# Patient Record
Sex: MALE | Race: WHITE | NOT HISPANIC OR LATINO | Employment: OTHER | URBAN - METROPOLITAN AREA
[De-identification: names, ages, dates, MRNs, and addresses within clinical notes are randomized per-mention and may not be internally consistent; named-entity substitution may affect disease eponyms.]

---

## 2017-01-24 ENCOUNTER — TRANSCRIBE ORDERS (OUTPATIENT)
Dept: ADMINISTRATIVE | Facility: HOSPITAL | Age: 82
End: 2017-01-24

## 2017-01-24 ENCOUNTER — HOSPITAL ENCOUNTER (OUTPATIENT)
Dept: GENERAL RADIOLOGY | Facility: HOSPITAL | Age: 82
Discharge: HOME | End: 2017-01-24
Admitting: UROLOGY

## 2017-01-24 DIAGNOSIS — N31.1 REFLEX NEUROPATHIC BLADDER, NOT ELSEWHERE CLASSIFIED: Primary | ICD-10-CM

## 2017-01-24 PROCEDURE — 74000 HC ABDOMEN KUB: CPT

## 2017-04-20 ENCOUNTER — TRANSCRIBE ORDERS (OUTPATIENT)
Dept: GENERAL RADIOLOGY | Facility: HOSPITAL | Age: 82
End: 2017-04-20

## 2017-04-20 ENCOUNTER — HOSPITAL ENCOUNTER (OUTPATIENT)
Dept: GENERAL RADIOLOGY | Facility: HOSPITAL | Age: 82
Discharge: HOME | End: 2017-04-20
Admitting: UROLOGY

## 2017-04-20 DIAGNOSIS — M40.209 KYPHOSIS, UNSPECIFIED KYPHOSIS TYPE, UNSPECIFIED SPINAL REGION: Primary | ICD-10-CM

## 2017-04-20 DIAGNOSIS — M40.209 KYPHOSIS, UNSPECIFIED KYPHOSIS TYPE, UNSPECIFIED SPINAL REGION: ICD-10-CM

## 2017-04-20 PROCEDURE — 74000 HC ABDOMEN KUB: CPT

## 2017-12-11 ENCOUNTER — HOSPITAL ENCOUNTER (OUTPATIENT)
Dept: GENERAL RADIOLOGY | Facility: HOSPITAL | Age: 82
Discharge: HOME | End: 2017-12-11
Admitting: UROLOGY

## 2017-12-11 ENCOUNTER — TRANSCRIBE ORDERS (OUTPATIENT)
Dept: GENERAL RADIOLOGY | Facility: HOSPITAL | Age: 82
End: 2017-12-11

## 2017-12-11 DIAGNOSIS — N40.1 URINARY HESITANCY DUE TO BENIGN PROSTATIC HYPERPLASIA: ICD-10-CM

## 2017-12-11 DIAGNOSIS — N40.1 URINARY HESITANCY DUE TO BENIGN PROSTATIC HYPERPLASIA: Primary | ICD-10-CM

## 2017-12-11 DIAGNOSIS — R39.11 URINARY HESITANCY DUE TO BENIGN PROSTATIC HYPERPLASIA: ICD-10-CM

## 2017-12-11 DIAGNOSIS — R39.11 URINARY HESITANCY DUE TO BENIGN PROSTATIC HYPERPLASIA: Primary | ICD-10-CM

## 2017-12-11 PROCEDURE — 74000 HC ABDOMEN KUB: CPT

## 2018-06-18 ENCOUNTER — APPOINTMENT (OUTPATIENT)
Dept: GENERAL RADIOLOGY | Facility: HOSPITAL | Age: 83
End: 2018-06-18

## 2018-06-18 ENCOUNTER — HOSPITAL ENCOUNTER (EMERGENCY)
Facility: HOSPITAL | Age: 83
Discharge: HOME | End: 2018-06-18
Attending: EMERGENCY MEDICINE | Admitting: EMERGENCY MEDICINE

## 2018-06-18 VITALS
SYSTOLIC BLOOD PRESSURE: 153 MMHG | BODY MASS INDEX: 29.21 KG/M2 | DIASTOLIC BLOOD PRESSURE: 81 MMHG | OXYGEN SATURATION: 95 % | TEMPERATURE: 98.2 F | RESPIRATION RATE: 16 BRPM | WEIGHT: 197.2 LBS | HEART RATE: 90 BPM | HEIGHT: 69 IN

## 2018-06-18 DIAGNOSIS — S62.101A CLOSED FRACTURE OF RIGHT WRIST, INITIAL ENCOUNTER: Primary | ICD-10-CM

## 2018-06-18 PROCEDURE — 99283 EMERGENCY DEPT VISIT LOW MDM: CPT

## 2018-06-18 PROCEDURE — 73110 X-RAY EXAM OF WRIST: CPT

## 2018-06-18 RX ORDER — HYDROCODONE BITARTRATE AND ACETAMINOPHEN 5; 325 MG/1; MG/1
1 TABLET ORAL ONCE
Status: COMPLETED | OUTPATIENT
Start: 2018-06-18 | End: 2018-06-18

## 2018-06-18 RX ORDER — HYDROCODONE BITARTRATE AND ACETAMINOPHEN 5; 325 MG/1; MG/1
1 TABLET ORAL EVERY 6 HOURS PRN
Qty: 12 TABLET | Refills: 0 | Status: SHIPPED | OUTPATIENT
Start: 2018-06-18 | End: 2018-06-22 | Stop reason: HOSPADM

## 2018-06-18 RX ADMIN — HYDROCODONE BITARTRATE AND ACETAMINOPHEN 1 TABLET: 5; 325 TABLET ORAL at 15:52

## 2018-06-18 NOTE — ED PROVIDER NOTES
Subjective   82-year-old male presents with a wrist injury after fall.  He states he was pulling down a pulled down ladder lost his balance and fell onto the garage floor.  He landed on his outstretched right wrist.  He has pain and swelling and obvious deformity to the right wrist.  No head or neck injury no loss of consciousness        History provided by:  Patient   used: No        Review of Systems   Musculoskeletal:        Right wrist injury   All other systems reviewed and are negative.      Past Medical History:   Diagnosis Date   • Acute myocardial infarction    • BPH (benign prostatic hyperplasia)    • CAD (coronary artery disease)    • GERD without esophagitis    • [place] (hard of hearing)    • Hyperlipidemia     UNSPECIFIED   • Hypertension    • Hypothyroidism        Allergies   Allergen Reactions   • Metoprolol        Past Surgical History:   Procedure Laterality Date   • CORONARY ARTERY BYPASS GRAFT     • OTHER SURGICAL HISTORY      INGUINAL HERNIA REPAIR BILATERAL   • OTHER SURGICAL HISTORY      PREVIOUS STENT PLACEMENT       Family History   Problem Relation Age of Onset   • Coronary artery disease Mother    • Coronary artery disease Sister        Social History     Social History   • Marital status:      Social History Main Topics   • Smoking status: Former Smoker   • Alcohol use No   • Drug use: Unknown     Other Topics Concern   • Not on file           Objective   Physical Exam   Constitutional: He is oriented to person, place, and time. He appears well-developed and well-nourished.   HENT:   Head: Normocephalic and atraumatic.   Eyes: EOM are normal. Pupils are equal, round, and reactive to light.   Neck: Normal range of motion.   Cardiovascular: Normal rate and regular rhythm.    Pulmonary/Chest: Effort normal and breath sounds normal.   Abdominal: Soft. Bowel sounds are normal.   Musculoskeletal: He exhibits edema, tenderness and deformity.   Right wrist deformity, NV  intact, good pulses    Neurological: He is alert and oriented to person, place, and time.   Skin: Skin is warm and dry.   Psychiatric: He has a normal mood and affect. His behavior is normal. Judgment and thought content normal.   Nursing note and vitals reviewed.      Splint - Cast - Strapping  Date/Time: 6/18/2018 3:29 PM  Performed by: SHANIA GAINES JR  Authorized by: KWABENA AL     Consent:     Consent obtained:  Verbal    Consent given by:  Patient    Risks discussed:  Numbness and pain    Alternatives discussed:  No treatment  Pre-procedure details:     Sensation:  Normal    Skin color:  Normal  Procedure details:     Laterality:  Right    Location:  Wrist    Wrist:  R wrist    Strapping: no      Cast type:  Long arm    Splint type:  Sugar tong    Supplies:  Cotton padding and Ortho-Glass  Post-procedure details:     Pain:  Improved    Sensation:  Normal    Skin color:  Normal    Patient tolerance of procedure:  Tolerated well, no immediate complications               ED Course                  MDM      Final diagnoses:   Closed fracture of right wrist, initial encounter            Shania Gaines Jr., PA-C  06/18/18 1534

## 2018-06-19 ENCOUNTER — HOSPITAL ENCOUNTER (OUTPATIENT)
Dept: GENERAL RADIOLOGY | Facility: HOSPITAL | Age: 83
Discharge: HOME | End: 2018-06-19
Admitting: ORTHOPAEDIC SURGERY

## 2018-06-19 ENCOUNTER — OFFICE VISIT (OUTPATIENT)
Dept: ORTHOPEDIC SURGERY | Facility: CLINIC | Age: 83
End: 2018-06-19

## 2018-06-19 ENCOUNTER — APPOINTMENT (OUTPATIENT)
Dept: PREADMISSION TESTING | Facility: HOSPITAL | Age: 83
End: 2018-06-19

## 2018-06-19 ENCOUNTER — PREP FOR SURGERY (OUTPATIENT)
Dept: OTHER | Facility: HOSPITAL | Age: 83
End: 2018-06-19

## 2018-06-19 VITALS
BODY MASS INDEX: 28.14 KG/M2 | WEIGHT: 190 LBS | SYSTOLIC BLOOD PRESSURE: 156 MMHG | DIASTOLIC BLOOD PRESSURE: 81 MMHG | HEIGHT: 69 IN | HEART RATE: 75 BPM | OXYGEN SATURATION: 97 %

## 2018-06-19 VITALS — HEIGHT: 69 IN | WEIGHT: 196 LBS | BODY MASS INDEX: 29.03 KG/M2 | RESPIRATION RATE: 18 BRPM

## 2018-06-19 DIAGNOSIS — Z01.818 PREOP EXAMINATION: ICD-10-CM

## 2018-06-19 DIAGNOSIS — Z01.818 PREOP EXAMINATION: Primary | ICD-10-CM

## 2018-06-19 DIAGNOSIS — S52.531A CLOSED COLLES' FRACTURE OF RIGHT RADIUS, INITIAL ENCOUNTER: Primary | ICD-10-CM

## 2018-06-19 LAB
ALBUMIN SERPL-MCNC: 4.4 G/DL (ref 3.5–5)
ALBUMIN/GLOB SERPL: 1.2 G/DL (ref 1–2)
ALP SERPL-CCNC: 68 U/L (ref 38–126)
ALT SERPL W P-5'-P-CCNC: 28 U/L (ref 13–69)
ANION GAP SERPL CALCULATED.3IONS-SCNC: 15.1 MMOL/L (ref 10–20)
AST SERPL-CCNC: 29 U/L (ref 15–46)
BACTERIA UR QL AUTO: ABNORMAL /HPF
BASOPHILS # BLD AUTO: 0.07 10*3/MM3 (ref 0–0.2)
BASOPHILS NFR BLD AUTO: 0.6 % (ref 0–2.5)
BILIRUB SERPL-MCNC: 1 MG/DL (ref 0.2–1.3)
BILIRUB UR QL STRIP: NEGATIVE
BUN BLD-MCNC: 13 MG/DL (ref 7–20)
BUN/CREAT SERPL: 13 (ref 6.3–21.9)
CALCIUM SPEC-SCNC: 8.6 MG/DL (ref 8.4–10.2)
CHLORIDE SERPL-SCNC: 101 MMOL/L (ref 98–107)
CLARITY UR: CLEAR
CO2 SERPL-SCNC: 25 MMOL/L (ref 26–30)
COLOR UR: YELLOW
CREAT BLD-MCNC: 1 MG/DL (ref 0.6–1.3)
DEPRECATED RDW RBC AUTO: 41 FL (ref 37–54)
EOSINOPHIL # BLD AUTO: 0.21 10*3/MM3 (ref 0–0.7)
EOSINOPHIL NFR BLD AUTO: 1.9 % (ref 0–7)
ERYTHROCYTE [DISTWIDTH] IN BLOOD BY AUTOMATED COUNT: 11.8 % (ref 11.5–14.5)
GFR SERPL CREATININE-BSD FRML MDRD: 72 ML/MIN/1.73
GLOBULIN UR ELPH-MCNC: 3.7 GM/DL
GLUCOSE BLD-MCNC: 97 MG/DL (ref 74–98)
GLUCOSE UR STRIP-MCNC: NEGATIVE MG/DL
HCT VFR BLD AUTO: 46.5 % (ref 42–52)
HGB BLD-MCNC: 15.6 G/DL (ref 14–18)
HGB UR QL STRIP.AUTO: ABNORMAL
HYALINE CASTS UR QL AUTO: ABNORMAL /LPF
IMM GRANULOCYTES # BLD: 0.12 10*3/MM3 (ref 0–0.06)
IMM GRANULOCYTES NFR BLD: 1.1 % (ref 0–0.6)
KETONES UR QL STRIP: NEGATIVE
LEUKOCYTE ESTERASE UR QL STRIP.AUTO: NEGATIVE
LYMPHOCYTES # BLD AUTO: 2.91 10*3/MM3 (ref 0.6–3.4)
LYMPHOCYTES NFR BLD AUTO: 26.7 % (ref 10–50)
MCH RBC QN AUTO: 32.2 PG (ref 27–31)
MCHC RBC AUTO-ENTMCNC: 33.5 G/DL (ref 30–37)
MCV RBC AUTO: 95.9 FL (ref 80–94)
MONOCYTES # BLD AUTO: 1.13 10*3/MM3 (ref 0–0.9)
MONOCYTES NFR BLD AUTO: 10.4 % (ref 0–12)
NEUTROPHILS # BLD AUTO: 6.47 10*3/MM3 (ref 2–6.9)
NEUTROPHILS NFR BLD AUTO: 59.3 % (ref 37–80)
NITRITE UR QL STRIP: NEGATIVE
NRBC BLD MANUAL-RTO: 0 /100 WBC (ref 0–0)
PH UR STRIP.AUTO: 6.5 [PH] (ref 5–8)
PLATELET # BLD AUTO: 178 10*3/MM3 (ref 130–400)
PMV BLD AUTO: 10.1 FL (ref 6–12)
POTASSIUM BLD-SCNC: 4.1 MMOL/L (ref 3.5–5.1)
PROT SERPL-MCNC: 8.1 G/DL (ref 6.3–8.2)
PROT UR QL STRIP: NEGATIVE
RBC # BLD AUTO: 4.85 10*6/MM3 (ref 4.7–6.1)
RBC # UR: ABNORMAL /HPF
REF LAB TEST METHOD: ABNORMAL
SODIUM BLD-SCNC: 137 MMOL/L (ref 137–145)
SP GR UR STRIP: 1.02 (ref 1–1.03)
SQUAMOUS #/AREA URNS HPF: ABNORMAL /HPF
UROBILINOGEN UR QL STRIP: ABNORMAL
WBC NRBC COR # BLD: 10.91 10*3/MM3 (ref 4.8–10.8)
WBC UR QL AUTO: ABNORMAL /HPF

## 2018-06-19 PROCEDURE — 71046 X-RAY EXAM CHEST 2 VIEWS: CPT

## 2018-06-19 PROCEDURE — 80053 COMPREHEN METABOLIC PANEL: CPT | Performed by: ORTHOPAEDIC SURGERY

## 2018-06-19 PROCEDURE — 85025 COMPLETE CBC W/AUTO DIFF WBC: CPT | Performed by: ORTHOPAEDIC SURGERY

## 2018-06-19 PROCEDURE — 81001 URINALYSIS AUTO W/SCOPE: CPT | Performed by: ORTHOPAEDIC SURGERY

## 2018-06-19 PROCEDURE — 36415 COLL VENOUS BLD VENIPUNCTURE: CPT

## 2018-06-19 PROCEDURE — 87081 CULTURE SCREEN ONLY: CPT | Performed by: ORTHOPAEDIC SURGERY

## 2018-06-19 PROCEDURE — 99204 OFFICE O/P NEW MOD 45 MIN: CPT | Performed by: ORTHOPAEDIC SURGERY

## 2018-06-19 PROCEDURE — 93005 ELECTROCARDIOGRAM TRACING: CPT | Performed by: ORTHOPAEDIC SURGERY

## 2018-06-19 RX ORDER — FLUTICASONE PROPIONATE 50 MCG
1 SPRAY, SUSPENSION (ML) NASAL DAILY
Status: ON HOLD | COMMUNITY
End: 2019-11-25

## 2018-06-19 NOTE — PROGRESS NOTES
Subjective   Patient ID: Remington Rojas is a 82 y.o. male  Pain of the Right Wrist (Patient states he fell at home on 06-19-18 in the garage while trying to pull the stairs down from the attic when the rope broke and he lost balance. He says the pain comes and goes right now at a level of 5 or 6.)             History of Present Illness  Right-hand-dominant male lost his balance fell on his right side sustaining a closed fracture distal radius was splinted in ER yesterday comes in today to discuss treatment.    Has been in stable medical health underwent cardiac bypass surgery years ago and has had routine follow-up visits with no complications, denies recent cardiac symptoms, lives with his wife and is active with fishing and desires to return to full use of his right hand after this fracture is healed.    Denies head injury loss of consciousness did fall on the left side of his posterior buttock area but denies any real acute left groin pain posterior buttock pain numbness or tingling left leg.      Review of Systems   Constitutional: Negative for fever.   HENT: Negative for voice change.    Eyes: Negative for visual disturbance.   Respiratory: Negative for shortness of breath.    Cardiovascular: Negative for chest pain.   Gastrointestinal: Negative for abdominal distention and abdominal pain.   Genitourinary: Negative for dysuria.   Musculoskeletal: Positive for arthralgias. Negative for gait problem and joint swelling.   Skin: Negative for rash.   Neurological: Negative for speech difficulty.   Hematological: Does not bruise/bleed easily.   Psychiatric/Behavioral: Negative for confusion.       Past Medical History:   Diagnosis Date   • Acute myocardial infarction    • BPH (benign prostatic hyperplasia)    • CAD (coronary artery disease)    • GERD without esophagitis    • [place] (hard of hearing)    • Hyperlipidemia     UNSPECIFIED   • Hypertension    • Hypothyroidism         Past Surgical History:   Procedure  Laterality Date   • CORONARY ARTERY BYPASS GRAFT     • OTHER SURGICAL HISTORY      INGUINAL HERNIA REPAIR BILATERAL   • OTHER SURGICAL HISTORY      PREVIOUS STENT PLACEMENT       Family History   Problem Relation Age of Onset   • Coronary artery disease Mother    • Coronary artery disease Sister        Social History     Social History   • Marital status:      Spouse name: N/A   • Number of children: N/A   • Years of education: N/A     Occupational History   • Not on file.     Social History Main Topics   • Smoking status: Former Smoker   • Smokeless tobacco: Never Used   • Alcohol use No   • Drug use: No   • Sexual activity: Defer     Other Topics Concern   • Not on file     Social History Narrative   • No narrative on file       I have reviewed all of the above social hx, family hx, surgical hx, medications, allergies & ROS and confirm that it is accurate.    Allergies   Allergen Reactions   • Metoprolol          Current Outpatient Prescriptions:   •  aspirin 81 MG tablet, Take  by mouth., Disp: , Rfl:   •  famotidine (PEPCID) 20 MG tablet, Take 20 mg by mouth 2 (two) times a day., Disp: , Rfl:   •  finasteride (PROSCAR) 5 MG tablet, Take 5 mg by mouth daily., Disp: , Rfl:   •  HYDROcodone-acetaminophen (NORCO) 5-325 MG per tablet, Take 1 tablet by mouth Every 6 (Six) Hours As Needed for Mild Pain ., Disp: 12 tablet, Rfl: 0  •  HYDROcodone-acetaminophen (NORCO) 7.5-325 MG per tablet, Take  by mouth., Disp: , Rfl:   •  levothyroxine (SYNTHROID, LEVOTHROID) 25 MCG tablet, Take 25 mcg by mouth daily., Disp: , Rfl:   •  lisinopril (PRINIVIL,ZESTRIL) 2.5 MG tablet, Take  by mouth., Disp: , Rfl:   •  nitroglycerin (NITROSTAT) 0.4 MG SL tablet, Place  under the tongue., Disp: , Rfl:   •  Omega-3 Fatty Acids (FISH OIL PO), Take  by mouth., Disp: , Rfl:   •  tamsulosin (FLOMAX) 0.4 MG capsule 24 hr capsule, Take 1 capsule by mouth every night., Disp: , Rfl:   No current facility-administered medications for this  "visit.     Objective   Resp 18   Ht 175.3 cm (69\")   Wt 88.9 kg (196 lb)   BMI 28.94 kg/m²    Physical Exam   Cardiovascular: Regular rhythm and normal heart sounds.    Pulmonary/Chest: Effort normal and breath sounds normal.   Musculoskeletal:        Right wrist: He exhibits tenderness, bony tenderness and swelling.     Constitutional: Patient is oriented to person, place, and time. Patient appears well-developed and well-nourished.   HENT:Head: Normocephalic and atraumatic.   Eyes: EOM are normal. Pupils are equal, round, and reactive to light.   Neck: Normal range of motion. Neck supple.   Cardiovascular: Normal rate.    Pulmonary/Chest: Effort normal and breath sounds normal.   Abdominal: Soft.   Neurological: Patient is alert and oriented to person, place, and time.   Skin: Skin is warm and dry.   Psychiatric: Patient has a normal mood and affect.   Nursing note and vitals reviewed.       Ortho Exam   Gauntlet type splint to the right arm was removed soft padding was cut good capillary refill the fingers no open wounds tenderness swelling ecchymosis on the radial side of the wrist.    Assessment/Plan   Review of Radiographic Studies:    No new imaging done today.      Procedures        Orthopedic activities reviewed and patient expressed appreciation, Risk, benefits, and merits of treatment alternatives reviewed with the patient and questions answered and The nature of the proposed surgery reviewed with the patient including risks, benefits, rehabilitation, recovery timeframe, and outcome expectations      Recommendations/Plan:   Surgery: Surgery proposed at this visit as noted.    Patient agreeable to call or return sooner for any concerns.         I discussed with the patient the diagnosis, condition, natural history and treatment alternatives, both surgical and nonsurgical.  I reviewed the surgical procedural details using models, diagrams and reviewing x-ray findings.  I explained the nature of the " operation, anesthesia methods and the postoperative recovery.  I explained risks and complications including but not limited to infection, bleeding, blood clotting, poor healing, chronic pain, stiffness, failure of the procedure, possible recurrence of the condition and anesthetic related risks.  The patient had opportunity to ask questions which were all answered to their satisfaction and decided to proceed with the plan for surgery.  I believe informed consent to proceed with the surgery was given verbally in my presence today.  The surgical consent form will be signed in the presence of the nursing staff prior to the surgery.    Impression:  50° dorsally angulated comminuted and shortened distal radius collies type fracture with deformity neurovascularly intact, history of cardiac bypass stable  Plan:  Right distal radius ORIF with volar plating  Patient got very nauseous taking hydrocodone so consider using tramadol and/or oxycodone with Zofran for postoperative pain control

## 2018-06-19 NOTE — PAT
PATIENT GAVE UNCLEAR CARDIAC HISTORY TO RN REGARDING TIMELINE OF SURGERY AND EVENTS.  NOTED LAST CARDIAC INFORMATION ON FILE FROM .  PATIENT DID REPORT NO CHEST PAIN OR KNOWN CARDIAC ISSUES SINCE MI AND OPEN HEART SURGERY.  EKG OBTAINED.  PRELIMINARY READING AND HEALTH HISTORY INFORMATION PHONED TO STACEY LOPEZ CRNA.  AFTER DISCUSSION, CRNA REQUESTED THAT MR SARAVIA HAVE CARDIAC CLEARANCE BEFORE PROCEEDING WITH PROPOSED PROCEDURE SCHEDULED FOR 06-22-18.  OFFICE CLOSED AT TIME REQUEST RECEIVED.  RN WILL PHONE TOI AT OFFICE 06-20-18 WITH REQUEST.

## 2018-06-19 NOTE — DISCHARGE INSTRUCTIONS

## 2018-06-22 ENCOUNTER — HOSPITAL ENCOUNTER (OUTPATIENT)
Facility: HOSPITAL | Age: 83
Setting detail: HOSPITAL OUTPATIENT SURGERY
Discharge: HOME | End: 2018-06-22
Attending: ORTHOPAEDIC SURGERY | Admitting: ORTHOPAEDIC SURGERY

## 2018-06-22 ENCOUNTER — ANESTHESIA (OUTPATIENT)
Dept: PERIOP | Facility: HOSPITAL | Age: 83
End: 2018-06-22

## 2018-06-22 ENCOUNTER — ANESTHESIA EVENT (OUTPATIENT)
Dept: PERIOP | Facility: HOSPITAL | Age: 83
End: 2018-06-22

## 2018-06-22 ENCOUNTER — APPOINTMENT (OUTPATIENT)
Dept: GENERAL RADIOLOGY | Facility: HOSPITAL | Age: 83
End: 2018-06-22

## 2018-06-22 VITALS
TEMPERATURE: 98.9 F | RESPIRATION RATE: 20 BRPM | WEIGHT: 190 LBS | BODY MASS INDEX: 28.14 KG/M2 | SYSTOLIC BLOOD PRESSURE: 114 MMHG | DIASTOLIC BLOOD PRESSURE: 68 MMHG | HEART RATE: 73 BPM | HEIGHT: 69 IN | OXYGEN SATURATION: 96 %

## 2018-06-22 DIAGNOSIS — Z01.818 PREOP EXAMINATION: ICD-10-CM

## 2018-06-22 LAB — MRSA SPEC QL CULT: NORMAL

## 2018-06-22 PROCEDURE — 76000 FLUOROSCOPY <1 HR PHYS/QHP: CPT

## 2018-06-22 PROCEDURE — 25010000002 MIDAZOLAM PER 1 MG: Performed by: NURSE ANESTHETIST, CERTIFIED REGISTERED

## 2018-06-22 PROCEDURE — 25010000002 PROPOFOL 200 MG/20ML EMULSION: Performed by: NURSE ANESTHETIST, CERTIFIED REGISTERED

## 2018-06-22 PROCEDURE — 25010000002 FENTANYL CITRATE (PF) 100 MCG/2ML SOLUTION: Performed by: NURSE ANESTHETIST, CERTIFIED REGISTERED

## 2018-06-22 PROCEDURE — 25609 OPTX DST RD XART FX/EP SEP3+: CPT | Performed by: ORTHOPAEDIC SURGERY

## 2018-06-22 PROCEDURE — 25010000002 DEXAMETHASONE SODIUM PHOSPHATE 10 MG/ML SOLUTION: Performed by: NURSE ANESTHETIST, CERTIFIED REGISTERED

## 2018-06-22 PROCEDURE — C1713 ANCHOR/SCREW BN/BN,TIS/BN: HCPCS | Performed by: ORTHOPAEDIC SURGERY

## 2018-06-22 PROCEDURE — 25010000002 ONDANSETRON PER 1 MG: Performed by: ORTHOPAEDIC SURGERY

## 2018-06-22 PROCEDURE — C1769 GUIDE WIRE: HCPCS | Performed by: ORTHOPAEDIC SURGERY

## 2018-06-22 PROCEDURE — 25010000003 CEFAZOLIN PER 500 MG: Performed by: ORTHOPAEDIC SURGERY

## 2018-06-22 PROCEDURE — 25010000002 PROPOFOL 1000 MG/ML EMULSION: Performed by: NURSE ANESTHETIST, CERTIFIED REGISTERED

## 2018-06-22 DEVICE — SCRW LK VA W STRDRV 2.4X20MM: Type: IMPLANTABLE DEVICE | Site: WRIST | Status: FUNCTIONAL

## 2018-06-22 DEVICE — SCRW LK VA W STRDRV 2.4X24MM: Type: IMPLANTABLE DEVICE | Site: WRIST | Status: FUNCTIONAL

## 2018-06-22 DEVICE — PLT VOLR LCPVA 2CLMN D/R6H HD3H SS2.4RT: Type: IMPLANTABLE DEVICE | Site: WRIST | Status: FUNCTIONAL

## 2018-06-22 DEVICE — SCRW LK VA W STRDRV 2.4X16MM: Type: IMPLANTABLE DEVICE | Site: WRIST | Status: FUNCTIONAL

## 2018-06-22 DEVICE — SCRW CORT S/TAP STRDRV 2.7X14MM: Type: IMPLANTABLE DEVICE | Site: WRIST | Status: FUNCTIONAL

## 2018-06-22 RX ORDER — ONDANSETRON 2 MG/ML
4 INJECTION INTRAMUSCULAR; INTRAVENOUS ONCE AS NEEDED
Status: COMPLETED | OUTPATIENT
Start: 2018-06-22 | End: 2018-06-22

## 2018-06-22 RX ORDER — FENTANYL CITRATE 50 UG/ML
INJECTION, SOLUTION INTRAMUSCULAR; INTRAVENOUS AS NEEDED
Status: DISCONTINUED | OUTPATIENT
Start: 2018-06-22 | End: 2018-06-22 | Stop reason: SURG

## 2018-06-22 RX ORDER — OXYCODONE AND ACETAMINOPHEN 7.5; 325 MG/1; MG/1
1 TABLET ORAL EVERY 4 HOURS PRN
Status: CANCELLED | OUTPATIENT
Start: 2018-06-22 | End: 2018-07-02

## 2018-06-22 RX ORDER — DEXAMETHASONE SODIUM PHOSPHATE 10 MG/ML
INJECTION, SOLUTION INTRAMUSCULAR; INTRAVENOUS AS NEEDED
Status: DISCONTINUED | OUTPATIENT
Start: 2018-06-22 | End: 2018-06-22 | Stop reason: SURG

## 2018-06-22 RX ORDER — MIDAZOLAM HYDROCHLORIDE 1 MG/ML
INJECTION INTRAMUSCULAR; INTRAVENOUS AS NEEDED
Status: DISCONTINUED | OUTPATIENT
Start: 2018-06-22 | End: 2018-06-22 | Stop reason: SURG

## 2018-06-22 RX ORDER — MIDAZOLAM HYDROCHLORIDE 1 MG/ML
INJECTION INTRAMUSCULAR; INTRAVENOUS
Status: COMPLETED
Start: 2018-06-22 | End: 2018-06-22

## 2018-06-22 RX ORDER — SODIUM CHLORIDE 0.9 % (FLUSH) 0.9 %
3 SYRINGE (ML) INJECTION AS NEEDED
Status: DISCONTINUED | OUTPATIENT
Start: 2018-06-22 | End: 2018-06-22 | Stop reason: HOSPADM

## 2018-06-22 RX ORDER — MAGNESIUM HYDROXIDE 1200 MG/15ML
LIQUID ORAL AS NEEDED
Status: DISCONTINUED | OUTPATIENT
Start: 2018-06-22 | End: 2018-06-22 | Stop reason: HOSPADM

## 2018-06-22 RX ORDER — DEXAMETHASONE SODIUM PHOSPHATE 10 MG/ML
INJECTION, SOLUTION INTRAMUSCULAR; INTRAVENOUS
Status: COMPLETED
Start: 2018-06-22 | End: 2018-06-22

## 2018-06-22 RX ORDER — BUPIVACAINE HYDROCHLORIDE 5 MG/ML
INJECTION, SOLUTION EPIDURAL; INTRACAUDAL
Status: COMPLETED
Start: 2018-06-22 | End: 2018-06-22

## 2018-06-22 RX ORDER — KETAMINE HYDROCHLORIDE 50 MG/ML
INJECTION, SOLUTION, CONCENTRATE INTRAMUSCULAR; INTRAVENOUS AS NEEDED
Status: DISCONTINUED | OUTPATIENT
Start: 2018-06-22 | End: 2018-06-22 | Stop reason: SURG

## 2018-06-22 RX ORDER — FENTANYL CITRATE 50 UG/ML
INJECTION, SOLUTION INTRAMUSCULAR; INTRAVENOUS
Status: COMPLETED
Start: 2018-06-22 | End: 2018-06-22

## 2018-06-22 RX ORDER — PROPOFOL 10 MG/ML
INJECTION, EMULSION INTRAVENOUS AS NEEDED
Status: DISCONTINUED | OUTPATIENT
Start: 2018-06-22 | End: 2018-06-22 | Stop reason: SURG

## 2018-06-22 RX ORDER — BUPIVACAINE HYDROCHLORIDE 5 MG/ML
INJECTION, SOLUTION EPIDURAL; INTRACAUDAL AS NEEDED
Status: DISCONTINUED | OUTPATIENT
Start: 2018-06-22 | End: 2018-06-22 | Stop reason: SURG

## 2018-06-22 RX ORDER — LIDOCAINE HYDROCHLORIDE 10 MG/ML
INJECTION, SOLUTION INFILTRATION; PERINEURAL
Status: DISCONTINUED
Start: 2018-06-22 | End: 2018-06-22 | Stop reason: HOSPADM

## 2018-06-22 RX ORDER — HYDROCODONE BITARTRATE AND ACETAMINOPHEN 5; 325 MG/1; MG/1
1-2 TABLET ORAL EVERY 6 HOURS PRN
Qty: 30 TABLET | Refills: 0 | Status: ON HOLD | OUTPATIENT
Start: 2018-06-22 | End: 2019-11-25

## 2018-06-22 RX ORDER — SODIUM CHLORIDE, SODIUM LACTATE, POTASSIUM CHLORIDE, CALCIUM CHLORIDE 600; 310; 30; 20 MG/100ML; MG/100ML; MG/100ML; MG/100ML
1000 INJECTION, SOLUTION INTRAVENOUS CONTINUOUS
Status: DISCONTINUED | OUTPATIENT
Start: 2018-06-22 | End: 2018-06-22 | Stop reason: HOSPADM

## 2018-06-22 RX ORDER — ONDANSETRON 4 MG/1
4 TABLET, FILM COATED ORAL ONCE AS NEEDED
Status: DISCONTINUED | OUTPATIENT
Start: 2018-06-22 | End: 2018-06-22 | Stop reason: HOSPADM

## 2018-06-22 RX ADMIN — ONDANSETRON 4 MG: 2 INJECTION INTRAMUSCULAR; INTRAVENOUS at 13:53

## 2018-06-22 RX ADMIN — BUPIVACAINE HYDROCHLORIDE 20 ML: 5 INJECTION, SOLUTION EPIDURAL; INTRACAUDAL; PERINEURAL at 12:20

## 2018-06-22 RX ADMIN — DEXAMETHASONE SODIUM PHOSPHATE 5 MG: 10 INJECTION, SOLUTION INTRAMUSCULAR; INTRAVENOUS at 12:20

## 2018-06-22 RX ADMIN — BUPIVACAINE HYDROCHLORIDE 8 ML: 5 INJECTION, SOLUTION EPIDURAL; INTRACAUDAL; PERINEURAL at 13:58

## 2018-06-22 RX ADMIN — PROPOFOL 75 MCG/KG/MIN: 10 INJECTION, EMULSION INTRAVENOUS at 12:53

## 2018-06-22 RX ADMIN — LIDOCAINE HYDROCHLORIDE 40 MG: 20 INJECTION, SOLUTION INTRAVENOUS at 12:45

## 2018-06-22 RX ADMIN — SODIUM CHLORIDE, POTASSIUM CHLORIDE, SODIUM LACTATE AND CALCIUM CHLORIDE: 600; 310; 30; 20 INJECTION, SOLUTION INTRAVENOUS at 12:45

## 2018-06-22 RX ADMIN — MIDAZOLAM HYDROCHLORIDE 1 MG: 1 INJECTION, SOLUTION INTRAMUSCULAR; INTRAVENOUS at 12:15

## 2018-06-22 RX ADMIN — FENTANYL CITRATE 50 MCG: 50 INJECTION, SOLUTION INTRAMUSCULAR; INTRAVENOUS at 12:15

## 2018-06-22 RX ADMIN — FENTANYL CITRATE 50 MCG: 50 INJECTION, SOLUTION INTRAMUSCULAR; INTRAVENOUS at 12:46

## 2018-06-22 RX ADMIN — KETAMINE HYDROCHLORIDE 25 MG: 50 INJECTION, SOLUTION INTRAMUSCULAR; INTRAVENOUS at 13:50

## 2018-06-22 RX ADMIN — CEFAZOLIN SODIUM 2 G: 2 SOLUTION INTRAVENOUS at 12:50

## 2018-06-22 RX ADMIN — PROPOFOL 40 MG: 10 INJECTION, EMULSION INTRAVENOUS at 12:50

## 2018-06-22 RX ADMIN — MIDAZOLAM HYDROCHLORIDE 1 MG: 1 INJECTION, SOLUTION INTRAMUSCULAR; INTRAVENOUS at 12:46

## 2018-06-22 RX ADMIN — SODIUM CHLORIDE, POTASSIUM CHLORIDE, SODIUM LACTATE AND CALCIUM CHLORIDE 1000 ML: 600; 310; 30; 20 INJECTION, SOLUTION INTRAVENOUS at 08:50

## 2018-06-22 NOTE — H&P (VIEW-ONLY)
Subjective   Patient ID: Remington Rojas is a 82 y.o. male  Pain of the Right Wrist (Patient states he fell at home on 06-19-18 in the garage while trying to pull the stairs down from the attic when the rope broke and he lost balance. He says the pain comes and goes right now at a level of 5 or 6.)             History of Present Illness  Right-hand-dominant male lost his balance fell on his right side sustaining a closed fracture distal radius was splinted in ER yesterday comes in today to discuss treatment.    Has been in stable medical health underwent cardiac bypass surgery years ago and has had routine follow-up visits with no complications, denies recent cardiac symptoms, lives with his wife and is active with fishing and desires to return to full use of his right hand after this fracture is healed.    Denies head injury loss of consciousness did fall on the left side of his posterior buttock area but denies any real acute left groin pain posterior buttock pain numbness or tingling left leg.      Review of Systems   Constitutional: Negative for fever.   HENT: Negative for voice change.    Eyes: Negative for visual disturbance.   Respiratory: Negative for shortness of breath.    Cardiovascular: Negative for chest pain.   Gastrointestinal: Negative for abdominal distention and abdominal pain.   Genitourinary: Negative for dysuria.   Musculoskeletal: Positive for arthralgias. Negative for gait problem and joint swelling.   Skin: Negative for rash.   Neurological: Negative for speech difficulty.   Hematological: Does not bruise/bleed easily.   Psychiatric/Behavioral: Negative for confusion.       Past Medical History:   Diagnosis Date   • Acute myocardial infarction    • BPH (benign prostatic hyperplasia)    • CAD (coronary artery disease)    • GERD without esophagitis    • [place] (hard of hearing)    • Hyperlipidemia     UNSPECIFIED   • Hypertension    • Hypothyroidism         Past Surgical History:   Procedure  Laterality Date   • CORONARY ARTERY BYPASS GRAFT     • OTHER SURGICAL HISTORY      INGUINAL HERNIA REPAIR BILATERAL   • OTHER SURGICAL HISTORY      PREVIOUS STENT PLACEMENT       Family History   Problem Relation Age of Onset   • Coronary artery disease Mother    • Coronary artery disease Sister        Social History     Social History   • Marital status:      Spouse name: N/A   • Number of children: N/A   • Years of education: N/A     Occupational History   • Not on file.     Social History Main Topics   • Smoking status: Former Smoker   • Smokeless tobacco: Never Used   • Alcohol use No   • Drug use: No   • Sexual activity: Defer     Other Topics Concern   • Not on file     Social History Narrative   • No narrative on file       I have reviewed all of the above social hx, family hx, surgical hx, medications, allergies & ROS and confirm that it is accurate.    Allergies   Allergen Reactions   • Metoprolol          Current Outpatient Prescriptions:   •  aspirin 81 MG tablet, Take  by mouth., Disp: , Rfl:   •  famotidine (PEPCID) 20 MG tablet, Take 20 mg by mouth 2 (two) times a day., Disp: , Rfl:   •  finasteride (PROSCAR) 5 MG tablet, Take 5 mg by mouth daily., Disp: , Rfl:   •  HYDROcodone-acetaminophen (NORCO) 5-325 MG per tablet, Take 1 tablet by mouth Every 6 (Six) Hours As Needed for Mild Pain ., Disp: 12 tablet, Rfl: 0  •  HYDROcodone-acetaminophen (NORCO) 7.5-325 MG per tablet, Take  by mouth., Disp: , Rfl:   •  levothyroxine (SYNTHROID, LEVOTHROID) 25 MCG tablet, Take 25 mcg by mouth daily., Disp: , Rfl:   •  lisinopril (PRINIVIL,ZESTRIL) 2.5 MG tablet, Take  by mouth., Disp: , Rfl:   •  nitroglycerin (NITROSTAT) 0.4 MG SL tablet, Place  under the tongue., Disp: , Rfl:   •  Omega-3 Fatty Acids (FISH OIL PO), Take  by mouth., Disp: , Rfl:   •  tamsulosin (FLOMAX) 0.4 MG capsule 24 hr capsule, Take 1 capsule by mouth every night., Disp: , Rfl:   No current facility-administered medications for this  "visit.     Objective   Resp 18   Ht 175.3 cm (69\")   Wt 88.9 kg (196 lb)   BMI 28.94 kg/m²    Physical Exam   Cardiovascular: Regular rhythm and normal heart sounds.    Pulmonary/Chest: Effort normal and breath sounds normal.   Musculoskeletal:        Right wrist: He exhibits tenderness, bony tenderness and swelling.     Constitutional: Patient is oriented to person, place, and time. Patient appears well-developed and well-nourished.   HENT:Head: Normocephalic and atraumatic.   Eyes: EOM are normal. Pupils are equal, round, and reactive to light.   Neck: Normal range of motion. Neck supple.   Cardiovascular: Normal rate.    Pulmonary/Chest: Effort normal and breath sounds normal.   Abdominal: Soft.   Neurological: Patient is alert and oriented to person, place, and time.   Skin: Skin is warm and dry.   Psychiatric: Patient has a normal mood and affect.   Nursing note and vitals reviewed.       Ortho Exam   Gauntlet type splint to the right arm was removed soft padding was cut good capillary refill the fingers no open wounds tenderness swelling ecchymosis on the radial side of the wrist.    Assessment/Plan   Review of Radiographic Studies:    No new imaging done today.      Procedures        Orthopedic activities reviewed and patient expressed appreciation, Risk, benefits, and merits of treatment alternatives reviewed with the patient and questions answered and The nature of the proposed surgery reviewed with the patient including risks, benefits, rehabilitation, recovery timeframe, and outcome expectations      Recommendations/Plan:   Surgery: Surgery proposed at this visit as noted.    Patient agreeable to call or return sooner for any concerns.         I discussed with the patient the diagnosis, condition, natural history and treatment alternatives, both surgical and nonsurgical.  I reviewed the surgical procedural details using models, diagrams and reviewing x-ray findings.  I explained the nature of the " operation, anesthesia methods and the postoperative recovery.  I explained risks and complications including but not limited to infection, bleeding, blood clotting, poor healing, chronic pain, stiffness, failure of the procedure, possible recurrence of the condition and anesthetic related risks.  The patient had opportunity to ask questions which were all answered to their satisfaction and decided to proceed with the plan for surgery.  I believe informed consent to proceed with the surgery was given verbally in my presence today.  The surgical consent form will be signed in the presence of the nursing staff prior to the surgery.    Impression:  50° dorsally angulated comminuted and shortened distal radius collies type fracture with deformity neurovascularly intact, history of cardiac bypass stable  Plan:  Right distal radius ORIF with volar plating  Patient got very nauseous taking hydrocodone so consider using tramadol and/or oxycodone with Zofran for postoperative pain control

## 2018-06-22 NOTE — ANESTHESIA PREPROCEDURE EVALUATION
Anesthesia Evaluation     Patient summary reviewed and Nursing notes reviewed   NPO Solid Status: > 8 hours  NPO Liquid Status: > 8 hours           Airway   Mallampati: II  TM distance: >3 FB  Neck ROM: full  No difficulty expected  Dental - normal exam   (+) edentulous    Pulmonary - normal exam   Cardiovascular - normal exam  Exercise tolerance: good (4-7 METS)    ECG reviewed  PT is on anticoagulation therapy    (+) hypertension well controlled less than 2 medications, past MI  >12 months, CAD, CABG > 6 Months, cardiac stents more than 12 months ago hyperlipidemia,     ROS comment: Cardiac Clearance per Dr. Parks on 6/20/18. Pt remains on low dose ASA therapy    Pt reports 3 vessel CABG in 2010 additionally pt has 2 reported cardiac stents.    Neuro/Psych  GI/Hepatic/Renal/Endo    (+)  GERD well controlled,  hypothyroidism,     Musculoskeletal     Abdominal  - normal exam   Substance History      OB/GYN          Other                      Anesthesia Plan    ASA 2     MAC and regional   (Patient advised that intravenous sedation would be used as the primary anesthetic, along with local anesthesia if necessary. Every effort will be made to make sure the patient is comfortable.     The patient was told that they may experience recall of the procedure. The patient was further advised that if the MAC anesthetic was deemed ineffective that general anesthesia administered via LMA or ETT may be required.    Infraclavicular block discussed with patient. Risks / benefits discussed. Pt verbalized understanding and agreed to plan.    Patient verbalized understanding and agreed to plan of care. )  intravenous induction   Anesthetic plan and risks discussed with patient.

## 2018-06-22 NOTE — DISCHARGE INSTRUCTIONS
Please follow all post op instructions and follow up appointment time from your physician's office included in your discharge packet.    Keep the affected extremity elevated above  level of the heart.  Use your ice pack as instructed, do not use continuously.  Use your sling as directed    Follow your physicians instructions as previously directed.    No pushing, pulling, tugging,  heavy lifting, or strenuous activity.  No major decision making, driving, or drinking alcoholic beverages for 24 hours. ( due to the medications you have  received)  Always use good hand hygiene/washing techniques.  NO driving while taking pain medications.    To assist you in voiding:  Drink plenty of fluids  Listen to running water while attempting to void.    If you are unable to urinate and you have an uncomfortable urge to void or it has been   6 hours since you were discharged, return to the Emergency Room

## 2018-06-22 NOTE — ANESTHESIA POSTPROCEDURE EVALUATION
Patient: Remington Rojas    Procedure Summary     Date:  06/22/18 Room / Location:  [facility] OR  /  [facility] OR    Anesthesia Start:  1244 Anesthesia Stop:  1345    Procedure:  OPEN REDUCTION INTERNAL FIXATION WITH VOLAR PLATE (SYNTHES), RIGHT WRIST (Right Wrist) Diagnosis:       Preop examination      (Preop examination [Z01.818])    Surgeon:  Sen Flanagan MD Provider:  Candido Nichols CRNA    Anesthesia Type:  MAC, regional ASA Status:  2          Anesthesia Type: MAC, regional  Last vitals  BP   99/57 (06/22/18 1348)   Temp   98.9 °F (37.2 °C) (06/22/18 1348)   Pulse   76 (06/22/18 1348)   Resp   18 (06/22/18 1348)     SpO2   94 % (06/22/18 1348)     Post Anesthesia Care and Evaluation    Patient location during evaluation: PACU  Patient participation: complete - patient participated  Level of consciousness: awake and alert  Pain score: 0  Pain management: satisfactory to patient  Airway patency: patent  Anesthetic complications: No anesthetic complications  PONV Status: none  Cardiovascular status: acceptable and hemodynamically stable  Respiratory status: acceptable  Hydration status: acceptable

## 2018-06-22 NOTE — OP NOTE
OPERATIVE REPORT      PATIENT NAME:  Remington Rojas                            YOB: 1935       PREOP DIAGNOSIS:   Closed displaced 3 part intra articular  Right distal radius fracture    POSTOP DIAGNOSIS:  Same.    PROCEDURE:    Open reduction and internal fixation of Right distal radius 3 part intra-articular  fracture.    SURGEON:     Sen Flanagan MD    OPERATIVE TEAM:   Circulator: Anya Hickman RN  Radiology Technologist: Steffany Pastrana  Scrub Person: Naheed Montana    ANESTHETIST:  CRNA: Candido Nichols CRNA    ANESTHESIA:   Choice    ESTIM BLOOD LOSS:   0 ml    FINDINGS:       right 3 part intra articular distal radius fracture with dorsal comminution and dislacement.    SPECIMENS:    None.    Tourniquet time 40 min    IMPLANTS:     Synthes volar distal locking plate    DRAINS:     None.    COMPLICATIONS:    None.    DISPOSITION:    Stable to recovery.    INDICATIONS:    Displaced, unstable distal radial fracture.    NARRATIVE:    The patient sustained a traumatic injury to the arm and wrist with clinical exam and imaging, after appropriate immobilization, to reveal an unstable fracture of the distal radius.  The option of surgical reduction and stabilization for the unstable fracture or continued nonsurgical management was reviewed.  The risks, benefits and alternatives were discussed and informed consent for the procedure obtained. Risks discussed including but not limited to anesthesia, infection, neurovascular injury, fracture malunion, nonunion, hardware issues with loosening or the need for possible future surgery,  pain, post-traumatic arthritis, and persistent pain or limitations from the injury condition.  Goals include the potential for earlier pain relief, improved fracture position, alignment and healing potential, improved arm and hand mobility and function.  After appropriate discussion of the risks and complications with the patient and all questions answered to  satisfaction, verbal as well as written permission was given  to proceed with the surgery. The patient presents for planned surgery.    Patient was brought to the operating room administered a suitable level of anesthesia placed in the supine position given IV antibiotic prophylaxis and a time out was called to confirm the correct nature of the procedure.  Standard prep and drape of the extremity was carried out in usual manner.  Esmarch bandage was used for exsanguination and the upper arm tourniquet was inflated to 250 mmHg.  Using the flexor carpi radialis tendon exposure approach, with loupe magnification and bipolar electrocautery,  the flexor carpi radialis tendon sheath was incised longitudinally taking care to identify and protect superficial radial nerve and radial artery structures.  Pronator quadratus was dissected sharply using a knife off the radial origin reflected ulnarly exposing the fracture site.  Care was taken to minimize soft tissue dissection of fracture fragments and using a combination of ligamentotaxis and direct reduction of fracture fragments were reduced into anatomic position confirmed to be satisfactory with image intensification and the appropriate size volar plate was selected and provisionally pinned to the distal radius using image intensification. Slight comminution of the dorsal distal radius was present but the articular surface was reduced to neutral position by applying traction and manual  volar pressure.  Distal locking screw placement was placed in standard fashion followed by bicortical proximal plate fixation in the usual manner. The provisional  pin was removed from the distal radius,  image intensifier was used to confirm satisfactory reduction was achieved and held with the plate and all screws of appropriate length none impinging on the distal radius articular surface.  There were no signs of carpal instability and this was confirmed by fluoroscopic dynamic exam of the  wrist.  Standard irrigation of the wound was carried out followed by closure by interrupted approximation of the pronator quadratus to its origin on the radial border of the radius, this was followed by interrupted subcutaneous 2-0 Vicryl suture and running nylon suture to reapproximate skin edges.  Dressings were applied the tourniquet was deflated and the patient returned to recovery room in stable condition with no complications.

## 2018-06-22 NOTE — ANESTHESIA PROCEDURE NOTES
Peripheral Block    Patient location during procedure: pre-op  Start time: 6/22/2018 12:19 PM  Stop time: 6/22/2018 12:23 PM  Reason for block: at surgeon's request and post-op pain management  Performed by  CRNA: CARLA GARCIA  Preanesthetic Checklist  Completed: patient identified, site marked, surgical consent, pre-op evaluation, timeout performed, IV checked, risks and benefits discussed and monitors and equipment checked  Prep:  Pt Position: supine  Sterile barriers:cap, gloves, mask and sterile barriers  Prep: ChloraPrep  Patient monitoring: blood pressure monitoring, continuous pulse oximetry and EKG  Procedure  Sedation:yes  Performed under: local infiltration  Guidance:ultrasound guided  ULTRASOUND INTERPRETATION. Using ultrasound guidance a gauge needle was placed in close proximity to the brachial plexus nerve, at which point, under ultrasound guidance anesthetic was injected in the area of the nerve and spread of the anesthesia was seen on ultrasound in close proximity thereto.  There were no abnormalities seen on ultrasound; a digital image was taken; and the patient tolerated the procedure with no complications. Images:still images obtained    Laterality:right  Block Type:infraclavicular    Needle Type:echogenic  Needle Gauge:21 G  Resistance on Injection: none  Medications  Comment:Adjuncts per total volume of LA: 20    Decadron  mg PSF      If required, intravenous sedation was given -- see meds on anesthesia record.  Local Injected:bupivacaine 0.5%  Post Assessment  Injection Assessment: negative aspiration for heme, no paresthesia on injection and incremental injection  Patient Tolerance:comfortable throughout block  Complications:no  Additional Notes       Procedure:               SINGLE SHOT INFRACLAVICULAR                                       Patient analgesia was achieved with IV Sedation( see meds)       The pt was placed in semi-fowlers position with a slight tilt of the thorax  contralateral to the insertion site.  The Insertion Site was prepped and draped in sterile fashion.  The skin was anesthetized with Lidocaine 1% 1ml injection utilizing a 25g needle.  Utilizing ultrasound guidance, a BBraun 20 ga echogenic needle was advanced in-plane.  Major vessels (carotid and Internal Jugular) were visualized as the brachial plexus was approached anterior.  The Lateral and Medial cords were identified.  The needle tip was placed posterior to the subclavian artery and Local anesthesia was injected incrementally every 5 mls with aspiration. Injection pressure was normal or little; there was no intraneural injection, no vascular injection.

## 2018-06-28 ENCOUNTER — OFFICE VISIT (OUTPATIENT)
Dept: ORTHOPEDIC SURGERY | Facility: CLINIC | Age: 83
End: 2018-06-28

## 2018-06-28 VITALS — HEIGHT: 69 IN | RESPIRATION RATE: 18 BRPM | BODY MASS INDEX: 28.88 KG/M2 | WEIGHT: 195 LBS

## 2018-06-28 DIAGNOSIS — S52.531D CLOSED COLLES' FRACTURE OF RIGHT RADIUS WITH ROUTINE HEALING, SUBSEQUENT ENCOUNTER: Primary | ICD-10-CM

## 2018-06-28 PROCEDURE — 99024 POSTOP FOLLOW-UP VISIT: CPT | Performed by: ORTHOPAEDIC SURGERY

## 2018-06-28 RX ORDER — LEVOTHYROXINE SODIUM 0.07 MG/1
TABLET ORAL
Status: ON HOLD | COMMUNITY
Start: 2018-05-31 | End: 2019-11-25

## 2018-06-28 NOTE — PROGRESS NOTES
"Subjective   Patient ID: Remington Rojas is a 82 y.o. male  Post-op and Pain of the Right Wrist (He states his wrist only hurts every now and then, he is here today for a dressing change and wound check.)             History of Present Illness  Postoperative wound check no complaints still some coffee on a splint this morning otherwise very stable      Review of Systems   Constitutional: Negative for fever.   HENT: Negative for voice change.    Eyes: Negative for visual disturbance.   Respiratory: Negative for shortness of breath.    Cardiovascular: Negative for chest pain.   Gastrointestinal: Negative for abdominal distention and abdominal pain.   Genitourinary: Negative for dysuria.   Musculoskeletal: Positive for arthralgias. Negative for gait problem and joint swelling.   Skin: Negative for rash.   Neurological: Negative for speech difficulty.   Hematological: Does not bruise/bleed easily.   Psychiatric/Behavioral: Negative for confusion.       Past Medical History:   Diagnosis Date   • Acute myocardial infarction     \"ABOUT 8-10 YEARS AGO\" PATIENT REPORTS   • BPH (benign prostatic hyperplasia)    • CAD (coronary artery disease)    • Elevated cholesterol     REPORTS HAS BEEN ON MEDICATION IN THE PAST   • GERD without esophagitis    • [place] (hard of hearing)    • Hyperlipidemia     UNSPECIFIED   • Hypertension    • Hypothyroidism    • Overactive bladder    • Seasonal allergies    • Wears dentures         Past Surgical History:   Procedure Laterality Date   • CARDIAC CATHETERIZATION      REPORTS \"ABOUT 8-10 YEARS AGO\" HAD 2 STENTS PLACED   • CORONARY ARTERY BYPASS GRAFT  2006   • HERNIA REPAIR      BILATERAL INGUINAL HERNIA REPAIRS WITH MESH WITHIN 3 YEARS   • MOUTH SURGERY      FULL TEETH EXTRACTION   • ORIF WRIST FRACTURE Right 6/22/2018    Procedure: OPEN REDUCTION INTERNAL FIXATION WITH VOLAR PLATE (SYNTHES), RIGHT WRIST;  Surgeon: Sen Flanagan MD;  Location: [facility];  Service: Orthopedics   • OTHER " SURGICAL HISTORY      INGUINAL HERNIA REPAIR BILATERAL   • OTHER SURGICAL HISTORY      PREVIOUS STENT PLACEMENT       Family History   Problem Relation Age of Onset   • Coronary artery disease Mother    • Coronary artery disease Sister        Social History     Social History   • Marital status:      Spouse name: N/A   • Number of children: N/A   • Years of education: N/A     Occupational History   • Not on file.     Social History Main Topics   • Smoking status: Former Smoker   • Smokeless tobacco: Never Used   • Alcohol use No   • Drug use: No   • Sexual activity: Defer     Other Topics Concern   • Not on file     Social History Narrative   • No narrative on file       I have reviewed all of the above social hx, family hx, surgical hx, medications, allergies & ROS and confirm that it is accurate.    Allergies   Allergen Reactions   • Metoprolol Unknown (See Comments)     PATIENT DOES NOT RECALL ALLERGY REACTION OR TYPE, BUT DR BROWN (CARDIOTHORACIC SURGEON) TOLD HIM NOT TO TAKE IT.         Current Outpatient Prescriptions:   •  aspirin 81 MG tablet, Take 81 mg by mouth Daily., Disp: , Rfl:   •  famotidine (PEPCID) 20 MG tablet, Take 20 mg by mouth As Needed., Disp: , Rfl:   •  finasteride (PROSCAR) 5 MG tablet, Take 5 mg by mouth daily., Disp: , Rfl:   •  fluticasone (FLONASE) 50 MCG/ACT nasal spray, 1 spray into each nostril Daily., Disp: , Rfl:   •  HYDROcodone-acetaminophen (NORCO) 5-325 MG per tablet, Take 1-2 tablets by mouth Every 6 (Six) Hours As Needed for Moderate Pain, Disp: 30 tablet, Rfl: 0  •  levothyroxine (SYNTHROID, LEVOTHROID) 25 MCG tablet, Take 50 mcg by mouth Daily., Disp: , Rfl:   •  levothyroxine (SYNTHROID, LEVOTHROID) 75 MCG tablet, , Disp: , Rfl:   •  lisinopril (PRINIVIL,ZESTRIL) 2.5 MG tablet, Take 2.5 mg by mouth Daily., Disp: , Rfl:   •  nitroglycerin (NITROSTAT) 0.4 MG SL tablet, Place 0.4 mg under the tongue Every 5 (Five) Minutes As Needed., Disp: , Rfl:   •  Omega-3 Fatty  "Acids (FISH OIL PO), Take 2 capsules by mouth Daily., Disp: , Rfl:   •  tamsulosin (FLOMAX) 0.4 MG capsule 24 hr capsule, Take 0.4 mg by mouth Daily., Disp: , Rfl:     Objective   Resp 18   Ht 175.3 cm (69\")   Wt 88.5 kg (195 lb)   BMI 28.80 kg/m²    Physical Exam  Constitutional: Patient is oriented to person, place, and time. Patient appears well-developed and well-nourished.   HENT:Head: Normocephalic and atraumatic.   Eyes: EOM are normal. Pupils are equal, round, and reactive to light.   Neck: Normal range of motion. Neck supple.   Cardiovascular: Normal rate.    Pulmonary/Chest: Effort normal and breath sounds normal.   Abdominal: Soft.   Neurological: Patient is alert and oriented to person, place, and time.   Skin: Skin is warm and dry.   Psychiatric: Patient has a normal mood and affect.   Nursing note and vitals reviewed.       Ortho Exam   Dressing changed and wound appears well-healing no erythema or drainage fingers neurovascularly intact minimal swelling existing splint reapplied with new sterile dressing return next week for suture removal    Assessment/Plan   Review of Radiographic Studies:    No new imaging done today.      Procedures     Remington was seen today for post-op and pain.    Diagnoses and all orders for this visit:    Closed Colles' fracture of right radius with routine healing, subsequent encounter       Orthopedic activities reviewed and patient expressed appreciation      Recommendations/Plan:   Work/Activity Status: No use of involved extremity    Patient agreeable to call or return sooner for any concerns.             Impression:  Status post right distal radial ORIF with volar plate  Plan:  Return next week suture removal Steri-Strips new short arm cast and then x-rays right wrist in new cast  "

## 2018-07-02 DIAGNOSIS — S52.531D CLOSED COLLES' FRACTURE OF RIGHT RADIUS WITH ROUTINE HEALING, SUBSEQUENT ENCOUNTER: Primary | ICD-10-CM

## 2018-07-05 ENCOUNTER — OFFICE VISIT (OUTPATIENT)
Dept: ORTHOPEDIC SURGERY | Facility: CLINIC | Age: 83
End: 2018-07-05

## 2018-07-05 VITALS — WEIGHT: 194 LBS | RESPIRATION RATE: 18 BRPM | HEIGHT: 69 IN | BODY MASS INDEX: 28.73 KG/M2

## 2018-07-05 DIAGNOSIS — S52.531D CLOSED COLLES' FRACTURE OF RIGHT RADIUS WITH ROUTINE HEALING, SUBSEQUENT ENCOUNTER: Primary | ICD-10-CM

## 2018-07-05 PROCEDURE — 99024 POSTOP FOLLOW-UP VISIT: CPT | Performed by: ORTHOPAEDIC SURGERY

## 2018-07-05 PROCEDURE — 29075 APPL CST ELBW FNGR SHORT ARM: CPT | Performed by: ORTHOPAEDIC SURGERY

## 2018-07-05 NOTE — PROGRESS NOTES
"Subjective   Patient ID: Remington Rojas is a 82 y.o. male  Post-op of the Right Wrist (Patient is here today for 1st post op and suture removal, he will be placed in a short arm cast. Patient denies any pain unless he twists his wrist.)             History of Present Illness  No new complaints swelling much improved ready for his cast      Review of Systems   Constitutional: Negative for fever.   HENT: Negative for voice change.    Eyes: Negative for visual disturbance.   Respiratory: Negative for shortness of breath.    Cardiovascular: Negative for chest pain.   Gastrointestinal: Negative for abdominal distention and abdominal pain.   Genitourinary: Negative for dysuria.   Musculoskeletal: Negative for arthralgias, gait problem and joint swelling.   Skin: Negative for rash.   Neurological: Negative for speech difficulty.   Hematological: Does not bruise/bleed easily.   Psychiatric/Behavioral: Negative for confusion.       Past Medical History:   Diagnosis Date   • Acute myocardial infarction     \"ABOUT 8-10 YEARS AGO\" PATIENT REPORTS   • BPH (benign prostatic hyperplasia)    • CAD (coronary artery disease)    • Elevated cholesterol     REPORTS HAS BEEN ON MEDICATION IN THE PAST   • GERD without esophagitis    • [place] (hard of hearing)    • Hyperlipidemia     UNSPECIFIED   • Hypertension    • Hypothyroidism    • Overactive bladder    • Seasonal allergies    • Wears dentures         Past Surgical History:   Procedure Laterality Date   • CARDIAC CATHETERIZATION      REPORTS \"ABOUT 8-10 YEARS AGO\" HAD 2 STENTS PLACED   • CORONARY ARTERY BYPASS GRAFT  2006   • HERNIA REPAIR      BILATERAL INGUINAL HERNIA REPAIRS WITH MESH WITHIN 3 YEARS   • MOUTH SURGERY      FULL TEETH EXTRACTION   • ORIF WRIST FRACTURE Right 6/22/2018    Procedure: OPEN REDUCTION INTERNAL FIXATION WITH VOLAR PLATE (SYNTHES), RIGHT WRIST;  Surgeon: Sen Flanagan MD;  Location: [facility];  Service: Orthopedics   • OTHER SURGICAL HISTORY      INGUINAL " HERNIA REPAIR BILATERAL   • OTHER SURGICAL HISTORY      PREVIOUS STENT PLACEMENT       Family History   Problem Relation Age of Onset   • Coronary artery disease Mother    • Coronary artery disease Sister        Social History     Social History   • Marital status:      Spouse name: N/A   • Number of children: N/A   • Years of education: N/A     Occupational History   • Not on file.     Social History Main Topics   • Smoking status: Former Smoker   • Smokeless tobacco: Never Used   • Alcohol use No   • Drug use: No   • Sexual activity: Defer     Other Topics Concern   • Not on file     Social History Narrative   • No narrative on file       I have reviewed all of the above social hx, family hx, surgical hx, medications, allergies & ROS and confirm that it is accurate.    Allergies   Allergen Reactions   • Metoprolol Unknown (See Comments)     PATIENT DOES NOT RECALL ALLERGY REACTION OR TYPE, BUT DR BROWN (CARDIOTHORACIC SURGEON) TOLD HIM NOT TO TAKE IT.         Current Outpatient Prescriptions:   •  aspirin 81 MG tablet, Take 81 mg by mouth Daily., Disp: , Rfl:   •  famotidine (PEPCID) 20 MG tablet, Take 20 mg by mouth As Needed., Disp: , Rfl:   •  finasteride (PROSCAR) 5 MG tablet, Take 5 mg by mouth daily., Disp: , Rfl:   •  fluticasone (FLONASE) 50 MCG/ACT nasal spray, 1 spray into each nostril Daily., Disp: , Rfl:   •  HYDROcodone-acetaminophen (NORCO) 5-325 MG per tablet, Take 1-2 tablets by mouth Every 6 (Six) Hours As Needed for Moderate Pain, Disp: 30 tablet, Rfl: 0  •  levothyroxine (SYNTHROID, LEVOTHROID) 25 MCG tablet, Take 50 mcg by mouth Daily., Disp: , Rfl:   •  levothyroxine (SYNTHROID, LEVOTHROID) 75 MCG tablet, , Disp: , Rfl:   •  lisinopril (PRINIVIL,ZESTRIL) 2.5 MG tablet, Take 2.5 mg by mouth Daily., Disp: , Rfl:   •  nitroglycerin (NITROSTAT) 0.4 MG SL tablet, Place 0.4 mg under the tongue Every 5 (Five) Minutes As Needed., Disp: , Rfl:   •  Omega-3 Fatty Acids (FISH OIL PO), Take 2  "capsules by mouth Daily., Disp: , Rfl:   •  tamsulosin (FLOMAX) 0.4 MG capsule 24 hr capsule, Take 0.4 mg by mouth Daily., Disp: , Rfl:     Objective   Resp 18   Ht 175.3 cm (69\")   Wt 88 kg (194 lb)   BMI 28.65 kg/m²    Physical Exam  Constitutional: Patient is oriented to person, place, and time. Patient appears well-developed and well-nourished.   HENT:Head: Normocephalic and atraumatic.   Eyes: EOM are normal. Pupils are equal, round, and reactive to light.   Neck: Normal range of motion. Neck supple.   Cardiovascular: Normal rate.    Pulmonary/Chest: Effort normal and breath sounds normal.   Abdominal: Soft.   Neurological: Patient is alert and oriented to person, place, and time.   Skin: Skin is warm and dry.   Psychiatric: Patient has a normal mood and affect.   Nursing note and vitals reviewed.       Ortho Exam   Right wrist incision well-healed sutures removed stairs should applied new short arm fiberglass cast applied and x-rays taken in new cast    Assessment/Plan   Review of Radiographic Studies:    Radiographic images today of fracture I personally viewed and confirm satisfactory alignment.      Right short arm fiberglass cast application  Date/Time: 7/5/2018 10:52 AM  Performed by: PAM CLAY  Authorized by: PAM CLAY   Consent: Verbal consent obtained.  Consent given by: patient  Patient understanding: patient states understanding of the procedure being performed  Location details: right arm  Supplies used: Ortho-Glass  Post-procedure: The splinted body part was neurovascularly unchanged following the procedure.  Patient tolerance: Patient tolerated the procedure well with no immediate complications           Remington was seen today for post-op.    Diagnoses and all orders for this visit:    Closed Colles' fracture of right radius with routine healing, subsequent encounter    Other orders  -     Splint Application       Orthopedic activities reviewed and patient expressed " appreciation      Recommendations/Plan:   Work/Activity Status: No use of involved extremity    Patient agreeable to call or return sooner for any concerns.             Impression: Status post right distal radial fracture ORIF with volar plating   Plan:  Return in 4-5 weeks for removal of cast x-rays right wrist out of cast

## 2018-07-16 ENCOUNTER — OFFICE VISIT (OUTPATIENT)
Dept: ORTHOPEDIC SURGERY | Facility: CLINIC | Age: 83
End: 2018-07-16

## 2018-07-16 VITALS — RESPIRATION RATE: 18 BRPM | HEIGHT: 69 IN | BODY MASS INDEX: 28.73 KG/M2 | WEIGHT: 194 LBS

## 2018-07-16 DIAGNOSIS — S52.531D CLOSED COLLES' FRACTURE OF RIGHT RADIUS WITH ROUTINE HEALING, SUBSEQUENT ENCOUNTER: Primary | ICD-10-CM

## 2018-07-16 PROCEDURE — 99024 POSTOP FOLLOW-UP VISIT: CPT | Performed by: ORTHOPAEDIC SURGERY

## 2018-07-16 NOTE — PROGRESS NOTES
"Subjective   Patient ID: Remington Rojas is a 82 y.o. right hand dominant male is here today for a post-operative visit.    Post-op of the Right Wrist        History of Present Illness      Pain controlled: [] no   [x] yes   Medication refill requested: [x] no   [] yes    Patient compliant with instructions: [] no   [x] yes   Other: Reports good progress since surgery.  He does complain of some persistent pain under his cast at the mid dorsal forearm.     Past Medical History:   Diagnosis Date   • Acute myocardial infarction     \"ABOUT 8-10 YEARS AGO\" PATIENT REPORTS   • BPH (benign prostatic hyperplasia)    • CAD (coronary artery disease)    • Elevated cholesterol     REPORTS HAS BEEN ON MEDICATION IN THE PAST   • GERD without esophagitis    • [place] (hard of hearing)    • Hyperlipidemia     UNSPECIFIED   • Hypertension    • Hypothyroidism    • Overactive bladder    • Seasonal allergies    • Wears dentures         Past Surgical History:   Procedure Laterality Date   • CARDIAC CATHETERIZATION      REPORTS \"ABOUT 8-10 YEARS AGO\" HAD 2 STENTS PLACED   • CORONARY ARTERY BYPASS GRAFT  2006   • HERNIA REPAIR      BILATERAL INGUINAL HERNIA REPAIRS WITH MESH WITHIN 3 YEARS   • MOUTH SURGERY      FULL TEETH EXTRACTION   • ORIF WRIST FRACTURE Right 6/22/2018    Procedure: OPEN REDUCTION INTERNAL FIXATION WITH VOLAR PLATE (SYNTHES), RIGHT WRIST;  Surgeon: Sen Flanagan MD;  Location: [facility];  Service: Orthopedics   • OTHER SURGICAL HISTORY      INGUINAL HERNIA REPAIR BILATERAL   • OTHER SURGICAL HISTORY      PREVIOUS STENT PLACEMENT       Allergies   Allergen Reactions   • Metoprolol Unknown (See Comments)     PATIENT DOES NOT RECALL ALLERGY REACTION OR TYPE, BUT DR BROWN (CARDIOTHORACIC SURGEON) TOLD HIM NOT TO TAKE IT.       Review of Systems   Constitutional: Negative for fever.   Skin: Negative for color change and rash.       Objective   Resp 18   Ht 175.3 cm (69\")   Wt 88 kg (194 lb)   BMI 28.65 kg/m²       Signs " of infection: [x] no                    [] yes   Drainage: [x] no                    [] yes   Incision: [] healing well     [x]healed well - pristine   Motor exam intact: [] no                    [x] yes   Neurovascular exam intact: [] no                    [x] yes   Signs of compartment syndrome: [x] no                    [] yes   Signs of DVT: [x] no                    [] yes   Other:      Physical Exam   Constitutional: He appears well-developed. No distress.   Musculoskeletal: He exhibits no deformity.   Neurological: He is alert. Gait normal.   Skin: Skin is warm and dry. No rash noted. No erythema.   Psychiatric: He has a normal mood and affect. His speech is normal.   Vitals reviewed.    Right Hand Exam     Tenderness   The patient is experiencing tenderness in the dorsal area (that resolved with cast removal  Skin normal with no change.).    Range of Motion     Wrist   Extension: 20 (note patient just out of cast with expected stiffness.)   Flexion: 30     Muscle Strength   Right wrist normal muscle strength: EPL intact.  Wrist Extension: 4/5   Wrist Flexion: 4/5   : 4/5     Tests   Tinel’s Sign (Medial Nerve): negative    Other   Erythema: absent  Scars: present (healed well)  Sensation: normal  Pulse: present            Neurologic Exam     Mental Status   Attention: normal.   Speech: speech is normal   Level of consciousness: alert  Knowledge: good.     Motor Exam   Overall muscle tone: normal    Gait, Coordination, and Reflexes     Gait  Gait: normal    Right Hand/Wrist Exam     Muscle Strength   Right wrist normal muscle strength: EPL intact.          Assessment/Plan   Independent Review of Radiographic Studies:    No new imaging done today.  Reviewed the recent prior wrist imaging and the fracture is anatomically reduced, with interval progression toward healing, and Dr. Flanagan's wrist plate and screws position and alignment is excellent.  Laboratory and Other Studies:  No new results reviewed today.    Medical Decision Making:    No complications of procedure and treatments.  Stable neurovascular exam.  Stable post-operative exam and expected early progress.  Good progress, significantly improved.  Continue current plan, and management.    Procedures     Remington was seen today for post-op.    Diagnoses and all orders for this visit:    Closed Colles' fracture of right radius with routine healing, subsequent encounter  -     Ambulatory Referral to Physical Therapy Evaluate and treat         Recommendations/Plan:     Sutures Staples or Pins [] Removed today  [x] At prior visit  [] Plan removal later   Ultrasound: [x]not ordered         []order given to patient   Labs: [x]not ordered         []order given to patient     Discussion of orthopaedic goals and activities and patient and/or guardian expressed appreciation.  Regular exercise as tolerated  Guided on proper techniques for mobility, strength, agility and/or conditioning exercises  Ice, heat, and/or modalities as beneficial  Cast, splint and/or brace care and usage instructions given  Physical therapy referral given    Exercise, medications, injections, other patient advice, and return appointment as noted.  Brace: Wrist brace  Referral: Physical and/or Occupational Therapy referral  Test/Studies: No additional studies ordered.  Surgery: No surgery proposed at this visit.  Work/Activity Status: May perform usual activities as tolerated and No strenuous activity.  Patient is encouraged to call or return for any issues or concerns.    Return for Recheck- with Dr. Flanagan at scheduled appt. .  Patient agreeable to call or return sooner for any concerns.

## 2018-08-01 DIAGNOSIS — S52.531D CLOSED COLLES' FRACTURE OF RIGHT RADIUS WITH ROUTINE HEALING, SUBSEQUENT ENCOUNTER: Primary | ICD-10-CM

## 2018-08-06 ENCOUNTER — OFFICE VISIT (OUTPATIENT)
Dept: ORTHOPEDIC SURGERY | Facility: CLINIC | Age: 83
End: 2018-08-06

## 2018-08-06 VITALS — BODY MASS INDEX: 28.58 KG/M2 | RESPIRATION RATE: 18 BRPM | HEIGHT: 69 IN | WEIGHT: 193 LBS

## 2018-08-06 DIAGNOSIS — S52.531D CLOSED COLLES' FRACTURE OF RIGHT RADIUS WITH ROUTINE HEALING, SUBSEQUENT ENCOUNTER: Primary | ICD-10-CM

## 2018-08-06 PROCEDURE — 99024 POSTOP FOLLOW-UP VISIT: CPT | Performed by: ORTHOPAEDIC SURGERY

## 2018-08-06 NOTE — PROGRESS NOTES
"Subjective   Patient ID: Remington Rojas is a 82 y.o. male  Follow-up and Pain of the Right Wrist (Patient states his pain has improved a lot, he is still doing therapy and states that's helping a lot.)             History of Present Illness    Minimal pain good functional use of the hand status post ORIF right distal radial fracture with volar plating    Review of Systems   Constitutional: Negative for fever.   HENT: Negative for voice change.    Eyes: Negative for visual disturbance.   Respiratory: Negative for shortness of breath.    Cardiovascular: Negative for chest pain.   Gastrointestinal: Negative for abdominal distention and abdominal pain.   Genitourinary: Negative for dysuria.   Musculoskeletal: Positive for arthralgias. Negative for gait problem and joint swelling.   Skin: Negative for rash.   Neurological: Negative for speech difficulty.   Hematological: Does not bruise/bleed easily.   Psychiatric/Behavioral: Negative for confusion.       Past Medical History:   Diagnosis Date   • Acute myocardial infarction     \"ABOUT 8-10 YEARS AGO\" PATIENT REPORTS   • BPH (benign prostatic hyperplasia)    • CAD (coronary artery disease)    • Elevated cholesterol     REPORTS HAS BEEN ON MEDICATION IN THE PAST   • GERD without esophagitis    • [place] (hard of hearing)    • Hyperlipidemia     UNSPECIFIED   • Hypertension    • Hypothyroidism    • Overactive bladder    • Seasonal allergies    • Wears dentures         Past Surgical History:   Procedure Laterality Date   • CARDIAC CATHETERIZATION      REPORTS \"ABOUT 8-10 YEARS AGO\" HAD 2 STENTS PLACED   • CORONARY ARTERY BYPASS GRAFT  2006   • HERNIA REPAIR      BILATERAL INGUINAL HERNIA REPAIRS WITH MESH WITHIN 3 YEARS   • MOUTH SURGERY      FULL TEETH EXTRACTION   • ORIF WRIST FRACTURE Right 6/22/2018    Procedure: OPEN REDUCTION INTERNAL FIXATION WITH VOLAR PLATE (SYNTHES), RIGHT WRIST;  Surgeon: Sen Flanagan MD;  Location: [facility];  Service: Orthopedics   • OTHER " SURGICAL HISTORY      INGUINAL HERNIA REPAIR BILATERAL   • OTHER SURGICAL HISTORY      PREVIOUS STENT PLACEMENT       Family History   Problem Relation Age of Onset   • Coronary artery disease Mother    • Coronary artery disease Sister        Social History     Social History   • Marital status:      Spouse name: N/A   • Number of children: N/A   • Years of education: N/A     Occupational History   • Not on file.     Social History Main Topics   • Smoking status: Former Smoker   • Smokeless tobacco: Never Used   • Alcohol use No   • Drug use: No   • Sexual activity: Defer     Other Topics Concern   • Not on file     Social History Narrative   • No narrative on file       I have reviewed all of the above social hx, family hx, surgical hx, medications, allergies & ROS and confirm that it is accurate.    Allergies   Allergen Reactions   • Metoprolol Unknown (See Comments)     PATIENT DOES NOT RECALL ALLERGY REACTION OR TYPE, BUT DR BROWN (CARDIOTHORACIC SURGEON) TOLD HIM NOT TO TAKE IT.         Current Outpatient Prescriptions:   •  aspirin 81 MG tablet, Take 81 mg by mouth Daily., Disp: , Rfl:   •  famotidine (PEPCID) 20 MG tablet, Take 20 mg by mouth As Needed., Disp: , Rfl:   •  finasteride (PROSCAR) 5 MG tablet, Take 5 mg by mouth daily., Disp: , Rfl:   •  fluticasone (FLONASE) 50 MCG/ACT nasal spray, 1 spray into each nostril Daily., Disp: , Rfl:   •  HYDROcodone-acetaminophen (NORCO) 5-325 MG per tablet, Take 1-2 tablets by mouth Every 6 (Six) Hours As Needed for Moderate Pain, Disp: 30 tablet, Rfl: 0  •  levothyroxine (SYNTHROID, LEVOTHROID) 25 MCG tablet, Take 50 mcg by mouth Daily., Disp: , Rfl:   •  levothyroxine (SYNTHROID, LEVOTHROID) 75 MCG tablet, , Disp: , Rfl:   •  lisinopril (PRINIVIL,ZESTRIL) 2.5 MG tablet, Take 2.5 mg by mouth Daily., Disp: , Rfl:   •  nitroglycerin (NITROSTAT) 0.4 MG SL tablet, Place 0.4 mg under the tongue Every 5 (Five) Minutes As Needed., Disp: , Rfl:   •  Omega-3 Fatty  "Acids (FISH OIL PO), Take 2 capsules by mouth Daily., Disp: , Rfl:   •  tamsulosin (FLOMAX) 0.4 MG capsule 24 hr capsule, Take 0.4 mg by mouth Daily., Disp: , Rfl:     Objective   Resp 18   Ht 175.3 cm (69\")   Wt 87.5 kg (193 lb)   BMI 28.50 kg/m²    Physical Exam  Constitutional: Patient is oriented to person, place, and time. Patient appears well-developed and well-nourished.   HENT:Head: Normocephalic and atraumatic.   Eyes: EOM are normal. Pupils are equal, round, and reactive to light.   Neck: Normal range of motion. Neck supple.   Cardiovascular: Normal rate.    Pulmonary/Chest: Effort normal and breath sounds normal.   Abdominal: Soft.   Neurological: Patient is alert and oriented to person, place, and time.   Skin: Skin is warm and dry.   Psychiatric: Patient has a normal mood and affect.   Nursing note and vitals reviewed.       Ortho Exam   Right wrist area incision well-healed slight loss of active dorsiflexion with minimal pain no swelling neurovascular status intact    Assessment/Plan   Review of Radiographic Studies:    Radiographic images today of fracture I personally viewed and confirm satisfactory alignment.      Procedures     Remington was seen today for follow-up and pain.    Diagnoses and all orders for this visit:    Closed Colles' fracture of right radius with routine healing, subsequent encounter       Physical therapy referral given      Recommendations/Plan:   Referral: PT/OT 2-3 x wk x 4-6 wks    Patient agreeable to call or return sooner for any concerns.             Impression:  Status post right distal radial ORIF with volar plate  Plan:  Continue therapy for strengthening recheck 6 weeks no x-rays necessary at that time  "

## 2018-09-17 ENCOUNTER — OFFICE VISIT (OUTPATIENT)
Dept: ORTHOPEDIC SURGERY | Facility: CLINIC | Age: 83
End: 2018-09-17

## 2018-09-17 VITALS — HEIGHT: 69 IN | RESPIRATION RATE: 18 BRPM | WEIGHT: 202 LBS | BODY MASS INDEX: 29.92 KG/M2

## 2018-09-17 DIAGNOSIS — S52.531D CLOSED COLLES' FRACTURE OF RIGHT RADIUS WITH ROUTINE HEALING, SUBSEQUENT ENCOUNTER: Primary | ICD-10-CM

## 2018-09-17 PROCEDURE — 99024 POSTOP FOLLOW-UP VISIT: CPT | Performed by: ORTHOPAEDIC SURGERY

## 2018-09-17 NOTE — PROGRESS NOTES
"Subjective   Patient ID: Remington Rojas is a 82 y.o. male  Follow-up of the Right Wrist (Patient states right wrist is feeling better, he denies any pain and notes good range of motion.)             History of Present Illness    He is doing very well with his right wrist has no pain little swelling in his fingers very happy with the strength has finished his therapy last Thursday.    Review of Systems   Constitutional: Negative for diaphoresis, fever and unexpected weight change.   HENT: Negative for dental problem and sore throat.    Eyes: Negative for visual disturbance.   Respiratory: Negative for shortness of breath.    Cardiovascular: Negative for chest pain.   Gastrointestinal: Negative for abdominal pain, constipation, diarrhea, nausea and vomiting.   Genitourinary: Negative for difficulty urinating and frequency.   Musculoskeletal: Positive for arthralgias.   Neurological: Negative for headaches.   Hematological: Does not bruise/bleed easily.   All other systems reviewed and are negative.      Past Medical History:   Diagnosis Date   • Acute myocardial infarction     \"ABOUT 8-10 YEARS AGO\" PATIENT REPORTS   • BPH (benign prostatic hyperplasia)    • CAD (coronary artery disease)    • Elevated cholesterol     REPORTS HAS BEEN ON MEDICATION IN THE PAST   • GERD without esophagitis    • [place] (hard of hearing)    • Hyperlipidemia     UNSPECIFIED   • Hypertension    • Hypothyroidism    • Overactive bladder    • Seasonal allergies    • Wears dentures         Past Surgical History:   Procedure Laterality Date   • CARDIAC CATHETERIZATION      REPORTS \"ABOUT 8-10 YEARS AGO\" HAD 2 STENTS PLACED   • CORONARY ARTERY BYPASS GRAFT  2006   • HERNIA REPAIR      BILATERAL INGUINAL HERNIA REPAIRS WITH MESH WITHIN 3 YEARS   • MOUTH SURGERY      FULL TEETH EXTRACTION   • ORIF WRIST FRACTURE Right 6/22/2018    Procedure: OPEN REDUCTION INTERNAL FIXATION WITH VOLAR PLATE (SYNTHES), RIGHT WRIST;  Surgeon: Sen Flanagan MD;  " Location: [facility] OR;  Service: Orthopedics   • OTHER SURGICAL HISTORY      INGUINAL HERNIA REPAIR BILATERAL   • OTHER SURGICAL HISTORY      PREVIOUS STENT PLACEMENT       Family History   Problem Relation Age of Onset   • Coronary artery disease Mother    • Coronary artery disease Sister        Social History     Social History   • Marital status:      Spouse name: N/A   • Number of children: N/A   • Years of education: N/A     Occupational History   • Not on file.     Social History Main Topics   • Smoking status: Former Smoker   • Smokeless tobacco: Never Used   • Alcohol use No   • Drug use: No   • Sexual activity: Defer     Other Topics Concern   • Not on file     Social History Narrative   • No narrative on file       I have reviewed all of the above social hx, family hx, surgical hx, medications, allergies & ROS and confirm that it is accurate.    Allergies   Allergen Reactions   • Metoprolol Unknown (See Comments)     PATIENT DOES NOT RECALL ALLERGY REACTION OR TYPE, BUT DR BROWN (CARDIOTHORACIC SURGEON) TOLD HIM NOT TO TAKE IT.         Current Outpatient Prescriptions:   •  aspirin 81 MG tablet, Take 81 mg by mouth Daily., Disp: , Rfl:   •  famotidine (PEPCID) 20 MG tablet, Take 20 mg by mouth As Needed., Disp: , Rfl:   •  finasteride (PROSCAR) 5 MG tablet, Take 5 mg by mouth daily., Disp: , Rfl:   •  fluticasone (FLONASE) 50 MCG/ACT nasal spray, 1 spray into each nostril Daily., Disp: , Rfl:   •  HYDROcodone-acetaminophen (NORCO) 5-325 MG per tablet, Take 1-2 tablets by mouth Every 6 (Six) Hours As Needed for Moderate Pain, Disp: 30 tablet, Rfl: 0  •  levothyroxine (SYNTHROID, LEVOTHROID) 25 MCG tablet, Take 50 mcg by mouth Daily., Disp: , Rfl:   •  levothyroxine (SYNTHROID, LEVOTHROID) 75 MCG tablet, , Disp: , Rfl:   •  lisinopril (PRINIVIL,ZESTRIL) 2.5 MG tablet, Take 2.5 mg by mouth Daily., Disp: , Rfl:   •  nitroglycerin (NITROSTAT) 0.4 MG SL tablet, Place 0.4 mg under the tongue Every 5 (Five)  "Minutes As Needed., Disp: , Rfl:   •  Omega-3 Fatty Acids (FISH OIL PO), Take 2 capsules by mouth Daily., Disp: , Rfl:   •  tamsulosin (FLOMAX) 0.4 MG capsule 24 hr capsule, Take 0.4 mg by mouth Daily., Disp: , Rfl:     Objective   Resp 18   Ht 175.3 cm (69\")   Wt 91.6 kg (202 lb)   BMI 29.83 kg/m²    Physical Exam  Constitutional: Patient is oriented to person, place, and time. Patient appears well-developed and well-nourished.   HENT:Head: Normocephalic and atraumatic.   Eyes: EOM are normal. Pupils are equal, round, and reactive to light.   Neck: Normal range of motion. Neck supple.   Cardiovascular: Normal rate.    Pulmonary/Chest: Effort normal and breath sounds normal.   Abdominal: Soft.   Neurological: Patient is alert and oriented to person, place, and time.   Skin: Skin is warm and dry.   Psychiatric: Patient has a normal mood and affect.   Nursing note and vitals reviewed.       Ortho Exam   Right wrist incisional area well healed no tenderness only 10° loss of dorsiflexion 10° loss supination full range of motion the MP joint slight swelling in the PIP joints consistent with osteoarthritis otherwise and is neurovascularly intact    Assessment/Plan   Review of Radiographic Studies:    No new imaging done today.      Procedures     Remington was seen today for follow-up.    Diagnoses and all orders for this visit:    Closed Colles' fracture of right radius with routine healing, subsequent encounter       Orthopedic activities reviewed and patient expressed appreciation      Recommendations/Plan:   Work/Activity Status: May perform usual activities as tolerated    Patient agreeable to call or return sooner for any concerns.             Impression:  Healed right distal radial Colles' fracture  Plan:  Home exercise return when necessary  "

## 2019-04-23 ENCOUNTER — TRANSCRIBE ORDERS (OUTPATIENT)
Dept: ADMINISTRATIVE | Facility: HOSPITAL | Age: 84
End: 2019-04-23

## 2019-04-23 DIAGNOSIS — R42 DIZZINESS AND GIDDINESS: Primary | ICD-10-CM

## 2019-05-02 ENCOUNTER — HOSPITAL ENCOUNTER (OUTPATIENT)
Dept: MRI IMAGING | Facility: HOSPITAL | Age: 84
Discharge: HOME | End: 2019-05-02
Admitting: FAMILY MEDICINE

## 2019-05-02 DIAGNOSIS — R42 DIZZINESS AND GIDDINESS: ICD-10-CM

## 2019-05-02 PROCEDURE — 70551 MRI BRAIN STEM W/O DYE: CPT

## 2019-11-25 ENCOUNTER — APPOINTMENT (OUTPATIENT)
Dept: CT IMAGING | Facility: HOSPITAL | Age: 84
End: 2019-11-25

## 2019-11-25 ENCOUNTER — APPOINTMENT (OUTPATIENT)
Dept: GENERAL RADIOLOGY | Facility: HOSPITAL | Age: 84
End: 2019-11-25

## 2019-11-25 ENCOUNTER — HOSPITAL ENCOUNTER (OUTPATIENT)
Facility: HOSPITAL | Age: 84
Setting detail: OBSERVATION
Discharge: HOME HEALTH | End: 2019-11-28
Attending: EMERGENCY MEDICINE | Admitting: INTERNAL MEDICINE

## 2019-11-25 DIAGNOSIS — R50.9 FEVER, UNSPECIFIED FEVER CAUSE: Primary | ICD-10-CM

## 2019-11-25 DIAGNOSIS — Z74.09 IMPAIRED MOBILITY AND ADLS: ICD-10-CM

## 2019-11-25 DIAGNOSIS — R11.2 NAUSEA AND VOMITING, INTRACTABILITY OF VOMITING NOT SPECIFIED, UNSPECIFIED VOMITING TYPE: ICD-10-CM

## 2019-11-25 DIAGNOSIS — Z78.9 IMPAIRED MOBILITY AND ADLS: ICD-10-CM

## 2019-11-25 LAB
ALBUMIN SERPL-MCNC: 4.1 G/DL (ref 3.5–5.2)
ALBUMIN/GLOB SERPL: 1.1 G/DL
ALP SERPL-CCNC: 70 U/L (ref 39–117)
ALT SERPL W P-5'-P-CCNC: 23 U/L (ref 1–41)
ANION GAP SERPL CALCULATED.3IONS-SCNC: 13.7 MMOL/L (ref 5–15)
AST SERPL-CCNC: 20 U/L (ref 1–40)
BACTERIA UR QL AUTO: ABNORMAL /HPF
BASOPHILS # BLD AUTO: 0.05 10*3/MM3 (ref 0–0.2)
BASOPHILS NFR BLD AUTO: 0.5 % (ref 0–1.5)
BILIRUB SERPL-MCNC: 0.9 MG/DL (ref 0.2–1.2)
BILIRUB UR QL STRIP: NEGATIVE
BUN BLD-MCNC: 13 MG/DL (ref 8–23)
BUN/CREAT SERPL: 10.6 (ref 7–25)
CALCIUM SPEC-SCNC: 8.9 MG/DL (ref 8.6–10.5)
CHLORIDE SERPL-SCNC: 98 MMOL/L (ref 98–107)
CLARITY UR: CLEAR
CO2 SERPL-SCNC: 21.3 MMOL/L (ref 22–29)
COLOR UR: YELLOW
CREAT BLD-MCNC: 1.23 MG/DL (ref 0.76–1.27)
D-LACTATE SERPL-SCNC: 1.6 MMOL/L (ref 0.5–2)
DEPRECATED RDW RBC AUTO: 41.1 FL (ref 37–54)
EOSINOPHIL # BLD AUTO: 0.01 10*3/MM3 (ref 0–0.4)
EOSINOPHIL NFR BLD AUTO: 0.1 % (ref 0.3–6.2)
ERYTHROCYTE [DISTWIDTH] IN BLOOD BY AUTOMATED COUNT: 11.6 % (ref 12.3–15.4)
FLUAV AG NPH QL: NEGATIVE
FLUBV AG NPH QL IA: NEGATIVE
GFR SERPL CREATININE-BSD FRML MDRD: 56 ML/MIN/1.73
GLOBULIN UR ELPH-MCNC: 3.7 GM/DL
GLUCOSE BLD-MCNC: 140 MG/DL (ref 65–99)
GLUCOSE UR STRIP-MCNC: NEGATIVE MG/DL
HCT VFR BLD AUTO: 49.7 % (ref 37.5–51)
HGB BLD-MCNC: 16.8 G/DL (ref 13–17.7)
HGB UR QL STRIP.AUTO: ABNORMAL
HOLD SPECIMEN: NORMAL
HOLD SPECIMEN: NORMAL
HYALINE CASTS UR QL AUTO: ABNORMAL /LPF
IMM GRANULOCYTES # BLD AUTO: 0.15 10*3/MM3 (ref 0–0.05)
IMM GRANULOCYTES NFR BLD AUTO: 1.4 % (ref 0–0.5)
KETONES UR QL STRIP: NEGATIVE
LEUKOCYTE ESTERASE UR QL STRIP.AUTO: NEGATIVE
LYMPHOCYTES # BLD AUTO: 0.67 10*3/MM3 (ref 0.7–3.1)
LYMPHOCYTES NFR BLD AUTO: 6.4 % (ref 19.6–45.3)
MCH RBC QN AUTO: 32.6 PG (ref 26.6–33)
MCHC RBC AUTO-ENTMCNC: 33.8 G/DL (ref 31.5–35.7)
MCV RBC AUTO: 96.3 FL (ref 79–97)
MONOCYTES # BLD AUTO: 0.55 10*3/MM3 (ref 0.1–0.9)
MONOCYTES NFR BLD AUTO: 5.2 % (ref 5–12)
NEUTROPHILS # BLD AUTO: 9.09 10*3/MM3 (ref 1.7–7)
NEUTROPHILS NFR BLD AUTO: 86.4 % (ref 42.7–76)
NITRITE UR QL STRIP: NEGATIVE
NRBC BLD AUTO-RTO: 0 /100 WBC (ref 0–0.2)
NT-PROBNP SERPL-MCNC: 285.7 PG/ML (ref 5–1800)
PH UR STRIP.AUTO: 6 [PH] (ref 5–8)
PLATELET # BLD AUTO: 203 10*3/MM3 (ref 140–450)
PMV BLD AUTO: 9.6 FL (ref 6–12)
POTASSIUM BLD-SCNC: 4.2 MMOL/L (ref 3.5–5.2)
PROCALCITONIN SERPL-MCNC: 0.1 NG/ML (ref 0.1–0.25)
PROT SERPL-MCNC: 7.8 G/DL (ref 6–8.5)
PROT UR QL STRIP: ABNORMAL
RBC # BLD AUTO: 5.16 10*6/MM3 (ref 4.14–5.8)
RBC # UR: ABNORMAL /HPF
REF LAB TEST METHOD: ABNORMAL
SODIUM BLD-SCNC: 133 MMOL/L (ref 136–145)
SP GR UR STRIP: >=1.03 (ref 1–1.03)
SQUAMOUS #/AREA URNS HPF: ABNORMAL /HPF
TROPONIN T SERPL-MCNC: <0.01 NG/ML (ref 0–0.03)
UROBILINOGEN UR QL STRIP: ABNORMAL
WBC NRBC COR # BLD: 10.52 10*3/MM3 (ref 3.4–10.8)
WBC UR QL AUTO: ABNORMAL /HPF
WHOLE BLOOD HOLD SPECIMEN: NORMAL
WHOLE BLOOD HOLD SPECIMEN: NORMAL

## 2019-11-25 PROCEDURE — P9612 CATHETERIZE FOR URINE SPEC: HCPCS

## 2019-11-25 PROCEDURE — 25010000002 ONDANSETRON PER 1 MG: Performed by: EMERGENCY MEDICINE

## 2019-11-25 PROCEDURE — 99285 EMERGENCY DEPT VISIT HI MDM: CPT

## 2019-11-25 PROCEDURE — 80053 COMPREHEN METABOLIC PANEL: CPT | Performed by: EMERGENCY MEDICINE

## 2019-11-25 PROCEDURE — 87804 INFLUENZA ASSAY W/OPTIC: CPT | Performed by: EMERGENCY MEDICINE

## 2019-11-25 PROCEDURE — 93005 ELECTROCARDIOGRAM TRACING: CPT | Performed by: EMERGENCY MEDICINE

## 2019-11-25 PROCEDURE — G0378 HOSPITAL OBSERVATION PER HR: HCPCS

## 2019-11-25 PROCEDURE — 84484 ASSAY OF TROPONIN QUANT: CPT | Performed by: EMERGENCY MEDICINE

## 2019-11-25 PROCEDURE — 83880 ASSAY OF NATRIURETIC PEPTIDE: CPT | Performed by: EMERGENCY MEDICINE

## 2019-11-25 PROCEDURE — 85025 COMPLETE CBC W/AUTO DIFF WBC: CPT | Performed by: EMERGENCY MEDICINE

## 2019-11-25 PROCEDURE — 83605 ASSAY OF LACTIC ACID: CPT | Performed by: EMERGENCY MEDICINE

## 2019-11-25 PROCEDURE — 96374 THER/PROPH/DIAG INJ IV PUSH: CPT

## 2019-11-25 PROCEDURE — 74177 CT ABD & PELVIS W/CONTRAST: CPT

## 2019-11-25 PROCEDURE — 71045 X-RAY EXAM CHEST 1 VIEW: CPT

## 2019-11-25 PROCEDURE — 25010000002 IOPAMIDOL 61 % SOLUTION: Performed by: EMERGENCY MEDICINE

## 2019-11-25 PROCEDURE — 87040 BLOOD CULTURE FOR BACTERIA: CPT | Performed by: EMERGENCY MEDICINE

## 2019-11-25 PROCEDURE — 81001 URINALYSIS AUTO W/SCOPE: CPT | Performed by: EMERGENCY MEDICINE

## 2019-11-25 PROCEDURE — 84145 PROCALCITONIN (PCT): CPT | Performed by: EMERGENCY MEDICINE

## 2019-11-25 RX ORDER — LEVOTHYROXINE SODIUM 88 UG/1
100 TABLET ORAL DAILY
COMMUNITY

## 2019-11-25 RX ORDER — SODIUM CHLORIDE 0.9 % (FLUSH) 0.9 %
10 SYRINGE (ML) INJECTION AS NEEDED
Status: DISCONTINUED | OUTPATIENT
Start: 2019-11-25 | End: 2019-11-28 | Stop reason: HOSPADM

## 2019-11-25 RX ORDER — ONDANSETRON 2 MG/ML
4 INJECTION INTRAMUSCULAR; INTRAVENOUS ONCE
Status: COMPLETED | OUTPATIENT
Start: 2019-11-25 | End: 2019-11-25

## 2019-11-25 RX ORDER — ACETAMINOPHEN 500 MG
1000 TABLET ORAL ONCE
Status: COMPLETED | OUTPATIENT
Start: 2019-11-25 | End: 2019-11-25

## 2019-11-25 RX ADMIN — ACETAMINOPHEN 1000 MG: 500 TABLET, FILM COATED ORAL at 21:12

## 2019-11-25 RX ADMIN — IOPAMIDOL 100 ML: 612 INJECTION, SOLUTION INTRAVENOUS at 20:10

## 2019-11-25 RX ADMIN — ONDANSETRON 4 MG: 2 INJECTION INTRAMUSCULAR; INTRAVENOUS at 19:39

## 2019-11-25 RX ADMIN — SODIUM CHLORIDE 1000 ML: 9 INJECTION, SOLUTION INTRAVENOUS at 19:39

## 2019-11-26 LAB
ALBUMIN SERPL-MCNC: 3.4 G/DL (ref 3.5–5.2)
ALBUMIN/GLOB SERPL: 1 G/DL
ALP SERPL-CCNC: 55 U/L (ref 39–117)
ALT SERPL W P-5'-P-CCNC: 20 U/L (ref 1–41)
ANION GAP SERPL CALCULATED.3IONS-SCNC: 11.8 MMOL/L (ref 5–15)
AST SERPL-CCNC: 24 U/L (ref 1–40)
BASOPHILS # BLD AUTO: 0.05 10*3/MM3 (ref 0–0.2)
BASOPHILS NFR BLD AUTO: 0.6 % (ref 0–1.5)
BILIRUB SERPL-MCNC: 0.7 MG/DL (ref 0.2–1.2)
BUN BLD-MCNC: 12 MG/DL (ref 8–23)
BUN/CREAT SERPL: 9.2 (ref 7–25)
CALCIUM SPEC-SCNC: 8.1 MG/DL (ref 8.6–10.5)
CHLORIDE SERPL-SCNC: 101 MMOL/L (ref 98–107)
CO2 SERPL-SCNC: 22.2 MMOL/L (ref 22–29)
CREAT BLD-MCNC: 1.31 MG/DL (ref 0.76–1.27)
DEPRECATED RDW RBC AUTO: 41.5 FL (ref 37–54)
EOSINOPHIL # BLD AUTO: 0.01 10*3/MM3 (ref 0–0.4)
EOSINOPHIL NFR BLD AUTO: 0.1 % (ref 0.3–6.2)
ERYTHROCYTE [DISTWIDTH] IN BLOOD BY AUTOMATED COUNT: 11.6 % (ref 12.3–15.4)
GFR SERPL CREATININE-BSD FRML MDRD: 53 ML/MIN/1.73
GLOBULIN UR ELPH-MCNC: 3.3 GM/DL
GLUCOSE BLD-MCNC: 122 MG/DL (ref 65–99)
HCT VFR BLD AUTO: 44.8 % (ref 37.5–51)
HGB BLD-MCNC: 14.7 G/DL (ref 13–17.7)
IMM GRANULOCYTES # BLD AUTO: 0.12 10*3/MM3 (ref 0–0.05)
IMM GRANULOCYTES NFR BLD AUTO: 1.5 % (ref 0–0.5)
LYMPHOCYTES # BLD AUTO: 1.2 10*3/MM3 (ref 0.7–3.1)
LYMPHOCYTES NFR BLD AUTO: 15.2 % (ref 19.6–45.3)
MCH RBC QN AUTO: 32.2 PG (ref 26.6–33)
MCHC RBC AUTO-ENTMCNC: 32.8 G/DL (ref 31.5–35.7)
MCV RBC AUTO: 98 FL (ref 79–97)
MONOCYTES # BLD AUTO: 0.58 10*3/MM3 (ref 0.1–0.9)
MONOCYTES NFR BLD AUTO: 7.3 % (ref 5–12)
NEUTROPHILS # BLD AUTO: 5.95 10*3/MM3 (ref 1.7–7)
NEUTROPHILS NFR BLD AUTO: 75.3 % (ref 42.7–76)
NRBC BLD AUTO-RTO: 0 /100 WBC (ref 0–0.2)
PLATELET # BLD AUTO: 158 10*3/MM3 (ref 140–450)
PMV BLD AUTO: 9.6 FL (ref 6–12)
POTASSIUM BLD-SCNC: 4.3 MMOL/L (ref 3.5–5.2)
PROT SERPL-MCNC: 6.7 G/DL (ref 6–8.5)
RBC # BLD AUTO: 4.57 10*6/MM3 (ref 4.14–5.8)
SODIUM BLD-SCNC: 135 MMOL/L (ref 136–145)
WBC NRBC COR # BLD: 7.91 10*3/MM3 (ref 3.4–10.8)

## 2019-11-26 PROCEDURE — 96372 THER/PROPH/DIAG INJ SC/IM: CPT

## 2019-11-26 PROCEDURE — 80053 COMPREHEN METABOLIC PANEL: CPT | Performed by: INTERNAL MEDICINE

## 2019-11-26 PROCEDURE — 85025 COMPLETE CBC W/AUTO DIFF WBC: CPT | Performed by: INTERNAL MEDICINE

## 2019-11-26 PROCEDURE — 25010000002 ENOXAPARIN PER 10 MG: Performed by: INTERNAL MEDICINE

## 2019-11-26 PROCEDURE — 97162 PT EVAL MOD COMPLEX 30 MIN: CPT

## 2019-11-26 PROCEDURE — 96361 HYDRATE IV INFUSION ADD-ON: CPT

## 2019-11-26 PROCEDURE — 99219 PR INITIAL OBSERVATION CARE/DAY 50 MINUTES: CPT | Performed by: INTERNAL MEDICINE

## 2019-11-26 PROCEDURE — G0378 HOSPITAL OBSERVATION PER HR: HCPCS

## 2019-11-26 PROCEDURE — 97165 OT EVAL LOW COMPLEX 30 MIN: CPT

## 2019-11-26 RX ORDER — ACETAMINOPHEN 325 MG/1
650 TABLET ORAL EVERY 4 HOURS PRN
Status: DISCONTINUED | OUTPATIENT
Start: 2019-11-26 | End: 2019-11-28 | Stop reason: HOSPADM

## 2019-11-26 RX ORDER — ONDANSETRON 4 MG/1
4 TABLET, FILM COATED ORAL EVERY 6 HOURS PRN
Status: DISCONTINUED | OUTPATIENT
Start: 2019-11-26 | End: 2019-11-28 | Stop reason: HOSPADM

## 2019-11-26 RX ORDER — FINASTERIDE 5 MG/1
5 TABLET, FILM COATED ORAL DAILY
Status: DISCONTINUED | OUTPATIENT
Start: 2019-11-26 | End: 2019-11-28 | Stop reason: HOSPADM

## 2019-11-26 RX ORDER — CHOLECALCIFEROL (VITAMIN D3) 125 MCG
5 CAPSULE ORAL NIGHTLY PRN
Status: DISCONTINUED | OUTPATIENT
Start: 2019-11-26 | End: 2019-11-28 | Stop reason: HOSPADM

## 2019-11-26 RX ORDER — ALUMINA, MAGNESIA, AND SIMETHICONE 2400; 2400; 240 MG/30ML; MG/30ML; MG/30ML
15 SUSPENSION ORAL EVERY 6 HOURS PRN
Status: DISCONTINUED | OUTPATIENT
Start: 2019-11-26 | End: 2019-11-28 | Stop reason: HOSPADM

## 2019-11-26 RX ORDER — LEVOTHYROXINE SODIUM 88 UG/1
88 TABLET ORAL DAILY
Status: DISCONTINUED | OUTPATIENT
Start: 2019-11-26 | End: 2019-11-28 | Stop reason: HOSPADM

## 2019-11-26 RX ORDER — NITROGLYCERIN 0.4 MG/1
0.4 TABLET SUBLINGUAL
Status: DISCONTINUED | OUTPATIENT
Start: 2019-11-26 | End: 2019-11-28 | Stop reason: HOSPADM

## 2019-11-26 RX ORDER — SODIUM CHLORIDE 9 MG/ML
75 INJECTION, SOLUTION INTRAVENOUS CONTINUOUS
Status: ACTIVE | OUTPATIENT
Start: 2019-11-26 | End: 2019-11-26

## 2019-11-26 RX ORDER — LISINOPRIL 5 MG/1
2.5 TABLET ORAL DAILY
Status: DISCONTINUED | OUTPATIENT
Start: 2019-11-26 | End: 2019-11-28 | Stop reason: HOSPADM

## 2019-11-26 RX ORDER — SODIUM CHLORIDE 0.9 % (FLUSH) 0.9 %
10 SYRINGE (ML) INJECTION EVERY 12 HOURS SCHEDULED
Status: DISCONTINUED | OUTPATIENT
Start: 2019-11-26 | End: 2019-11-28 | Stop reason: HOSPADM

## 2019-11-26 RX ORDER — ONDANSETRON 2 MG/ML
4 INJECTION INTRAMUSCULAR; INTRAVENOUS EVERY 6 HOURS PRN
Status: DISCONTINUED | OUTPATIENT
Start: 2019-11-26 | End: 2019-11-28 | Stop reason: HOSPADM

## 2019-11-26 RX ORDER — SODIUM CHLORIDE 0.9 % (FLUSH) 0.9 %
10 SYRINGE (ML) INJECTION AS NEEDED
Status: DISCONTINUED | OUTPATIENT
Start: 2019-11-26 | End: 2019-11-28 | Stop reason: HOSPADM

## 2019-11-26 RX ORDER — ACETAMINOPHEN 650 MG/1
650 SUPPOSITORY RECTAL EVERY 4 HOURS PRN
Status: DISCONTINUED | OUTPATIENT
Start: 2019-11-26 | End: 2019-11-28 | Stop reason: HOSPADM

## 2019-11-26 RX ORDER — ACETAMINOPHEN 160 MG/5ML
650 SOLUTION ORAL EVERY 4 HOURS PRN
Status: DISCONTINUED | OUTPATIENT
Start: 2019-11-26 | End: 2019-11-28 | Stop reason: HOSPADM

## 2019-11-26 RX ADMIN — ENOXAPARIN SODIUM 40 MG: 40 INJECTION SUBCUTANEOUS at 06:01

## 2019-11-26 RX ADMIN — LISINOPRIL 2.5 MG: 5 TABLET ORAL at 08:30

## 2019-11-26 RX ADMIN — FINASTERIDE 5 MG: 5 TABLET, FILM COATED ORAL at 08:30

## 2019-11-26 RX ADMIN — ACETAMINOPHEN 650 MG: 325 TABLET, FILM COATED ORAL at 08:31

## 2019-11-26 RX ADMIN — SODIUM CHLORIDE, PRESERVATIVE FREE 10 ML: 5 INJECTION INTRAVENOUS at 20:11

## 2019-11-26 RX ADMIN — SODIUM CHLORIDE, PRESERVATIVE FREE 10 ML: 5 INJECTION INTRAVENOUS at 08:31

## 2019-11-26 RX ADMIN — LEVOTHYROXINE SODIUM 88 MCG: 88 TABLET ORAL at 06:01

## 2019-11-26 RX ADMIN — SODIUM CHLORIDE 75 ML/HR: 9 INJECTION, SOLUTION INTRAVENOUS at 01:02

## 2019-11-27 LAB
ANION GAP SERPL CALCULATED.3IONS-SCNC: 12.2 MMOL/L (ref 5–15)
BUN BLD-MCNC: 13 MG/DL (ref 8–23)
BUN/CREAT SERPL: 11.4 (ref 7–25)
CALCIUM SPEC-SCNC: 7.9 MG/DL (ref 8.6–10.5)
CHLORIDE SERPL-SCNC: 100 MMOL/L (ref 98–107)
CO2 SERPL-SCNC: 21.8 MMOL/L (ref 22–29)
CREAT BLD-MCNC: 1.14 MG/DL (ref 0.76–1.27)
GFR SERPL CREATININE-BSD FRML MDRD: 62 ML/MIN/1.73
GLUCOSE BLD-MCNC: 119 MG/DL (ref 65–99)
POTASSIUM BLD-SCNC: 3.7 MMOL/L (ref 3.5–5.2)
SODIUM BLD-SCNC: 134 MMOL/L (ref 136–145)

## 2019-11-27 PROCEDURE — 97116 GAIT TRAINING THERAPY: CPT

## 2019-11-27 PROCEDURE — G0378 HOSPITAL OBSERVATION PER HR: HCPCS

## 2019-11-27 PROCEDURE — 97535 SELF CARE MNGMENT TRAINING: CPT

## 2019-11-27 PROCEDURE — 80048 BASIC METABOLIC PNL TOTAL CA: CPT | Performed by: INTERNAL MEDICINE

## 2019-11-27 PROCEDURE — 96372 THER/PROPH/DIAG INJ SC/IM: CPT

## 2019-11-27 PROCEDURE — 99225 PR SBSQ OBSERVATION CARE/DAY 25 MINUTES: CPT | Performed by: INTERNAL MEDICINE

## 2019-11-27 PROCEDURE — 87040 BLOOD CULTURE FOR BACTERIA: CPT | Performed by: INTERNAL MEDICINE

## 2019-11-27 PROCEDURE — 97530 THERAPEUTIC ACTIVITIES: CPT

## 2019-11-27 PROCEDURE — 25010000002 ENOXAPARIN PER 10 MG: Performed by: INTERNAL MEDICINE

## 2019-11-27 RX ADMIN — LISINOPRIL 2.5 MG: 5 TABLET ORAL at 09:25

## 2019-11-27 RX ADMIN — SODIUM CHLORIDE, PRESERVATIVE FREE 10 ML: 5 INJECTION INTRAVENOUS at 20:30

## 2019-11-27 RX ADMIN — ENOXAPARIN SODIUM 40 MG: 40 INJECTION SUBCUTANEOUS at 05:01

## 2019-11-27 RX ADMIN — LEVOTHYROXINE SODIUM 88 MCG: 88 TABLET ORAL at 05:01

## 2019-11-27 RX ADMIN — SODIUM CHLORIDE, PRESERVATIVE FREE 10 ML: 5 INJECTION INTRAVENOUS at 09:27

## 2019-11-27 RX ADMIN — FINASTERIDE 5 MG: 5 TABLET, FILM COATED ORAL at 09:25

## 2019-11-27 RX ADMIN — ACETAMINOPHEN 650 MG: 325 TABLET, FILM COATED ORAL at 05:01

## 2019-11-28 VITALS
WEIGHT: 194.6 LBS | SYSTOLIC BLOOD PRESSURE: 107 MMHG | RESPIRATION RATE: 16 BRPM | HEART RATE: 83 BPM | DIASTOLIC BLOOD PRESSURE: 69 MMHG | BODY MASS INDEX: 28.82 KG/M2 | OXYGEN SATURATION: 98 % | HEIGHT: 69 IN | TEMPERATURE: 98.1 F

## 2019-11-28 PROCEDURE — G0378 HOSPITAL OBSERVATION PER HR: HCPCS

## 2019-11-28 PROCEDURE — 96372 THER/PROPH/DIAG INJ SC/IM: CPT

## 2019-11-28 PROCEDURE — 99217 PR OBSERVATION CARE DISCHARGE MANAGEMENT: CPT | Performed by: INTERNAL MEDICINE

## 2019-11-28 PROCEDURE — 25010000002 ENOXAPARIN PER 10 MG: Performed by: INTERNAL MEDICINE

## 2019-11-28 RX ORDER — ONDANSETRON 4 MG/1
4 TABLET, FILM COATED ORAL EVERY 6 HOURS PRN
Qty: 12 TABLET | Refills: 0 | Status: SHIPPED | OUTPATIENT
Start: 2019-11-28 | End: 2022-06-21 | Stop reason: HOSPADM

## 2019-11-28 RX ADMIN — SODIUM CHLORIDE, PRESERVATIVE FREE 10 ML: 5 INJECTION INTRAVENOUS at 09:10

## 2019-11-28 RX ADMIN — LISINOPRIL 2.5 MG: 5 TABLET ORAL at 09:05

## 2019-11-28 RX ADMIN — ENOXAPARIN SODIUM 40 MG: 40 INJECTION SUBCUTANEOUS at 05:23

## 2019-11-28 RX ADMIN — ACETAMINOPHEN 650 MG: 325 TABLET, FILM COATED ORAL at 06:44

## 2019-11-28 RX ADMIN — FINASTERIDE 5 MG: 5 TABLET, FILM COATED ORAL at 09:05

## 2019-11-28 RX ADMIN — LEVOTHYROXINE SODIUM 88 MCG: 88 TABLET ORAL at 05:23

## 2019-11-30 LAB
BACTERIA SPEC AEROBE CULT: NORMAL
BACTERIA SPEC AEROBE CULT: NORMAL

## 2019-12-02 LAB
BACTERIA SPEC AEROBE CULT: NORMAL
BACTERIA SPEC AEROBE CULT: NORMAL

## 2019-12-10 ENCOUNTER — OUTSIDE FACILITY SERVICE (OUTPATIENT)
Dept: HOSPITALIST | Facility: HOSPITAL | Age: 84
End: 2019-12-10

## 2019-12-10 PROCEDURE — G0180 MD CERTIFICATION HHA PATIENT: HCPCS | Performed by: INTERNAL MEDICINE

## 2020-01-21 ENCOUNTER — LAB (OUTPATIENT)
Dept: LAB | Facility: HOSPITAL | Age: 85
End: 2020-01-21

## 2020-01-21 ENCOUNTER — TRANSCRIBE ORDERS (OUTPATIENT)
Dept: LAB | Facility: HOSPITAL | Age: 85
End: 2020-01-21

## 2020-01-21 ENCOUNTER — HOSPITAL ENCOUNTER (OUTPATIENT)
Dept: GENERAL RADIOLOGY | Facility: HOSPITAL | Age: 85
Discharge: HOME | End: 2020-01-21
Admitting: UROLOGY

## 2020-01-21 DIAGNOSIS — N40.1 ENLARGED PROSTATE WITH URINARY OBSTRUCTION: ICD-10-CM

## 2020-01-21 DIAGNOSIS — N13.8 ENLARGED PROSTATE WITH URINARY OBSTRUCTION: ICD-10-CM

## 2020-01-21 DIAGNOSIS — N13.8 ENLARGED PROSTATE WITH URINARY OBSTRUCTION: Primary | ICD-10-CM

## 2020-01-21 DIAGNOSIS — N40.1 ENLARGED PROSTATE WITH URINARY OBSTRUCTION: Primary | ICD-10-CM

## 2020-01-21 LAB
ALBUMIN SERPL-MCNC: 4.3 G/DL (ref 3.5–5.2)
ALBUMIN/GLOB SERPL: 1.4 G/DL
ALP SERPL-CCNC: 68 U/L (ref 39–117)
ALT SERPL W P-5'-P-CCNC: 17 U/L (ref 1–41)
ANION GAP SERPL CALCULATED.3IONS-SCNC: 15.6 MMOL/L (ref 5–15)
AST SERPL-CCNC: 15 U/L (ref 1–40)
BILIRUB SERPL-MCNC: 0.4 MG/DL (ref 0.2–1.2)
BUN BLD-MCNC: 9 MG/DL (ref 8–23)
BUN/CREAT SERPL: 8.8 (ref 7–25)
CALCIUM SPEC-SCNC: 9.1 MG/DL (ref 8.6–10.5)
CHLORIDE SERPL-SCNC: 97 MMOL/L (ref 98–107)
CO2 SERPL-SCNC: 21.4 MMOL/L (ref 22–29)
CREAT BLD-MCNC: 1.02 MG/DL (ref 0.76–1.27)
GFR SERPL CREATININE-BSD FRML MDRD: 70 ML/MIN/1.73
GLOBULIN UR ELPH-MCNC: 3 GM/DL
GLUCOSE BLD-MCNC: 122 MG/DL (ref 65–99)
POTASSIUM BLD-SCNC: 4 MMOL/L (ref 3.5–5.2)
PROT SERPL-MCNC: 7.3 G/DL (ref 6–8.5)
PSA SERPL-MCNC: 0.28 NG/ML (ref 0–4)
SODIUM BLD-SCNC: 134 MMOL/L (ref 136–145)

## 2020-01-21 PROCEDURE — 80053 COMPREHEN METABOLIC PANEL: CPT

## 2020-01-21 PROCEDURE — 74018 RADEX ABDOMEN 1 VIEW: CPT

## 2020-01-21 PROCEDURE — G0103 PSA SCREENING: HCPCS

## 2020-01-21 PROCEDURE — 36415 COLL VENOUS BLD VENIPUNCTURE: CPT

## 2020-05-29 ENCOUNTER — LAB (OUTPATIENT)
Dept: LAB | Facility: HOSPITAL | Age: 85
End: 2020-05-29

## 2020-05-29 ENCOUNTER — TRANSCRIBE ORDERS (OUTPATIENT)
Dept: LAB | Facility: HOSPITAL | Age: 85
End: 2020-05-29

## 2020-05-29 ENCOUNTER — HOSPITAL ENCOUNTER (OUTPATIENT)
Dept: GENERAL RADIOLOGY | Facility: HOSPITAL | Age: 85
Discharge: HOME | End: 2020-05-29
Admitting: UROLOGY

## 2020-05-29 DIAGNOSIS — N13.8 ENLARGED PROSTATE WITH URINARY OBSTRUCTION: Primary | ICD-10-CM

## 2020-05-29 DIAGNOSIS — N40.1 ENLARGED PROSTATE WITH URINARY OBSTRUCTION: ICD-10-CM

## 2020-05-29 DIAGNOSIS — N40.1 ENLARGED PROSTATE WITH URINARY OBSTRUCTION: Primary | ICD-10-CM

## 2020-05-29 DIAGNOSIS — K59.00 CONSTIPATION, UNSPECIFIED CONSTIPATION TYPE: ICD-10-CM

## 2020-05-29 DIAGNOSIS — N13.8 ENLARGED PROSTATE WITH URINARY OBSTRUCTION: ICD-10-CM

## 2020-05-29 LAB
ALBUMIN SERPL-MCNC: 4.2 G/DL (ref 3.5–5.2)
ALBUMIN/GLOB SERPL: 1.4 G/DL
ALP SERPL-CCNC: 61 U/L (ref 39–117)
ALT SERPL W P-5'-P-CCNC: 19 U/L (ref 1–41)
ANION GAP SERPL CALCULATED.3IONS-SCNC: 13.5 MMOL/L (ref 5–15)
AST SERPL-CCNC: 15 U/L (ref 1–40)
BILIRUB SERPL-MCNC: 0.6 MG/DL (ref 0.2–1.2)
BUN BLD-MCNC: 10 MG/DL (ref 8–23)
BUN/CREAT SERPL: 8.2 (ref 7–25)
CALCIUM SPEC-SCNC: 9 MG/DL (ref 8.6–10.5)
CHLORIDE SERPL-SCNC: 102 MMOL/L (ref 98–107)
CO2 SERPL-SCNC: 23.5 MMOL/L (ref 22–29)
CREAT BLD-MCNC: 1.22 MG/DL (ref 0.76–1.27)
GFR SERPL CREATININE-BSD FRML MDRD: 57 ML/MIN/1.73
GLOBULIN UR ELPH-MCNC: 3.1 GM/DL
GLUCOSE BLD-MCNC: 114 MG/DL (ref 65–99)
POTASSIUM BLD-SCNC: 4.2 MMOL/L (ref 3.5–5.2)
PROT SERPL-MCNC: 7.3 G/DL (ref 6–8.5)
SODIUM BLD-SCNC: 139 MMOL/L (ref 136–145)

## 2020-05-29 PROCEDURE — 36415 COLL VENOUS BLD VENIPUNCTURE: CPT

## 2020-05-29 PROCEDURE — 80053 COMPREHEN METABOLIC PANEL: CPT

## 2020-05-29 PROCEDURE — 74018 RADEX ABDOMEN 1 VIEW: CPT

## 2020-10-12 ENCOUNTER — HOSPITAL ENCOUNTER (OUTPATIENT)
Dept: GENERAL RADIOLOGY | Facility: HOSPITAL | Age: 85
Discharge: HOME | End: 2020-10-12

## 2020-10-12 ENCOUNTER — TRANSCRIBE ORDERS (OUTPATIENT)
Dept: LAB | Facility: HOSPITAL | Age: 85
End: 2020-10-12

## 2020-10-12 ENCOUNTER — LAB (OUTPATIENT)
Dept: LAB | Facility: HOSPITAL | Age: 85
End: 2020-10-12

## 2020-10-12 DIAGNOSIS — N13.8 ENLARGED PROSTATE WITH URINARY OBSTRUCTION: ICD-10-CM

## 2020-10-12 DIAGNOSIS — N40.1 ENLARGED PROSTATE WITH URINARY OBSTRUCTION: ICD-10-CM

## 2020-10-12 DIAGNOSIS — N40.1 ENLARGED PROSTATE WITH URINARY OBSTRUCTION: Primary | ICD-10-CM

## 2020-10-12 DIAGNOSIS — N13.8 ENLARGED PROSTATE WITH URINARY OBSTRUCTION: Primary | ICD-10-CM

## 2020-10-12 LAB
ALBUMIN SERPL-MCNC: 4 G/DL (ref 3.5–5.2)
ALBUMIN/GLOB SERPL: 1.4 G/DL
ALP SERPL-CCNC: 65 U/L (ref 39–117)
ALT SERPL W P-5'-P-CCNC: 17 U/L (ref 1–41)
ANION GAP SERPL CALCULATED.3IONS-SCNC: 8.6 MMOL/L (ref 5–15)
AST SERPL-CCNC: 17 U/L (ref 1–40)
BILIRUB SERPL-MCNC: 0.6 MG/DL (ref 0–1.2)
BUN SERPL-MCNC: 12 MG/DL (ref 8–23)
BUN/CREAT SERPL: 10.9 (ref 7–25)
CALCIUM SPEC-SCNC: 8.7 MG/DL (ref 8.6–10.5)
CHLORIDE SERPL-SCNC: 103 MMOL/L (ref 98–107)
CO2 SERPL-SCNC: 25.4 MMOL/L (ref 22–29)
CREAT SERPL-MCNC: 1.1 MG/DL (ref 0.76–1.27)
GFR SERPL CREATININE-BSD FRML MDRD: 64 ML/MIN/1.73
GLOBULIN UR ELPH-MCNC: 2.9 GM/DL
GLUCOSE SERPL-MCNC: 138 MG/DL (ref 65–99)
POTASSIUM SERPL-SCNC: 4.1 MMOL/L (ref 3.5–5.2)
PROT SERPL-MCNC: 6.9 G/DL (ref 6–8.5)
PSA SERPL-MCNC: 0.32 NG/ML (ref 0–4)
SODIUM SERPL-SCNC: 137 MMOL/L (ref 136–145)

## 2020-10-12 PROCEDURE — G0103 PSA SCREENING: HCPCS

## 2020-10-12 PROCEDURE — 80053 COMPREHEN METABOLIC PANEL: CPT

## 2020-10-12 PROCEDURE — 74018 RADEX ABDOMEN 1 VIEW: CPT

## 2020-10-12 PROCEDURE — 36415 COLL VENOUS BLD VENIPUNCTURE: CPT

## 2020-10-12 PROCEDURE — 84153 ASSAY OF PSA TOTAL: CPT

## 2020-10-28 ENCOUNTER — TRANSCRIBE ORDERS (OUTPATIENT)
Dept: GENERAL RADIOLOGY | Facility: HOSPITAL | Age: 85
End: 2020-10-28

## 2020-10-28 ENCOUNTER — HOSPITAL ENCOUNTER (OUTPATIENT)
Dept: GENERAL RADIOLOGY | Facility: HOSPITAL | Age: 85
Discharge: HOME | End: 2020-10-28
Admitting: UROLOGY

## 2020-10-28 DIAGNOSIS — N20.1 CALCULUS OF URETER: ICD-10-CM

## 2020-10-28 DIAGNOSIS — N20.1 CALCULUS OF URETER: Primary | ICD-10-CM

## 2020-10-28 PROCEDURE — 74018 RADEX ABDOMEN 1 VIEW: CPT

## 2020-11-30 ENCOUNTER — TRANSCRIBE ORDERS (OUTPATIENT)
Dept: ADMINISTRATIVE | Facility: HOSPITAL | Age: 85
End: 2020-11-30

## 2021-06-14 ENCOUNTER — PREP FOR SURGERY (OUTPATIENT)
Dept: OTHER | Facility: HOSPITAL | Age: 86
End: 2021-06-14

## 2021-06-14 DIAGNOSIS — H25.11 NUCLEAR SCLEROTIC CATARACT OF RIGHT EYE: Primary | ICD-10-CM

## 2021-06-14 RX ORDER — PREDNISOLONE ACETATE 10 MG/ML
1 SUSPENSION/ DROPS OPHTHALMIC SEE ADMIN INSTRUCTIONS
Status: CANCELLED | OUTPATIENT
Start: 2021-06-14

## 2021-06-14 RX ORDER — SODIUM CHLORIDE 0.9 % (FLUSH) 0.9 %
3 SYRINGE (ML) INJECTION EVERY 12 HOURS SCHEDULED
Status: CANCELLED | OUTPATIENT
Start: 2021-06-14

## 2021-06-14 RX ORDER — TETRACAINE HYDROCHLORIDE 5 MG/ML
1 SOLUTION OPHTHALMIC SEE ADMIN INSTRUCTIONS
Status: CANCELLED | OUTPATIENT
Start: 2021-06-14

## 2021-06-14 RX ORDER — SODIUM CHLORIDE 0.9 % (FLUSH) 0.9 %
1-10 SYRINGE (ML) INJECTION AS NEEDED
Status: CANCELLED | OUTPATIENT
Start: 2021-06-14

## 2021-06-14 RX ORDER — CYCLOPENT/TROPIC/PHEN/KETR/WAT 1%-1%-2.5%
1 DROPS (EA) OPHTHALMIC (EYE)
Status: CANCELLED | OUTPATIENT
Start: 2021-06-14 | End: 2021-06-14

## 2021-06-21 ENCOUNTER — ANESTHESIA EVENT (OUTPATIENT)
Dept: PERIOP | Facility: HOSPITAL | Age: 86
End: 2021-06-21

## 2021-06-21 ENCOUNTER — ANESTHESIA (OUTPATIENT)
Dept: PERIOP | Facility: HOSPITAL | Age: 86
End: 2021-06-21

## 2021-06-21 ENCOUNTER — HOSPITAL ENCOUNTER (OUTPATIENT)
Facility: HOSPITAL | Age: 86
Setting detail: HOSPITAL OUTPATIENT SURGERY
Discharge: HOME | End: 2021-06-21
Attending: OPHTHALMOLOGY | Admitting: OPHTHALMOLOGY

## 2021-06-21 VITALS
SYSTOLIC BLOOD PRESSURE: 127 MMHG | DIASTOLIC BLOOD PRESSURE: 73 MMHG | HEART RATE: 64 BPM | OXYGEN SATURATION: 94 % | TEMPERATURE: 97.8 F | BODY MASS INDEX: 28.04 KG/M2 | HEIGHT: 68 IN | WEIGHT: 185 LBS | RESPIRATION RATE: 16 BRPM

## 2021-06-21 DIAGNOSIS — H25.11 NUCLEAR SCLEROTIC CATARACT OF RIGHT EYE: ICD-10-CM

## 2021-06-21 PROCEDURE — V2632 POST CHMBR INTRAOCULAR LENS: HCPCS | Performed by: OPHTHALMOLOGY

## 2021-06-21 PROCEDURE — 25010000002 PROPOFOL 200 MG/20ML EMULSION: Performed by: NURSE ANESTHETIST, CERTIFIED REGISTERED

## 2021-06-21 DEVICE — LENS ACRYSOF IQ SA60WF W/ULTRASERT 6X13MM ACU0T0 20.5: Type: IMPLANTABLE DEVICE | Site: POSTERIOR CHAMBER | Status: FUNCTIONAL

## 2021-06-21 RX ORDER — NEOMYCIN SULFATE, POLYMYXIN B SULFATE, AND DEXAMETHASONE 3.5; 10000; 1 MG/G; [USP'U]/G; MG/G
OINTMENT OPHTHALMIC AS NEEDED
Status: DISCONTINUED | OUTPATIENT
Start: 2021-06-21 | End: 2021-06-21 | Stop reason: HOSPADM

## 2021-06-21 RX ORDER — SODIUM CHLORIDE, SODIUM LACTATE, POTASSIUM CHLORIDE, CALCIUM CHLORIDE 600; 310; 30; 20 MG/100ML; MG/100ML; MG/100ML; MG/100ML
25 INJECTION, SOLUTION INTRAVENOUS CONTINUOUS
Status: DISCONTINUED | OUTPATIENT
Start: 2021-06-21 | End: 2021-06-21 | Stop reason: HOSPADM

## 2021-06-21 RX ORDER — PREDNISOLONE ACETATE 10 MG/ML
1 SUSPENSION/ DROPS OPHTHALMIC SEE ADMIN INSTRUCTIONS
Status: DISCONTINUED | OUTPATIENT
Start: 2021-06-21 | End: 2021-06-21 | Stop reason: HOSPADM

## 2021-06-21 RX ORDER — BALANCED SALT SOLUTION 6.4; .75; .48; .3; 3.9; 1.7 MG/ML; MG/ML; MG/ML; MG/ML; MG/ML; MG/ML
SOLUTION OPHTHALMIC AS NEEDED
Status: DISCONTINUED | OUTPATIENT
Start: 2021-06-21 | End: 2021-06-21 | Stop reason: HOSPADM

## 2021-06-21 RX ORDER — LIDOCAINE HYDROCHLORIDE 40 MG/ML
INJECTION, SOLUTION RETROBULBAR; TOPICAL AS NEEDED
Status: DISCONTINUED | OUTPATIENT
Start: 2021-06-21 | End: 2021-06-21 | Stop reason: HOSPADM

## 2021-06-21 RX ORDER — LIDOCAINE HCL/PF 100 MG/5ML
SYRINGE (ML) INJECTION AS NEEDED
Status: DISCONTINUED | OUTPATIENT
Start: 2021-06-21 | End: 2021-06-21 | Stop reason: SURG

## 2021-06-21 RX ORDER — PROPOFOL 10 MG/ML
INJECTION, EMULSION INTRAVENOUS AS NEEDED
Status: DISCONTINUED | OUTPATIENT
Start: 2021-06-21 | End: 2021-06-21 | Stop reason: SURG

## 2021-06-21 RX ORDER — TETRACAINE HYDROCHLORIDE 5 MG/ML
1 SOLUTION OPHTHALMIC SEE ADMIN INSTRUCTIONS
Status: COMPLETED | OUTPATIENT
Start: 2021-06-21 | End: 2021-06-21

## 2021-06-21 RX ORDER — CYCLOPENT/TROPIC/PHEN/KETR/WAT 1%-1%-2.5%
1 DROPS (EA) OPHTHALMIC (EYE)
Status: COMPLETED | OUTPATIENT
Start: 2021-06-21 | End: 2021-06-21

## 2021-06-21 RX ORDER — PREDNISOLONE ACETATE 10 MG/ML
SUSPENSION/ DROPS OPHTHALMIC AS NEEDED
Status: DISCONTINUED | OUTPATIENT
Start: 2021-06-21 | End: 2021-06-21 | Stop reason: HOSPADM

## 2021-06-21 RX ORDER — SODIUM CHLORIDE 0.9 % (FLUSH) 0.9 %
1-10 SYRINGE (ML) INJECTION AS NEEDED
Status: DISCONTINUED | OUTPATIENT
Start: 2021-06-21 | End: 2021-06-21 | Stop reason: HOSPADM

## 2021-06-21 RX ORDER — KETAMINE HCL IN NACL, ISO-OSM 100MG/10ML
SYRINGE (ML) INJECTION AS NEEDED
Status: DISCONTINUED | OUTPATIENT
Start: 2021-06-21 | End: 2021-06-21 | Stop reason: SURG

## 2021-06-21 RX ORDER — TETRACAINE HYDROCHLORIDE 5 MG/ML
SOLUTION OPHTHALMIC AS NEEDED
Status: DISCONTINUED | OUTPATIENT
Start: 2021-06-21 | End: 2021-06-21 | Stop reason: HOSPADM

## 2021-06-21 RX ORDER — ACETAZOLAMIDE 500 MG/1
500 CAPSULE, EXTENDED RELEASE ORAL ONCE
Status: COMPLETED | OUTPATIENT
Start: 2021-06-21 | End: 2021-06-21

## 2021-06-21 RX ORDER — PREDNISOLONE ACETATE 10 MG/ML
SUSPENSION/ DROPS OPHTHALMIC
Qty: 2 ML | Refills: 0
Start: 2021-06-21 | End: 2022-06-21 | Stop reason: HOSPADM

## 2021-06-21 RX ORDER — SODIUM CHLORIDE 0.9 % (FLUSH) 0.9 %
3 SYRINGE (ML) INJECTION EVERY 12 HOURS SCHEDULED
Status: DISCONTINUED | OUTPATIENT
Start: 2021-06-21 | End: 2021-06-21 | Stop reason: HOSPADM

## 2021-06-21 RX ADMIN — Medication 20 MG: at 11:58

## 2021-06-21 RX ADMIN — Medication 1 DROP: at 10:40

## 2021-06-21 RX ADMIN — Medication 1 DROP: at 10:35

## 2021-06-21 RX ADMIN — PROPOFOL 100 MG: 10 INJECTION, EMULSION INTRAVENOUS at 11:58

## 2021-06-21 RX ADMIN — TETRACAINE HYDROCHLORIDE 1 DROP: 5 SOLUTION OPHTHALMIC at 10:29

## 2021-06-21 RX ADMIN — ACETAZOLAMIDE 500 MG: 500 CAPSULE, EXTENDED RELEASE ORAL at 12:31

## 2021-06-21 RX ADMIN — Medication 100 MG: at 11:58

## 2021-06-21 RX ADMIN — PROPOFOL 50 MG: 10 INJECTION, EMULSION INTRAVENOUS at 12:08

## 2021-06-21 RX ADMIN — Medication 1 DROP: at 10:30

## 2021-06-21 RX ADMIN — SODIUM CHLORIDE, POTASSIUM CHLORIDE, SODIUM LACTATE AND CALCIUM CHLORIDE 25 ML/HR: 600; 310; 30; 20 INJECTION, SOLUTION INTRAVENOUS at 10:37

## 2021-06-21 RX ADMIN — TETRACAINE HYDROCHLORIDE 1 DROP: 5 SOLUTION OPHTHALMIC at 10:28

## 2021-06-21 NOTE — ANESTHESIA POSTPROCEDURE EVALUATION
Patient: Remington Rojas    Procedure Summary     Date: 06/21/21 Room / Location: [facility] OR 1 /  [facility] OR    Anesthesia Start: 1153 Anesthesia Stop: 1217    Procedure: CATARACT PHACO EXTRACTION WITH INTRAOCULAR LENS IMPLANT RIGHT COMLEX W/ MALYUGIN RING (Right Eye) Diagnosis:       Nuclear sclerotic cataract of right eye      (Nuclear sclerotic cataract of right eye [H25.11])    Surgeons: Td Palmer MD Provider: Candido Nichols CRNA    Anesthesia Type: MAC ASA Status: 3          Anesthesia Type: MAC    Vitals  Vitals Value Taken Time   /73 06/21/21 1238   Temp 97.8 °F (36.6 °C) 06/21/21 1221   Pulse 64 06/21/21 1238   Resp 16 06/21/21 1238   SpO2 94 % 06/21/21 1238           Post Anesthesia Care and Evaluation    Patient location during evaluation: bedside  Patient participation: complete - patient participated  Level of consciousness: awake and alert  Pain management: satisfactory to patient  Airway patency: patent  Anesthetic complications: No anesthetic complications    Cardiovascular status: acceptable and hemodynamically stable  Respiratory status: acceptable  Hydration status: acceptable  No anesthesia care post op

## 2021-06-21 NOTE — ANESTHESIA PREPROCEDURE EVALUATION
Anesthesia Evaluation     Patient summary reviewed and Nursing notes reviewed   NPO Solid Status: > 8 hours  NPO Liquid Status: > 8 hours           Airway   Mallampati: II  TM distance: >3 FB  Neck ROM: full  No difficulty expected  Dental      Pulmonary    Cardiovascular     (+) hypertension, past MI , CAD, CABG, hyperlipidemia,       Neuro/Psych  GI/Hepatic/Renal/Endo    (+)  GERD,      Musculoskeletal     Abdominal    Substance History      OB/GYN          Other   arthritis,                      Anesthesia Plan    ASA 3     MAC   (Risks and benefits discussed including risk of aspiration, recall and dental damage. All patient questions answered. Will continue with POC.)  intravenous induction     Anesthetic plan, all risks, benefits, and alternatives have been provided, discussed and informed consent has been obtained with: patient.    Plan discussed with CRNA.

## 2021-06-21 NOTE — H&P
"      [facility]         History and Physical    Patient Name: Remington Rojas  MRN: [id number]  : 1935  Gender: male     HPI: Patient complaint of PPLOV Right eye diagnosed with cataract. Patient requests PHACO PCIOL for Increase of VA/ADL.    History:    Past Medical History:   Diagnosis Date   • Acute myocardial infarction (CMS/HCC)     \"ABOUT 8-10 YEARS AGO\" PATIENT REPORTS   • BPH (benign prostatic hyperplasia)    • CAD (coronary artery disease)    • Elevated cholesterol     REPORTS HAS BEEN ON MEDICATION IN THE PAST   • GERD without esophagitis    • [place] (hard of hearing)    • Hyperlipidemia     UNSPECIFIED   • Hypertension    • Hypothyroidism    • Impaired functional mobility, balance, gait, and endurance    • Overactive bladder    • Seasonal allergies    • Wears dentures        Past Surgical History:   Procedure Laterality Date   • CARDIAC CATHETERIZATION      REPORTS \"ABOUT 8-10 YEARS AGO\" HAD 2 STENTS PLACED   • CORONARY ARTERY BYPASS GRAFT     • HERNIA REPAIR      BILATERAL INGUINAL HERNIA REPAIRS WITH MESH WITHIN 3 YEARS   • MOUTH SURGERY      FULL TEETH EXTRACTION   • ORIF WRIST FRACTURE Right 2018    Procedure: OPEN REDUCTION INTERNAL FIXATION WITH VOLAR PLATE (SYNTHES), RIGHT WRIST;  Surgeon: Sen Flanagan MD;  Location: [facility];  Service: Orthopedics   • OTHER SURGICAL HISTORY      INGUINAL HERNIA REPAIR BILATERAL   • OTHER SURGICAL HISTORY      PREVIOUS STENT PLACEMENT       Social History     Socioeconomic History   • Marital status:      Spouse name: Not on file   • Number of children: Not on file   • Years of education: Not on file   • Highest education level: Not on file   Tobacco Use   • Smoking status: Former Smoker   • Smokeless tobacco: Never Used   Substance and Sexual Activity   • Alcohol use: No   • Drug use: No   • Sexual activity: Defer       Family History   Problem Relation Age of Onset   • Coronary artery disease Mother    • " Coronary artery disease Sister        Prior to Admission Medications:  Medications Prior to Admission   Medication Sig Dispense Refill Last Dose   • finasteride (PROSCAR) 5 MG tablet Take 5 mg by mouth daily.   6/20/2021 at 0800   • levothyroxine (SYNTHROID, LEVOTHROID) 88 MCG tablet Take 88 mcg by mouth Daily.   6/20/2021 at 0800   • lisinopril (PRINIVIL,ZESTRIL) 2.5 MG tablet Take 2.5 mg by mouth Daily.   6/21/2021 at 0800   • nitroglycerin (NITROSTAT) 0.4 MG SL tablet Place 0.4 mg under the tongue Every 5 (Five) Minutes As Needed.   More than a month at Unknown time   • ondansetron (ZOFRAN) 4 MG tablet Take 1 tablet by mouth Every 6 (Six) Hours As Needed for Nausea or Vomiting. 12 tablet 0 More than a month at Unknown time       Allergies:  Allergies   Allergen Reactions   • Metoprolol Unknown (See Comments)     PATIENT DOES NOT RECALL ALLERGY REACTION OR TYPE, BUT DR BROWN (CARDIOTHORACIC SURGEON) TOLD HIM NOT TO TAKE IT.        Vitals: Temp:  [97.2 °F (36.2 °C)] 97.2 °F (36.2 °C)  Heart Rate:  [80] 80  Resp:  [16] 16  BP: (122)/(69) 122/69    Review of Systems:   Within Normal Limits Abnormal   HEENT [x]    []     Cardiovascular [x]   []     Gastrointestinal [x]   []     Genitourinary [x]   []     Neurologic [x]   []     Pulmonary [x]   []       Physical Exam:   Within Normal Limits Abnormal   HEENT [x]    []     Heart [x]   []     Lungs [x]   []     Abdomen [x]   []     Extremities [x]   []       Impression: Right nuclear sclerotic cataract.     Plan: CATARACT PHACO EXTRACTION WITH INTRAOCULAR LENS IMPLANT RIGHT (Right)     Td Palmer MD  6/21/2021

## 2021-06-21 NOTE — DISCHARGE INSTRUCTIONS
Post Operative Cataract Instructions     Right Eye  POST OPERATIVE INSTRUCTIONS  • You have received anesthesia today. DO NOT drive, drink alcohol, sign legal documents.   • After surgery, your eye will not hurt. It may feel scratchy, sticky or uncomfortable. Your eye will be sensitive to light.  • Most people see better 1-3 days after the procedure, but it could take 3 weeks to get the full benefits and reach your visual potential. If your vision is blurry for a few days it is normal, and means you may have swelling outside or inside the eye. For some patients, a bubble is placed and there will be blurriness.   • You should receive a post-op kit with tape and an eye shield. Wear the shield for the first 3 nights after surgery to keep you from rubbing the eye.  • You may wear glasses or sunglasses during the day.  You must keep eye protected at all times.  • Most people are able to return to their normal routine 1-3 days after surgery, however, due to the brain adjusting to your new vision you may have trouble judging distances and want to be careful when driving and going up and downstairs.   • You can shower and wash your hair the day after surgery. Keep water, shampoo, hair spray and shaving lotion out of the eye, especially for the first week.   • If eyes become stuck together after surgery you may soak with warm cloth.  • During the first week, you should AVOID:   1. Rubbing or putting pressure on your eye.  2. Eye make-up, face cream or lotion, hair coloring or perms  3. Strenuous activities, such as running or lifting weights, as to avoid sweat from getting in the eye. Avoid swimming, hot tubs, fumes or rakesh conditions.   4. Sleeping on operative side.  5. Excessive sneezing or coughing.  6. Hanging the head down for periods of time. Keep your head above your heart.    Some discomfort and blurred vision after surgery are normal, but if you have any unusual pain, swelling, bleeding or sudden decrease in  vision, contact our office immediately.     Emergency assistance is available at any time by calling:    Dr.Mark Estela Palmer  [phone]    If unable to reach call [facility] @ [phone]    You have been prescribed an eye drop to use after surgery, please follow these instructions and bring eye drops and instructions with you to post op appointment:    Prednisolone Acetate: Shake well before use    USE 1 DROP 4 (FOUR) TIMES A DAY FOR 1 WEEK THEN STARTING WEEK 2, ONLY USE 2 (TWO) TIMES A DAY UNTIL YOU RUN OUT OF DROPS.     PLACE A POLINA IN THE DAY COLUMN EACH TIME YOU USE TO KEEP ON SCHEDULE    WEEK 1-USE 4  (FOUR) TIMES A DAY DAY 1  []   []    []   []     DAY 2  []   []    []   []  DAY 3  []   []    []   []  DAY 4  []   []    []   []  DAY 5  []   []    []   []  DAY 6  []   []    []   []  DAY 7  []   []    []   []      WEEK 2-USE 2 (TWO)  TIMES A DAY DAY 1  []   []  DAY 2  []   []  DAY 3  []   []  DAY 4  []   []  DAY 5  []   []  DAY 6  []   []  DAY 7  []   []      WEEK 3-USE 2 (TWO) TIMES A DAY DAY 1  []   []  DAY 2  []   []  DAY 3  []   []  DAY 4  []   []  DAY 5  []   []  DAY 6  []   []  DAY 7  []   []      WEEK 4-USE 2 (TWO)TIMES A DAY DAY 1  []   []  DAY 2  []   []  DAY 3  []   []  DAY 4  []   []  DAY 5  []   []  DAY 6  []   []  DAY 7  []   []          Please follow all post op instructions and follow up appointment time from your physician's office included in your discharge packet.  .   No pushing, pulling, tugging,  heavy lifting, or strenuous activity.  No major decision making, driving, or drinking alcoholic beverages for 24 hours. ( due to the medications you have  received)  Always use good hand hygiene/washing techniques.  NO driving while taking pain medications.    * if you have an incision:  Check your incision area every day for signs of infection.   Check for:  * more redness, swelling, or pain  *more fluid or  blood  *warmth  *pus or bad smell  To assist you in voiding:  Drink plenty of fluids  Listen to running water while attempting to void.    If you are unable to urinate and you have an uncomfortable urge to void or it has been   6 hours since you were discharged, return to the Emergency Room

## 2021-06-21 NOTE — OP NOTE
OPERATIVE NOTE    Date of Procedure: 6/21/2021  Patient Name: Remington Rojas  Patient MRN: [id number]  YOB: 1935     Preoperative Diagnosis: Right nuclear sclerotic cataract.     Postoperative Diagnosis: Right nuclear sclerotic cataract.     Procedure Performed: Phacoemulsification with implantation of a  foldable posterior chamber intraocular lens, Right eye.     Surgeon: Td Palmer MD     Anesthesia:  Monitored Anesthesia Care (MAC)      Brief History and Indication: The patient presents with a history of past progressive loss of vision.  Patient was diagnosed with cataract and requests removal for increased ability to read and see.     Operation Description: The patient was taken to the OR and prepped and draped in the usual sterile ophthalmic fashion. A lid speculum was placed in the eye.  A #75 blade was then used to make a stab incision two o’clock hours from the intended temporal clear cornea groove. The anterior chamber was then inflated with a Viscoelastic. A metal microkeratome blade was then used to enter the anterior chamber at the temporal clear cornea site. A three level tunnel incision was made. A curvilinear capsulorrhexis was then performed with a bent cystotome needle and capsulorrhexis forceps.  BSS on a 30 gauge bent cannula was used to hydro-dissect, and hydro-delineate the lens. Good fluid waves and hydro-delineation were noted. Phacoemulsification was then used to remove nuclear material without complications. The residual cortical and lenticular material was then removed with irrigation and aspiration. Viscoelastics were then used to inflate the bag in a soft shell technique. A PCIOL was injected into the bag. Post-implantation, there were no rents or tears in the bag and the lens was noted to be stable and centered. The residual Viscoelastic was then removed with irrigation and aspiration.  The wound was checked and found to be without leaks. Therefore a Polydex  ointment and one drop of a Prednisilone eye drop was placed in the eye.     Implant Information:   Implant Name Type Inv. Item Serial No.  Lot No. LRB No. Used Action   LENS ACRYSOF IQ SA60WF W/ULTRASERT 6X13MM ACU0T0 20.5 - [id number] - [id number] Implant LENS ACRYSOF IQ SA60WF W/ULTRASERT 6X13MM ACU0T0 20.5 [id number] [employer]  Right 1 Implanted       Complications: None     [x]   Changed to complex procedure due to: []  hypermature, white cataract. Blue dye was used. [x]   small iris. A Malyugin Ring was used.    Discharge and Condition  The patient was transported to same day surgery in excellent condition and scheduled for follow-up tomorrow morning. The patient was given instructions on use of eye drops for the operative eye and was specifically instructed to call Dr. Palmer at his office or home for any nausea, vomiting, headache, decreased visual acuity, or pain, or if the patient had any general concerns.    Td Palmer MD  6/21/2021

## 2021-07-13 ENCOUNTER — PREP FOR SURGERY (OUTPATIENT)
Dept: OTHER | Facility: HOSPITAL | Age: 86
End: 2021-07-13

## 2021-07-13 DIAGNOSIS — H25.12 NUCLEAR SCLEROTIC CATARACT OF LEFT EYE: Primary | ICD-10-CM

## 2021-07-13 RX ORDER — PREDNISOLONE ACETATE 10 MG/ML
1 SUSPENSION/ DROPS OPHTHALMIC SEE ADMIN INSTRUCTIONS
Status: CANCELLED | OUTPATIENT
Start: 2021-07-19

## 2021-07-13 RX ORDER — CYCLOPENT/TROPIC/PHEN/KETR/WAT 1%-1%-2.5%
1 DROPS (EA) OPHTHALMIC (EYE)
Status: CANCELLED | OUTPATIENT
Start: 2021-07-19 | End: 2021-07-19

## 2021-07-13 RX ORDER — TETRACAINE HYDROCHLORIDE 5 MG/ML
1 SOLUTION OPHTHALMIC SEE ADMIN INSTRUCTIONS
Status: CANCELLED | OUTPATIENT
Start: 2021-07-19

## 2021-07-13 RX ORDER — SODIUM CHLORIDE 0.9 % (FLUSH) 0.9 %
1-10 SYRINGE (ML) INJECTION AS NEEDED
Status: CANCELLED | OUTPATIENT
Start: 2021-07-13

## 2021-07-13 RX ORDER — SODIUM CHLORIDE 0.9 % (FLUSH) 0.9 %
10 SYRINGE (ML) INJECTION EVERY 12 HOURS SCHEDULED
Status: CANCELLED | OUTPATIENT
Start: 2021-07-19

## 2021-07-14 PROBLEM — H25.12 NUCLEAR SCLEROTIC CATARACT OF LEFT EYE: Status: ACTIVE | Noted: 2021-07-14

## 2021-07-19 ENCOUNTER — HOSPITAL ENCOUNTER (OUTPATIENT)
Facility: HOSPITAL | Age: 86
Setting detail: HOSPITAL OUTPATIENT SURGERY
Discharge: HOME | End: 2021-07-19
Attending: OPHTHALMOLOGY | Admitting: OPHTHALMOLOGY

## 2021-07-19 ENCOUNTER — ANESTHESIA EVENT (OUTPATIENT)
Dept: PERIOP | Facility: HOSPITAL | Age: 86
End: 2021-07-19

## 2021-07-19 ENCOUNTER — ANESTHESIA (OUTPATIENT)
Dept: PERIOP | Facility: HOSPITAL | Age: 86
End: 2021-07-19

## 2021-07-19 VITALS
TEMPERATURE: 97.2 F | SYSTOLIC BLOOD PRESSURE: 100 MMHG | HEIGHT: 68 IN | OXYGEN SATURATION: 97 % | BODY MASS INDEX: 28.04 KG/M2 | WEIGHT: 185 LBS | HEART RATE: 66 BPM | DIASTOLIC BLOOD PRESSURE: 60 MMHG | RESPIRATION RATE: 16 BRPM

## 2021-07-19 DIAGNOSIS — H25.12 NUCLEAR SCLEROTIC CATARACT OF LEFT EYE: ICD-10-CM

## 2021-07-19 PROCEDURE — 25010000002 PROPOFOL 10 MG/ML EMULSION: Performed by: NURSE ANESTHETIST, CERTIFIED REGISTERED

## 2021-07-19 PROCEDURE — V2632 POST CHMBR INTRAOCULAR LENS: HCPCS | Performed by: OPHTHALMOLOGY

## 2021-07-19 DEVICE — LENS ACRYSOF IQ SA60WF W/ULTRASERT 6X13MM ACU0T0 20: Type: IMPLANTABLE DEVICE | Site: POSTERIOR CHAMBER | Status: FUNCTIONAL

## 2021-07-19 RX ORDER — SODIUM CHLORIDE 0.9 % (FLUSH) 0.9 %
10 SYRINGE (ML) INJECTION EVERY 12 HOURS SCHEDULED
Status: DISCONTINUED | OUTPATIENT
Start: 2021-07-19 | End: 2021-07-19 | Stop reason: HOSPADM

## 2021-07-19 RX ORDER — BALANCED SALT SOLUTION 6.4; .75; .48; .3; 3.9; 1.7 MG/ML; MG/ML; MG/ML; MG/ML; MG/ML; MG/ML
SOLUTION OPHTHALMIC AS NEEDED
Status: DISCONTINUED | OUTPATIENT
Start: 2021-07-19 | End: 2021-07-19 | Stop reason: HOSPADM

## 2021-07-19 RX ORDER — ACETAZOLAMIDE 500 MG/1
500 CAPSULE, EXTENDED RELEASE ORAL ONCE
Status: COMPLETED | OUTPATIENT
Start: 2021-07-19 | End: 2021-07-19

## 2021-07-19 RX ORDER — KETAMINE HYDROCHLORIDE 50 MG/ML
INJECTION, SOLUTION, CONCENTRATE INTRAMUSCULAR; INTRAVENOUS AS NEEDED
Status: DISCONTINUED | OUTPATIENT
Start: 2021-07-19 | End: 2021-07-19 | Stop reason: SURG

## 2021-07-19 RX ORDER — PREDNISOLONE ACETATE 10 MG/ML
SUSPENSION/ DROPS OPHTHALMIC
Qty: 2 ML | Refills: 0
Start: 2021-07-19 | End: 2022-06-21 | Stop reason: HOSPADM

## 2021-07-19 RX ORDER — CYCLOPENT/TROPIC/PHEN/KETR/WAT 1%-1%-2.5%
1 DROPS (EA) OPHTHALMIC (EYE)
Status: COMPLETED | OUTPATIENT
Start: 2021-07-19 | End: 2021-07-19

## 2021-07-19 RX ORDER — PREDNISOLONE ACETATE 10 MG/ML
SUSPENSION/ DROPS OPHTHALMIC AS NEEDED
Status: DISCONTINUED | OUTPATIENT
Start: 2021-07-19 | End: 2021-07-19 | Stop reason: HOSPADM

## 2021-07-19 RX ORDER — SODIUM CHLORIDE 0.9 % (FLUSH) 0.9 %
1-10 SYRINGE (ML) INJECTION AS NEEDED
Status: DISCONTINUED | OUTPATIENT
Start: 2021-07-19 | End: 2021-07-19 | Stop reason: HOSPADM

## 2021-07-19 RX ORDER — PROPOFOL 10 MG/ML
VIAL (ML) INTRAVENOUS AS NEEDED
Status: DISCONTINUED | OUTPATIENT
Start: 2021-07-19 | End: 2021-07-19 | Stop reason: SURG

## 2021-07-19 RX ORDER — TETRACAINE HYDROCHLORIDE 5 MG/ML
SOLUTION OPHTHALMIC AS NEEDED
Status: DISCONTINUED | OUTPATIENT
Start: 2021-07-19 | End: 2021-07-19 | Stop reason: HOSPADM

## 2021-07-19 RX ORDER — SODIUM CHLORIDE, SODIUM LACTATE, POTASSIUM CHLORIDE, CALCIUM CHLORIDE 600; 310; 30; 20 MG/100ML; MG/100ML; MG/100ML; MG/100ML
1000 INJECTION, SOLUTION INTRAVENOUS CONTINUOUS
Status: DISCONTINUED | OUTPATIENT
Start: 2021-07-19 | End: 2021-07-19 | Stop reason: HOSPADM

## 2021-07-19 RX ORDER — LIDOCAINE HYDROCHLORIDE 40 MG/ML
INJECTION, SOLUTION RETROBULBAR; TOPICAL AS NEEDED
Status: DISCONTINUED | OUTPATIENT
Start: 2021-07-19 | End: 2021-07-19 | Stop reason: HOSPADM

## 2021-07-19 RX ORDER — PREDNISOLONE ACETATE 10 MG/ML
1 SUSPENSION/ DROPS OPHTHALMIC SEE ADMIN INSTRUCTIONS
Status: DISCONTINUED | OUTPATIENT
Start: 2021-07-19 | End: 2021-07-19 | Stop reason: HOSPADM

## 2021-07-19 RX ORDER — TETRACAINE HYDROCHLORIDE 5 MG/ML
1 SOLUTION OPHTHALMIC SEE ADMIN INSTRUCTIONS
Status: COMPLETED | OUTPATIENT
Start: 2021-07-19 | End: 2021-07-19

## 2021-07-19 RX ADMIN — Medication 1 DROP: at 14:16

## 2021-07-19 RX ADMIN — PROPOFOL 50 MG: 10 INJECTION, EMULSION INTRAVENOUS at 15:14

## 2021-07-19 RX ADMIN — PROPOFOL 100 MG: 10 INJECTION, EMULSION INTRAVENOUS at 15:01

## 2021-07-19 RX ADMIN — ACETAZOLAMIDE 500 MG: 500 CAPSULE, EXTENDED RELEASE ORAL at 15:40

## 2021-07-19 RX ADMIN — Medication 1 DROP: at 14:21

## 2021-07-19 RX ADMIN — TETRACAINE HYDROCHLORIDE 1 DROP: 5 SOLUTION OPHTHALMIC at 14:10

## 2021-07-19 RX ADMIN — SODIUM CHLORIDE, POTASSIUM CHLORIDE, SODIUM LACTATE AND CALCIUM CHLORIDE 1000 ML: 600; 310; 30; 20 INJECTION, SOLUTION INTRAVENOUS at 14:26

## 2021-07-19 RX ADMIN — KETAMINE HYDROCHLORIDE 20 MG: 50 INJECTION, SOLUTION INTRAMUSCULAR; INTRAVENOUS at 15:01

## 2021-07-19 RX ADMIN — Medication 1 DROP: at 14:11

## 2021-07-19 RX ADMIN — TETRACAINE HYDROCHLORIDE 1 DROP: 5 SOLUTION OPHTHALMIC at 14:09

## 2021-07-19 RX ADMIN — PROPOFOL 100 MG: 10 INJECTION, EMULSION INTRAVENOUS at 15:09

## 2021-07-19 NOTE — OP NOTE
OPERATIVE NOTE    Date of Procedure: 7/19/2021  Patient Name: Remington Rojas  Patient MRN: [id number]  YOB: 1935     Preoperative Diagnosis: Left nuclear sclerotic cataract.     Postoperative Diagnosis: Left nuclear sclerotic cataract.     Procedure Performed: Phacoemulsification with implantation of a  foldable posterior chamber intraocular lens, Left eye.     Surgeon: Td Palmer MD     Anesthesia:  Monitored Anesthesia Care (MAC)      Brief History and Indication: The patient presents with a history of past progressive loss of vision.  Patient was diagnosed with cataract and requests removal for increased ability to read and see.     Operation Description: The patient was taken to the OR and prepped and draped in the usual sterile ophthalmic fashion. A lid speculum was placed in the eye.  A #75 blade was then used to make a stab incision two o’clock hours from the intended temporal clear cornea groove. The anterior chamber was then inflated with a Viscoelastic. A metal microkeratome blade was then used to enter the anterior chamber at the temporal clear cornea site. A three level tunnel incision was made. A curvilinear capsulorrhexis was then performed with a bent cystotome needle and capsulorrhexis forceps.  BSS on a 30 gauge bent cannula was used to hydro-dissect, and hydro-delineate the lens. Good fluid waves and hydro-delineation were noted. Phacoemulsification was then used to remove nuclear material without complications. The residual cortical and lenticular material was then removed with irrigation and aspiration. Viscoelastics were then used to inflate the bag in a soft shell technique. A PCIOL was injected into the bag. Post-implantation, there were no rents or tears in the bag and the lens was noted to be stable and centered. The residual Viscoelastic was then removed with irrigation and aspiration.  The wound was checked and found to be without leaks. Therefore a Polydex  ointment and one drop of a Prednisilone eye drop was placed in the eye.     Implant Information:   Implant Name Type Inv. Item Serial No.  Lot No. LRB No. Used Action   LENS ACRYSOF IQ SA60WF W/ULTRASERT 6X13MM ACU0T0 20 - [id number] - [id number] Implant LENS ACRYSOF IQ SA60WF W/ULTRASERT 6X13MM ACU0T0 20 [id number] [employer]  Left 1 Implanted       Complications: None     [x]   Changed to complex procedure due to: []  hypermature, white cataract. Blue dye was used. [x]   small iris. A Malyugin Ring was used.    Discharge and Condition  The patient was transported to same day surgery in excellent condition and scheduled for follow-up tomorrow morning. The patient was given instructions on use of eye drops for the operative eye and was specifically instructed to call Dr. Palmer at his office or home for any nausea, vomiting, headache, decreased visual acuity, or pain, or if the patient had any general concerns.    Td Palmer MD  7/19/2021

## 2021-07-19 NOTE — DISCHARGE INSTRUCTIONS
Post Operative Cataract Instructions     Left Eye  POST OPERATIVE INSTRUCTIONS  • You have received anesthesia today. DO NOT drive, drink alcohol, sign legal documents.   • After surgery, your eye will not hurt. It may feel scratchy, sticky or uncomfortable. Your eye will be sensitive to light.  • Most people see better 1-3 days after the procedure, but it could take 3 weeks to get the full benefits and reach your visual potential. If your vision is blurry for a few days it is normal, and means you may have swelling outside or inside the eye. For some patients, a bubble is placed and there will be blurriness.   • You should receive a post-op kit with tape and an eye shield. Wear the shield for the first 3 nights after surgery to keep you from rubbing the eye.  • You may wear glasses or sunglasses during the day.  You must keep eye protected at all times.  • Most people are able to return to their normal routine 1-3 days after surgery, however, due to the brain adjusting to your new vision you may have trouble judging distances and want to be careful when driving and going up and downstairs.   • You can shower and wash your hair the day after surgery. Keep water, shampoo, hair spray and shaving lotion out of the eye, especially for the first week.   • If eyes become stuck together after surgery you may soak with warm cloth.  • During the first week, you should AVOID:   1. Rubbing or putting pressure on your eye.  2. Eye make-up, face cream or lotion, hair coloring or perms  3. Strenuous activities, such as running or lifting weights, as to avoid sweat from getting in the eye. Avoid swimming, hot tubs, fumes or rakesh conditions.   4. Sleeping on operative side.  5. Excessive sneezing or coughing.  6. Hanging the head down for periods of time. Keep your head above your heart.    Some discomfort and blurred vision after surgery are normal, but if you have any unusual pain, swelling, bleeding or sudden decrease in  vision, contact our office immediately.     Emergency assistance is available at any time by calling:    Dr.Mark Estela Palmer  [phone]    If unable to reach call [facility] @ [phone]    You have been prescribed an eye drop to use after surgery, please follow these instructions and bring eye drops and instructions with you to post op appointment:    Prednisolone Acetate: Shake well before use    USE 1 DROP 4 (FOUR) TIMES A DAY FOR 1 WEEK THEN STARTING WEEK 2, ONLY USE 2 (TWO) TIMES A DAY UNTIL YOU RUN OUT OF DROPS.     PLACE A POLINA IN THE DAY COLUMN EACH TIME YOU USE TO KEEP ON SCHEDULE    WEEK 1-USE 4  (FOUR) TIMES A DAY DAY 1  []   []    []   []     DAY 2  []   []    []   []  DAY 3  []   []    []   []  DAY 4  []   []    []   []  DAY 5  []   []    []   []  DAY 6  []   []    []   []  DAY 7  []   []    []   []      WEEK 2-USE 2 (TWO)  TIMES A DAY DAY 1  []   []  DAY 2  []   []  DAY 3  []   []  DAY 4  []   []  DAY 5  []   []  DAY 6  []   []  DAY 7  []   []      WEEK 3-USE 2 (TWO) TIMES A DAY DAY 1  []   []  DAY 2  []   []  DAY 3  []   []  DAY 4  []   []  DAY 5  []   []  DAY 6  []   []  DAY 7  []   []      WEEK 4-USE 2 (TWO)TIMES A DAY DAY 1  []   []  DAY 2  []   []  DAY 3  []   []  DAY 4  []   []  DAY 5  []   []  DAY 6  []   []  DAY 7  []   []      Please follow all post op instructions and follow up appointment time from your physician's office included in your discharge packet.  .   No pushing, pulling, tugging,  heavy lifting, or strenuous activity.  No major decision making, driving, or drinking alcoholic beverages for 24 hours. ( due to the medications you have  received)  Always use good hand hygiene/washing techniques.  NO driving while taking pain medications.    * if you have an incision:  Check your incision area every day for signs of infection.   Check for:  * more redness, swelling, or pain  *more fluid or  blood  *warmth  *pus or bad smell  To assist you in voiding:  Drink plenty of fluids  Listen to running water while attempting to void.    If you are unable to urinate and you have an uncomfortable urge to void or it has been   6 hours since you were discharged, return to the Emergency Room

## 2021-07-19 NOTE — ANESTHESIA PREPROCEDURE EVALUATION
Anesthesia Evaluation     Patient summary reviewed and Nursing notes reviewed   no history of anesthetic complications:  NPO Solid Status: > 8 hours  NPO Liquid Status: > 8 hours           Airway   Mallampati: II  TM distance: >3 FB  Neck ROM: full  No difficulty expected  Dental      Pulmonary - negative pulmonary ROS   Cardiovascular     (+) hypertension, past MI , CAD, CABG, hyperlipidemia,       Neuro/Psych  GI/Hepatic/Renal/Endo    (+)  GERD,      Musculoskeletal     Abdominal    Substance History      OB/GYN          Other   arthritis,                        Anesthesia Plan    ASA 3     MAC   (Risks and benefits discussed including risk of aspiration, recall and dental damage. All patient questions answered. Will continue with POC.)  intravenous induction     Anesthetic plan, all risks, benefits, and alternatives have been provided, discussed and informed consent has been obtained with: patient.    Plan discussed with CRNA.

## 2021-07-19 NOTE — H&P
"      [facility]         History and Physical    Patient Name: Remington Rojas  MRN: [id number]  : 1935  Gender: male     HPI: Patient complaint of PPLOV Left eye diagnosed with cataract. Patient requests PHACO PCIOL for Increase of VA/ADL.    History:    Past Medical History:   Diagnosis Date   • Acute myocardial infarction (CMS/HCC)     \"ABOUT 8-10 YEARS AGO\" PATIENT REPORTS   • BPH (benign prostatic hyperplasia)    • CAD (coronary artery disease)    • Elevated cholesterol     REPORTS HAS BEEN ON MEDICATION IN THE PAST   • GERD without esophagitis    • [place] (hard of hearing)    • Hyperlipidemia     UNSPECIFIED   • Hypertension    • Hypothyroidism    • Impaired functional mobility, balance, gait, and endurance    • Overactive bladder    • Seasonal allergies    • Wears dentures        Past Surgical History:   Procedure Laterality Date   • CARDIAC CATHETERIZATION      REPORTS \"ABOUT 8-10 YEARS AGO\" HAD 2 STENTS PLACED   • CATARACT EXTRACTION W/ INTRAOCULAR LENS IMPLANT Right 2021    Procedure: CATARACT PHACO EXTRACTION WITH INTRAOCULAR LENS IMPLANT RIGHT COMLEX W/ MALYUGIN RING;  Surgeon: Td Palmer MD;  Location: [facility];  Service: Ophthalmology;  Laterality: Right;   • CORONARY ARTERY BYPASS GRAFT     • HERNIA REPAIR      BILATERAL INGUINAL HERNIA REPAIRS WITH MESH WITHIN 3 YEARS   • MOUTH SURGERY      FULL TEETH EXTRACTION   • ORIF WRIST FRACTURE Right 2018    Procedure: OPEN REDUCTION INTERNAL FIXATION WITH VOLAR PLATE (SYNTHES), RIGHT WRIST;  Surgeon: Sen Flanagan MD;  Location: [facility];  Service: Orthopedics   • OTHER SURGICAL HISTORY      INGUINAL HERNIA REPAIR BILATERAL   • OTHER SURGICAL HISTORY      PREVIOUS STENT PLACEMENT       Social History     Socioeconomic History   • Marital status:      Spouse name: Not on file   • Number of children: Not on file   • Years of education: Not on file   • Highest education level: Not on file "   Tobacco Use   • Smoking status: Former Smoker   • Smokeless tobacco: Never Used   Substance and Sexual Activity   • Alcohol use: No   • Drug use: No   • Sexual activity: Defer       Family History   Problem Relation Age of Onset   • Coronary artery disease Mother    • Coronary artery disease Sister        Prior to Admission Medications:  Medications Prior to Admission   Medication Sig Dispense Refill Last Dose   • finasteride (PROSCAR) 5 MG tablet Take 5 mg by mouth daily.   7/18/2021 at 0800   • levothyroxine (SYNTHROID, LEVOTHROID) 88 MCG tablet Take 88 mcg by mouth Daily.   7/18/2021 at 0800   • lisinopril (PRINIVIL,ZESTRIL) 2.5 MG tablet Take 2.5 mg by mouth Daily.   7/18/2021 at 0800   • prednisoLONE acetate (Pred Forte) 1 % ophthalmic suspension Follow instructions on the After Visit Summary. 2 mL 0 Past Week at Unknown time   • nitroglycerin (NITROSTAT) 0.4 MG SL tablet Place 0.4 mg under the tongue Every 5 (Five) Minutes As Needed.   Unknown at Unknown time   • ondansetron (ZOFRAN) 4 MG tablet Take 1 tablet by mouth Every 6 (Six) Hours As Needed for Nausea or Vomiting. 12 tablet 0 Unknown at Unknown time       Allergies:  Allergies   Allergen Reactions   • Metoprolol Unknown (See Comments)     PATIENT DOES NOT RECALL ALLERGY REACTION OR TYPE, BUT DR BROWN (CARDIOTHORACIC SURGEON) TOLD HIM NOT TO TAKE IT.        Vitals: Temp:  [97.9 °F (36.6 °C)] 97.9 °F (36.6 °C)  Heart Rate:  [74] 74  Resp:  [16] 16  BP: (136)/(70) 136/70    Review of Systems:   Within Normal Limits Abnormal   HEENT [x]    []     Cardiovascular [x]   []     Gastrointestinal [x]   []     Genitourinary [x]   []     Neurologic [x]   []     Pulmonary [x]   []       Physical Exam:   Within Normal Limits Abnormal   HEENT [x]    []     Heart [x]   []     Lungs [x]   []     Abdomen [x]   []     Extremities [x]   []       Impression: Left nuclear sclerotic cataract.     Plan: CATARACT PHACO EXTRACTION WITH INTRAOCULAR LENS IMPLANT LEFT (Left)      Td Palmer MD  7/19/2021

## 2021-07-19 NOTE — ANESTHESIA POSTPROCEDURE EVALUATION
Patient: Remington Rojas    Procedure Summary     Date: 07/19/21 Room / Location: [facility] OR 1 / [facility] OR    Anesthesia Start: 1458 Anesthesia Stop:     Procedure: CATARACT PHACO EXTRACTION WITH INTRAOCULAR LENS IMPLANT LEFT COMPLEX W/ MALYUGIN RING (Left Eye) Diagnosis:       Nuclear sclerotic cataract of left eye      (Nuclear sclerotic cataract of left eye [H25.12])    Surgeons: Td Palmer MD Provider: Robb Dutton CRNA    Anesthesia Type: MAC ASA Status: 3          Anesthesia Type: MAC    Vitals  HR 66  Sat 98  BP 95/51  Resp 18  Temp 97.6        Post Anesthesia Care and Evaluation    Patient location during evaluation: bedside  Patient participation: complete - patient participated  Level of consciousness: awake and alert and sleepy but conscious  Pain score: 0  Pain management: adequate  Airway patency: patent  Anesthetic complications: No anesthetic complications  PONV Status: none  Cardiovascular status: acceptable  Respiratory status: acceptable  Hydration status: acceptable

## 2022-06-15 ENCOUNTER — HOSPITAL ENCOUNTER (INPATIENT)
Facility: HOSPITAL | Age: 87
LOS: 5 days | Discharge: NURSING, REHAB OR LONG-TERM CARE | End: 2022-06-21
Attending: FAMILY MEDICINE | Admitting: INTERNAL MEDICINE

## 2022-06-15 ENCOUNTER — APPOINTMENT (OUTPATIENT)
Dept: CT IMAGING | Facility: HOSPITAL | Age: 87
End: 2022-06-15

## 2022-06-15 ENCOUNTER — APPOINTMENT (OUTPATIENT)
Dept: GENERAL RADIOLOGY | Facility: HOSPITAL | Age: 87
End: 2022-06-15

## 2022-06-15 DIAGNOSIS — N39.0 URINARY TRACT INFECTION WITHOUT HEMATURIA, SITE UNSPECIFIED: Primary | ICD-10-CM

## 2022-06-15 PROCEDURE — 70450 CT HEAD/BRAIN W/O DYE: CPT

## 2022-06-15 PROCEDURE — 83605 ASSAY OF LACTIC ACID: CPT | Performed by: FAMILY MEDICINE

## 2022-06-15 PROCEDURE — 86140 C-REACTIVE PROTEIN: CPT | Performed by: FAMILY MEDICINE

## 2022-06-15 PROCEDURE — 82140 ASSAY OF AMMONIA: CPT | Performed by: FAMILY MEDICINE

## 2022-06-15 PROCEDURE — 84145 PROCALCITONIN (PCT): CPT | Performed by: FAMILY MEDICINE

## 2022-06-15 PROCEDURE — 87040 BLOOD CULTURE FOR BACTERIA: CPT | Performed by: FAMILY MEDICINE

## 2022-06-15 PROCEDURE — 93005 ELECTROCARDIOGRAM TRACING: CPT | Performed by: FAMILY MEDICINE

## 2022-06-15 PROCEDURE — 85007 BL SMEAR W/DIFF WBC COUNT: CPT | Performed by: FAMILY MEDICINE

## 2022-06-15 PROCEDURE — 71045 X-RAY EXAM CHEST 1 VIEW: CPT

## 2022-06-15 PROCEDURE — 80053 COMPREHEN METABOLIC PANEL: CPT | Performed by: FAMILY MEDICINE

## 2022-06-15 PROCEDURE — P9612 CATHETERIZE FOR URINE SPEC: HCPCS

## 2022-06-15 PROCEDURE — 36415 COLL VENOUS BLD VENIPUNCTURE: CPT

## 2022-06-15 PROCEDURE — 99285 EMERGENCY DEPT VISIT HI MDM: CPT

## 2022-06-15 PROCEDURE — 83880 ASSAY OF NATRIURETIC PEPTIDE: CPT | Performed by: FAMILY MEDICINE

## 2022-06-15 PROCEDURE — 85025 COMPLETE CBC W/AUTO DIFF WBC: CPT | Performed by: FAMILY MEDICINE

## 2022-06-15 PROCEDURE — 84484 ASSAY OF TROPONIN QUANT: CPT | Performed by: FAMILY MEDICINE

## 2022-06-15 PROCEDURE — 83690 ASSAY OF LIPASE: CPT | Performed by: FAMILY MEDICINE

## 2022-06-15 RX ORDER — SODIUM CHLORIDE 0.9 % (FLUSH) 0.9 %
10 SYRINGE (ML) INJECTION AS NEEDED
Status: DISCONTINUED | OUTPATIENT
Start: 2022-06-15 | End: 2022-06-21 | Stop reason: HOSPADM

## 2022-06-15 RX ORDER — ACETAMINOPHEN 500 MG
1000 TABLET ORAL ONCE
Status: COMPLETED | OUTPATIENT
Start: 2022-06-15 | End: 2022-06-16

## 2022-06-16 ENCOUNTER — APPOINTMENT (OUTPATIENT)
Dept: CT IMAGING | Facility: HOSPITAL | Age: 87
End: 2022-06-16

## 2022-06-16 PROBLEM — N39.0 URINARY TRACT INFECTION WITHOUT HEMATURIA: Status: ACTIVE | Noted: 2022-06-16

## 2022-06-16 LAB
ALBUMIN SERPL-MCNC: 4.3 G/DL (ref 3.5–5.2)
ALBUMIN/GLOB SERPL: 1.2 G/DL
ALP SERPL-CCNC: 92 U/L (ref 39–117)
ALT SERPL W P-5'-P-CCNC: 26 U/L (ref 1–41)
AMMONIA BLD-SCNC: 13 UMOL/L (ref 16–60)
ANION GAP SERPL CALCULATED.3IONS-SCNC: 13.9 MMOL/L (ref 5–15)
ANION GAP SERPL CALCULATED.3IONS-SCNC: 14 MMOL/L (ref 5–15)
AST SERPL-CCNC: 27 U/L (ref 1–40)
BACTERIA UR QL AUTO: ABNORMAL /HPF
BASOPHILS # BLD AUTO: 0.09 10*3/MM3 (ref 0–0.2)
BASOPHILS # BLD AUTO: 0.1 10*3/MM3 (ref 0–0.2)
BASOPHILS NFR BLD AUTO: 0.4 % (ref 0–1.5)
BASOPHILS NFR BLD AUTO: 0.4 % (ref 0–1.5)
BILIRUB SERPL-MCNC: 1 MG/DL (ref 0–1.2)
BILIRUB UR QL STRIP: NEGATIVE
BUN SERPL-MCNC: 10 MG/DL (ref 8–23)
BUN SERPL-MCNC: 13 MG/DL (ref 8–23)
BUN/CREAT SERPL: 10.2 (ref 7–25)
BUN/CREAT SERPL: 9.4 (ref 7–25)
CALCIUM SPEC-SCNC: 8.1 MG/DL (ref 8.6–10.5)
CALCIUM SPEC-SCNC: 9 MG/DL (ref 8.6–10.5)
CHLORIDE SERPL-SCNC: 105 MMOL/L (ref 98–107)
CHLORIDE SERPL-SCNC: 97 MMOL/L (ref 98–107)
CLARITY UR: CLEAR
CO2 SERPL-SCNC: 17 MMOL/L (ref 22–29)
CO2 SERPL-SCNC: 20.1 MMOL/L (ref 22–29)
COLOR UR: YELLOW
CREAT SERPL-MCNC: 1.06 MG/DL (ref 0.76–1.27)
CREAT SERPL-MCNC: 1.28 MG/DL (ref 0.76–1.27)
CRP SERPL-MCNC: 4.4 MG/DL (ref 0–0.5)
D-LACTATE SERPL-SCNC: 1.4 MMOL/L (ref 0.5–2)
DEPRECATED RDW RBC AUTO: 40.8 FL (ref 37–54)
DEPRECATED RDW RBC AUTO: 41.9 FL (ref 37–54)
EGFRCR SERPLBLD CKD-EPI 2021: 55.2 ML/MIN/1.73
EGFRCR SERPLBLD CKD-EPI 2021: 69.2 ML/MIN/1.73
EOSINOPHIL # BLD AUTO: 0 10*3/MM3 (ref 0–0.4)
EOSINOPHIL # BLD AUTO: 5.8 10*3/MM3 (ref 0–0.4)
EOSINOPHIL NFR BLD AUTO: 0 % (ref 0.3–6.2)
EOSINOPHIL NFR BLD AUTO: 24.5 % (ref 0.3–6.2)
ERYTHROCYTE [DISTWIDTH] IN BLOOD BY AUTOMATED COUNT: 11.9 % (ref 12.3–15.4)
ERYTHROCYTE [DISTWIDTH] IN BLOOD BY AUTOMATED COUNT: 11.9 % (ref 12.3–15.4)
GLOBULIN UR ELPH-MCNC: 3.6 GM/DL
GLUCOSE SERPL-MCNC: 126 MG/DL (ref 65–99)
GLUCOSE SERPL-MCNC: 147 MG/DL (ref 65–99)
GLUCOSE UR STRIP-MCNC: NEGATIVE MG/DL
HCT VFR BLD AUTO: 41 % (ref 37.5–51)
HCT VFR BLD AUTO: 44.6 % (ref 37.5–51)
HGB BLD-MCNC: 13.6 G/DL (ref 13–17.7)
HGB BLD-MCNC: 15.2 G/DL (ref 13–17.7)
HGB UR QL STRIP.AUTO: ABNORMAL
HYALINE CASTS UR QL AUTO: ABNORMAL /LPF
IMM GRANULOCYTES # BLD AUTO: 0.12 10*3/MM3 (ref 0–0.05)
IMM GRANULOCYTES # BLD AUTO: 0.19 10*3/MM3 (ref 0–0.05)
IMM GRANULOCYTES NFR BLD AUTO: 0.5 % (ref 0–0.5)
IMM GRANULOCYTES NFR BLD AUTO: 0.8 % (ref 0–0.5)
KETONES UR QL STRIP: ABNORMAL
LEUKOCYTE ESTERASE UR QL STRIP.AUTO: ABNORMAL
LIPASE SERPL-CCNC: 14 U/L (ref 13–60)
LYMPHOCYTES # BLD AUTO: 2.48 10*3/MM3 (ref 0.7–3.1)
LYMPHOCYTES # BLD AUTO: 2.58 10*3/MM3 (ref 0.7–3.1)
LYMPHOCYTES # BLD MANUAL: 3.32 10*3/MM3 (ref 0.7–3.1)
LYMPHOCYTES NFR BLD AUTO: 10.5 % (ref 19.6–45.3)
LYMPHOCYTES NFR BLD AUTO: 10.9 % (ref 19.6–45.3)
LYMPHOCYTES NFR BLD MANUAL: 3 % (ref 5–12)
MCH RBC QN AUTO: 32.1 PG (ref 26.6–33)
MCH RBC QN AUTO: 32.4 PG (ref 26.6–33)
MCHC RBC AUTO-ENTMCNC: 33.2 G/DL (ref 31.5–35.7)
MCHC RBC AUTO-ENTMCNC: 34.1 G/DL (ref 31.5–35.7)
MCV RBC AUTO: 95.1 FL (ref 79–97)
MCV RBC AUTO: 96.7 FL (ref 79–97)
MONOCYTES # BLD AUTO: 1.69 10*3/MM3 (ref 0.1–0.9)
MONOCYTES # BLD AUTO: 1.83 10*3/MM3 (ref 0.1–0.9)
MONOCYTES # BLD: 0.71 10*3/MM3 (ref 0.1–0.9)
MONOCYTES NFR BLD AUTO: 7.1 % (ref 5–12)
MONOCYTES NFR BLD AUTO: 7.7 % (ref 5–12)
MRSA DNA SPEC QL NAA+PROBE: NORMAL
NEUTROPHILS # BLD AUTO: 19.65 10*3/MM3 (ref 1.7–7)
NEUTROPHILS NFR BLD AUTO: 13.49 10*3/MM3 (ref 1.7–7)
NEUTROPHILS NFR BLD AUTO: 19.05 10*3/MM3 (ref 1.7–7)
NEUTROPHILS NFR BLD AUTO: 57 % (ref 42.7–76)
NEUTROPHILS NFR BLD AUTO: 80.2 % (ref 42.7–76)
NEUTROPHILS NFR BLD MANUAL: 82 % (ref 42.7–76)
NEUTS BAND NFR BLD MANUAL: 1 % (ref 0–5)
NITRITE UR QL STRIP: NEGATIVE
NRBC BLD AUTO-RTO: 0 /100 WBC (ref 0–0.2)
NRBC BLD AUTO-RTO: 0 /100 WBC (ref 0–0.2)
NT-PROBNP SERPL-MCNC: 310.8 PG/ML (ref 0–1800)
PH UR STRIP.AUTO: 6.5 [PH] (ref 5–8)
PLATELET # BLD AUTO: 168 10*3/MM3 (ref 140–450)
PLATELET # BLD AUTO: 187 10*3/MM3 (ref 140–450)
PMV BLD AUTO: 9.7 FL (ref 6–12)
PMV BLD AUTO: 9.7 FL (ref 6–12)
POTASSIUM SERPL-SCNC: 4.2 MMOL/L (ref 3.5–5.2)
POTASSIUM SERPL-SCNC: 4.2 MMOL/L (ref 3.5–5.2)
PROCALCITONIN SERPL-MCNC: 0.09 NG/ML (ref 0–0.25)
PROT SERPL-MCNC: 7.9 G/DL (ref 6–8.5)
PROT UR QL STRIP: ABNORMAL
RBC # BLD AUTO: 4.24 10*6/MM3 (ref 4.14–5.8)
RBC # BLD AUTO: 4.69 10*6/MM3 (ref 4.14–5.8)
RBC # UR STRIP: ABNORMAL /HPF
RBC MORPH BLD: NORMAL
REF LAB TEST METHOD: ABNORMAL
SARS-COV-2 RNA PNL SPEC NAA+PROBE: NOT DETECTED
SCAN SLIDE: NORMAL
SMALL PLATELETS BLD QL SMEAR: ADEQUATE
SMALL PLATELETS BLD QL SMEAR: ADEQUATE
SODIUM SERPL-SCNC: 131 MMOL/L (ref 136–145)
SODIUM SERPL-SCNC: 136 MMOL/L (ref 136–145)
SP GR UR STRIP: 1.02 (ref 1–1.03)
SQUAMOUS #/AREA URNS HPF: ABNORMAL /HPF
TROPONIN T SERPL-MCNC: <0.01 NG/ML (ref 0–0.03)
UROBILINOGEN UR QL STRIP: ABNORMAL
VARIANT LYMPHS NFR BLD MANUAL: 14 % (ref 19.6–45.3)
WBC # UR STRIP: ABNORMAL /HPF
WBC MORPH BLD: NORMAL
WBC NRBC COR # BLD: 23.68 10*3/MM3 (ref 3.4–10.8)
WBC NRBC COR # BLD: 23.74 10*3/MM3 (ref 3.4–10.8)

## 2022-06-16 PROCEDURE — 87081 CULTURE SCREEN ONLY: CPT | Performed by: INTERNAL MEDICINE

## 2022-06-16 PROCEDURE — 87641 MR-STAPH DNA AMP PROBE: CPT | Performed by: INTERNAL MEDICINE

## 2022-06-16 PROCEDURE — 87086 URINE CULTURE/COLONY COUNT: CPT | Performed by: FAMILY MEDICINE

## 2022-06-16 PROCEDURE — 99222 1ST HOSP IP/OBS MODERATE 55: CPT | Performed by: INTERNAL MEDICINE

## 2022-06-16 PROCEDURE — 85025 COMPLETE CBC W/AUTO DIFF WBC: CPT | Performed by: INTERNAL MEDICINE

## 2022-06-16 PROCEDURE — 74177 CT ABD & PELVIS W/CONTRAST: CPT

## 2022-06-16 PROCEDURE — 87077 CULTURE AEROBIC IDENTIFY: CPT | Performed by: FAMILY MEDICINE

## 2022-06-16 PROCEDURE — 97161 PT EVAL LOW COMPLEX 20 MIN: CPT

## 2022-06-16 PROCEDURE — 80048 BASIC METABOLIC PNL TOTAL CA: CPT | Performed by: INTERNAL MEDICINE

## 2022-06-16 PROCEDURE — 81001 URINALYSIS AUTO W/SCOPE: CPT | Performed by: FAMILY MEDICINE

## 2022-06-16 PROCEDURE — 87635 SARS-COV-2 COVID-19 AMP PRB: CPT | Performed by: INTERNAL MEDICINE

## 2022-06-16 PROCEDURE — 87186 SC STD MICRODIL/AGAR DIL: CPT | Performed by: FAMILY MEDICINE

## 2022-06-16 PROCEDURE — 25010000002 CEFTRIAXONE SODIUM-DEXTROSE 2-2.22 GM-%(50ML) RECONSTITUTED SOLUTION: Performed by: FAMILY MEDICINE

## 2022-06-16 PROCEDURE — 97165 OT EVAL LOW COMPLEX 30 MIN: CPT

## 2022-06-16 PROCEDURE — 87102 FUNGUS ISOLATION CULTURE: CPT | Performed by: INTERNAL MEDICINE

## 2022-06-16 PROCEDURE — 25010000002 IOPAMIDOL 61 % SOLUTION: Performed by: FAMILY MEDICINE

## 2022-06-16 PROCEDURE — 25010000002 HEPARIN (PORCINE) PER 1000 UNITS: Performed by: INTERNAL MEDICINE

## 2022-06-16 RX ORDER — HEPARIN SODIUM 5000 [USP'U]/ML
5000 INJECTION, SOLUTION INTRAVENOUS; SUBCUTANEOUS EVERY 8 HOURS SCHEDULED
Status: DISCONTINUED | OUTPATIENT
Start: 2022-06-16 | End: 2022-06-21 | Stop reason: HOSPADM

## 2022-06-16 RX ORDER — LEVOTHYROXINE SODIUM 88 UG/1
88 TABLET ORAL DAILY
Status: DISCONTINUED | OUTPATIENT
Start: 2022-06-16 | End: 2022-06-21 | Stop reason: HOSPADM

## 2022-06-16 RX ORDER — ACETAMINOPHEN 650 MG/1
650 SUPPOSITORY RECTAL EVERY 4 HOURS PRN
Status: DISCONTINUED | OUTPATIENT
Start: 2022-06-16 | End: 2022-06-21 | Stop reason: HOSPADM

## 2022-06-16 RX ORDER — SODIUM CHLORIDE 0.9 % (FLUSH) 0.9 %
10 SYRINGE (ML) INJECTION AS NEEDED
Status: DISCONTINUED | OUTPATIENT
Start: 2022-06-16 | End: 2022-06-21 | Stop reason: HOSPADM

## 2022-06-16 RX ORDER — CEFTRIAXONE 2 G/50ML
2 INJECTION, SOLUTION INTRAVENOUS ONCE
Status: COMPLETED | OUTPATIENT
Start: 2022-06-16 | End: 2022-06-16

## 2022-06-16 RX ORDER — ACETAMINOPHEN 325 MG/1
650 TABLET ORAL EVERY 4 HOURS PRN
Status: DISCONTINUED | OUTPATIENT
Start: 2022-06-16 | End: 2022-06-21 | Stop reason: HOSPADM

## 2022-06-16 RX ORDER — SODIUM CHLORIDE 9 MG/ML
50 INJECTION, SOLUTION INTRAVENOUS CONTINUOUS
Status: DISCONTINUED | OUTPATIENT
Start: 2022-06-16 | End: 2022-06-17

## 2022-06-16 RX ORDER — AMOXICILLIN 250 MG
2 CAPSULE ORAL 2 TIMES DAILY
Status: DISCONTINUED | OUTPATIENT
Start: 2022-06-16 | End: 2022-06-21 | Stop reason: HOSPADM

## 2022-06-16 RX ORDER — TAMSULOSIN HYDROCHLORIDE 0.4 MG/1
1 CAPSULE ORAL 2 TIMES DAILY
COMMUNITY

## 2022-06-16 RX ORDER — ACETAMINOPHEN 160 MG/5ML
650 SOLUTION ORAL EVERY 4 HOURS PRN
Status: DISCONTINUED | OUTPATIENT
Start: 2022-06-16 | End: 2022-06-21 | Stop reason: HOSPADM

## 2022-06-16 RX ORDER — FINASTERIDE 5 MG/1
5 TABLET, FILM COATED ORAL DAILY
Status: DISCONTINUED | OUTPATIENT
Start: 2022-06-16 | End: 2022-06-21 | Stop reason: HOSPADM

## 2022-06-16 RX ORDER — ONDANSETRON 2 MG/ML
4 INJECTION INTRAMUSCULAR; INTRAVENOUS EVERY 6 HOURS PRN
Status: DISCONTINUED | OUTPATIENT
Start: 2022-06-16 | End: 2022-06-21 | Stop reason: HOSPADM

## 2022-06-16 RX ORDER — BISACODYL 5 MG/1
5 TABLET, DELAYED RELEASE ORAL DAILY PRN
Status: DISCONTINUED | OUTPATIENT
Start: 2022-06-16 | End: 2022-06-21 | Stop reason: HOSPADM

## 2022-06-16 RX ORDER — BISACODYL 10 MG
10 SUPPOSITORY, RECTAL RECTAL DAILY PRN
Status: DISCONTINUED | OUTPATIENT
Start: 2022-06-16 | End: 2022-06-21 | Stop reason: HOSPADM

## 2022-06-16 RX ORDER — SODIUM CHLORIDE 0.9 % (FLUSH) 0.9 %
10 SYRINGE (ML) INJECTION EVERY 12 HOURS SCHEDULED
Status: DISCONTINUED | OUTPATIENT
Start: 2022-06-16 | End: 2022-06-21 | Stop reason: HOSPADM

## 2022-06-16 RX ORDER — L.ACID,PARA/B.BIFIDUM/S.THERM 8B CELL
1 CAPSULE ORAL DAILY
Status: DISCONTINUED | OUTPATIENT
Start: 2022-06-16 | End: 2022-06-21 | Stop reason: HOSPADM

## 2022-06-16 RX ORDER — CHOLECALCIFEROL (VITAMIN D3) 125 MCG
5 CAPSULE ORAL NIGHTLY PRN
Status: DISCONTINUED | OUTPATIENT
Start: 2022-06-16 | End: 2022-06-21 | Stop reason: HOSPADM

## 2022-06-16 RX ORDER — CEFTRIAXONE 1 G/50ML
1 INJECTION, SOLUTION INTRAVENOUS EVERY 24 HOURS
Status: DISCONTINUED | OUTPATIENT
Start: 2022-06-17 | End: 2022-06-18

## 2022-06-16 RX ORDER — POLYETHYLENE GLYCOL 3350 17 G/17G
17 POWDER, FOR SOLUTION ORAL DAILY PRN
Status: DISCONTINUED | OUTPATIENT
Start: 2022-06-16 | End: 2022-06-21 | Stop reason: HOSPADM

## 2022-06-16 RX ADMIN — ACETAMINOPHEN 1000 MG: 500 TABLET ORAL at 00:17

## 2022-06-16 RX ADMIN — Medication 10 ML: at 21:11

## 2022-06-16 RX ADMIN — CEFTRIAXONE 2 G: 2 INJECTION, SOLUTION INTRAVENOUS at 04:04

## 2022-06-16 RX ADMIN — SODIUM CHLORIDE 100 ML/HR: 9 INJECTION, SOLUTION INTRAVENOUS at 06:39

## 2022-06-16 RX ADMIN — SODIUM CHLORIDE 1000 ML: 9 INJECTION, SOLUTION INTRAVENOUS at 00:17

## 2022-06-16 RX ADMIN — Medication 1 CAPSULE: at 09:17

## 2022-06-16 RX ADMIN — Medication 10 ML: at 09:18

## 2022-06-16 RX ADMIN — SODIUM CHLORIDE 1000 ML: 9 INJECTION, SOLUTION INTRAVENOUS at 04:04

## 2022-06-16 RX ADMIN — HEPARIN SODIUM 5000 UNITS: 5000 INJECTION INTRAVENOUS; SUBCUTANEOUS at 06:40

## 2022-06-16 RX ADMIN — SENNOSIDES AND DOCUSATE SODIUM 2 TABLET: 50; 8.6 TABLET ORAL at 09:17

## 2022-06-16 RX ADMIN — HEPARIN SODIUM 5000 UNITS: 5000 INJECTION INTRAVENOUS; SUBCUTANEOUS at 14:10

## 2022-06-16 RX ADMIN — HEPARIN SODIUM 5000 UNITS: 5000 INJECTION INTRAVENOUS; SUBCUTANEOUS at 21:11

## 2022-06-16 RX ADMIN — LEVOTHYROXINE SODIUM 88 MCG: 88 TABLET ORAL at 09:17

## 2022-06-16 RX ADMIN — IOPAMIDOL 100 ML: 612 INJECTION, SOLUTION INTRAVENOUS at 02:14

## 2022-06-16 NOTE — ED NOTES
Report given to Pedrito at this time. Pt to be transported to Room 430 when Orderly is available.

## 2022-06-16 NOTE — CASE MANAGEMENT/SOCIAL WORK
Discharge Planning Assessment  [facility]     Patient Name: Remington Lizama  MRN: [id number]  Today's Date: 6/16/2022    Admit Date: 6/15/2022     Discharge Needs Assessment     Row Name 06/16/22 1248       Living Environment    People in Home facility resident    Name(s) of People in Home [facility]    Current Living Arrangements assisted living facility    Potentially Unsafe Housing Conditions --  no issues    Primary Care Provided by self  able to bath and feed self    Family Caregiver if Needed child(teagan), adult    Family Caregiver Names jenna lizama son    Able to Return to Prior Arrangements yes       Resource/Environmental Concerns    Resource/Environmental Concerns none    Transportation Concerns none       Transition Planning    Patient/Family Anticipates Transition to home    Patient/Family Anticipated Services at Transition none    Transportation Anticipated family or friend will provide       Discharge Needs Assessment    Readmission Within the Last 30 Days no previous admission in last 30 days    Equipment Currently Used at Home wheelchair;walker, rolling;cane, quad;cane, straight    Anticipated Changes Related to Illness inability to care for self    Equipment Needed After Discharge none    Outpatient/Agency/Support Group Needs assisted living facility               Discharge Plan     Row Name 06/16/22 1256       Plan    Plan retrun to [facility]    Plan Comments information from son and  health care surrogate Jenna Lizama [phone],  patient lives at [facility],   he goes to the lunch room for meals   he is able to feed and bathe self,   has been able to give medicines but he has been skipping some   son has talked with [facility]  and they will be giving him his medicines,  no oxygen, no HH, has a rolling walker dont use,  uses a wheelchair to go to lunch room and activities room,  has advance directives on file, plans on him returning to [facility]   son  will transport              Continued Care and Services - Admitted Since 6/15/2022    Coordination has not been started for this encounter.       Expected Discharge Date and Time     Expected Discharge Date Expected Discharge Time    Jun 18, 2022          Demographic Summary     Row Name 06/16/22 1246       General Information    Admission Type inpatient    Referral Source admission list    Reason for Consult discharge planning    Preferred Language English    General Information Comments Reagan Rojas  son and health care surrogate               Functional Status     Row Name 06/16/22 1247       Functional Status    Usual Activity Tolerance moderate    Current Activity Tolerance fair       Functional Status, IADL    Medications independent    Meal Preparation assistive person    Housekeeping assistive person    Laundry assistive person    Shopping assistive person       Employment/    Employment Status retired               Psychosocial    No documentation.                Abuse/Neglect    No documentation.                Legal     Row Name 06/16/22 1247       Financial/Legal    Source of Income salary/wages    Financial/Environmental Concerns --  no issues               Substance Abuse    No documentation.                Patient Forms    No documentation.                   Nadeen Gonzalez RN

## 2022-06-16 NOTE — THERAPY EVALUATION
"Patient Name: Remington Rojas  : 1935    MRN: [id number]                              Today's Date: 2022       Admit Date: 6/15/2022    Visit Dx:     ICD-10-CM ICD-9-CM   1. Urinary tract infection without hematuria, site unspecified  N39.0 599.0     Patient Active Problem List   Diagnosis   • Preop examination   • Fever   • Nuclear sclerotic cataract of right eye   • Nuclear sclerotic cataract of left eye   • Urinary tract infection without hematuria     Past Medical History:   Diagnosis Date   • Acute myocardial infarction (HCC)     \"ABOUT 8-10 YEARS AGO\" PATIENT REPORTS   • BPH (benign prostatic hyperplasia)    • CAD (coronary artery disease)    • Elevated cholesterol     REPORTS HAS BEEN ON MEDICATION IN THE PAST   • GERD without esophagitis    • [place] (hard of hearing)    • Hyperlipidemia     UNSPECIFIED   • Hypertension    • Hypothyroidism    • Impaired functional mobility, balance, gait, and endurance    • Overactive bladder    • Seasonal allergies    • Wears dentures      Past Surgical History:   Procedure Laterality Date   • CARDIAC CATHETERIZATION      REPORTS \"ABOUT 8-10 YEARS AGO\" HAD 2 STENTS PLACED   • CATARACT EXTRACTION W/ INTRAOCULAR LENS IMPLANT Right 2021    Procedure: CATARACT PHACO EXTRACTION WITH INTRAOCULAR LENS IMPLANT RIGHT COMLEX W/ MALYUGIN RING;  Surgeon: Td Palmer MD;  Location: [facility];  Service: Ophthalmology;  Laterality: Right;   • CATARACT EXTRACTION W/ INTRAOCULAR LENS IMPLANT Left 2021    Procedure: CATARACT PHACO EXTRACTION WITH INTRAOCULAR LENS IMPLANT LEFT COMPLEX W/ MALYUGIN RING;  Surgeon: Td Palmer MD;  Location: [facility];  Service: Ophthalmology;  Laterality: Left;   • CORONARY ARTERY BYPASS GRAFT     • HERNIA REPAIR      BILATERAL INGUINAL HERNIA REPAIRS WITH MESH WITHIN 3 YEARS   • MOUTH SURGERY      FULL TEETH EXTRACTION   • ORIF WRIST FRACTURE Right 2018    Procedure: OPEN REDUCTION INTERNAL FIXATION WITH VOLAR " PLATE (SYNTHES), RIGHT WRIST;  Surgeon: Sen Flanagan MD;  Location: [facility];  Service: Orthopedics   • OTHER SURGICAL HISTORY      INGUINAL HERNIA REPAIR BILATERAL   • OTHER SURGICAL HISTORY      PREVIOUS STENT PLACEMENT      General Information     Row Name 06/16/22 1540          Physical Therapy Time and Intention    Document Type evaluation (P)   -     Mode of Treatment physical therapy (P)   -     Row Name 06/16/22 1540          General Information    Patient Profile Reviewed yes (P)   -MK     Prior Level of Function independent:;ADL's (P)   -     Existing Precautions/Restrictions swallow precautions;fall (P)   -     Barriers to Rehab hearing deficit (P)   -     Row Name 06/16/22 1540          Living Environment    People in Home facility resident (P)   -     Row Name 06/16/22 1540          Home Main Entrance    Number of Stairs, Main Entrance none (P)   -     Row Name 06/16/22 1540          Stairs Within Home, Primary    Number of Stairs, Within Home, Primary none (P)   -     Row Name 06/16/22 1540          Cognition    Orientation Status (Cognition) oriented to;person;place (P)   -     Row Name 06/16/22 1540          Safety Issues, Functional Mobility    Safety Issues Affecting Function (Mobility) insight into deficits/self-awareness;safety precaution awareness;safety precautions follow-through/compliance (P)   -     Impairments Affecting Function (Mobility) endurance/activity tolerance (P)   -           User Key  (r) = Recorded By, (t) = Taken By, (c) = Cosigned By    Initials Name Provider Type     Pablo Gaspar, PT Student PT Student               Mobility     Row Name 06/16/22 1541          Bed Mobility    Bed Mobility supine-sit (P)   -     Supine-Sit [place] (Bed Mobility) moderate assist (50% patient effort);2 person assist (P)   -     Assistive Device (Bed Mobility) head of bed elevated;bed rails (P)   -     Row Name 06/16/22 1541          Sit-Stand Transfer     Sit-Stand [place] (Transfers) minimum assist (75% patient effort) (P)   -MK     Assistive Device (Sit-Stand Transfers) walker, front-wheeled (P)   -MK     Row Name 06/16/22 1541          Gait/Stairs (Locomotion)    [place] Level (Gait) minimum assist (75% patient effort) (P)   -MK     Assistive Device (Gait) walker, front-wheeled (P)   -MK     Distance in Feet (Gait) 2 ft (P)   -MK           User Key  (r) = Recorded By, (t) = Taken By, (c) = Cosigned By    Initials Name Provider Type    Pablo Sandoval, PT Student PT Student               Obj/Interventions     Row Name 06/16/22 1542          Balance    Balance Assessment sitting static balance;sitting dynamic balance;sit to stand dynamic balance;standing static balance;standing dynamic balance (P)   -MK     Static Sitting Balance contact guard (P)   -MK     Dynamic Sitting Balance contact guard (P)   -MK     Position, Sitting Balance unsupported;sitting edge of bed (P)   -MK     Sit to Stand Dynamic Balance minimal assist (P)   -MK     Static Standing Balance minimal assist (P)   -MK     Dynamic Standing Balance minimal assist (P)   -MK     Position/Device Used, Standing Balance walker, front-wheeled (P)   -MK     Balance Interventions sitting;standing;sit to stand (P)   -           User Key  (r) = Recorded By, (t) = Taken By, (c) = Cosigned By    Initials Name Provider Type    Pablo Sandoval, PT Student PT Student               Goals/Plan     Row Name 06/16/22 1546          Bed Mobility Goal 1 (PT)    Activity/Assistive Device (Bed Mobility Goal 1, PT) bed mobility activities, all (P)   -MK     [place] Level/Cues Needed (Bed Mobility Goal 1, PT) independent (P)   -MK     Time Frame (Bed Mobility Goal 1, PT) short term goal (STG) (P)   -MK     Row Name 06/16/22 1546          Transfer Goal 1 (PT)    Activity/Assistive Device (Transfer Goal 1, PT) transfers, all (P)   -MK     [place] Level/Cues Needed (Transfer Goal 1, PT) independent (P)    -     Time Frame (Transfer Goal 1, PT) short term goal (STG) (P)   -     Row Name 06/16/22 1546          Gait Training Goal 1 (PT)    Activity/Assistive Device (Gait Training Goal 1, PT) gait (walking locomotion) (P)   -     [place] Level (Gait Training Goal 1, PT) contact guard required (P)   -MK     Distance (Gait Training Goal 1, PT) 50 ft (P)   -MK     Time Frame (Gait Training Goal 1, PT) short term goal (STG) (P)   -     Row Name 06/16/22 1546          Patient Education Goal (PT)    Activity (Patient Education Goal, PT) Pt to complete BLE ex x 15 reps (P)   -     [place]/Cues/Accuracy (Memory Goal 2, PT) demonstrates adequately (P)   -     Time Frame (Patient Education Goal, PT) short term goal (STG) (P)   -     Row Name 06/16/22 1546          Therapy Assessment/Plan (PT)    Planned Therapy Interventions (PT) balance training;bed mobility training;gait training;home exercise program;strengthening (P)   -           User Key  (r) = Recorded By, (t) = Taken By, (c) = Cosigned By    Initials Name Provider Type    Pablo Sandoval, PT Student PT Student               Clinical Impression     Row Name 06/16/22 1548          Pain    Pretreatment Pain Rating 0/10 - no pain (P)   -     Posttreatment Pain Rating 0/10 - no pain (P)   -     Additional Documentation Pain Scale: Numbers Pre/Post-Treatment (Group) (P)   -     Row Name 06/16/22 1542          Plan of Care Review    Plan of Care Reviewed With patient (P)   -     Progress no change (P)   -     Outcome Evaluation Pt participated in PT evaluation today. Pt was received supine and was [place]. Pt came from supine to sit mod A x2 and sat EOB x5 min CGA. Pt completed STS min A and stood min A using RW x 5 min. Pt side stepped 2 ft toward HOB min A using RW and sat down. Pt was returned to bed. Pt expected to benefit from skilled PT during this inpatient stay. (P)   -     Row Name 06/16/22 1548          Therapy Assessment/Plan (PT)     Criteria for Skilled Interventions Met (PT) yes;skilled treatment is necessary (P)   -MK     Therapy Frequency (PT) 5 times/wk (P)   -MK     Row Name 06/16/22 1543          Vital Signs    O2 Delivery Pre Treatment room air (P)   -MK     O2 Delivery Intra Treatment room air (P)   -MK     O2 Delivery Post Treatment room air (P)   -MK     Pre Patient Position Supine (P)   -MK     Intra Patient Position Standing (P)   -MK     Post Patient Position Sitting (P)   -     Row Name 06/16/22 1543          Positioning and Restraints    Pre-Treatment Position in bed (P)   -MK     Post Treatment Position bed (P)   -MK     In Bed sitting;encouraged to call for assist;call light within reach (P)   -MK           User Key  (r) = Recorded By, (t) = Taken By, (c) = Cosigned By    Initials Name Provider Type    Pablo Sandoval, PT Student PT Student               Outcome Measures     Row Name 06/16/22 1547          How much help from another person do you currently need...    Turning from your back to your side while in flat bed without using bedrails? 3 (P)   -MK     Moving from lying on back to sitting on the side of a flat bed without bedrails? 3 (P)   -MK     Moving to and from a bed to a chair (including a wheelchair)? 2 (P)   -MK     Standing up from a chair using your arms (e.g., wheelchair, bedside chair)? 2 (P)   -MK     Climbing 3-5 steps with a railing? 1 (P)   -MK     To walk in hospital room? 2 (P)   -MK     AM-PAC 6 Clicks Score (PT) 13 (P)   -MK     Highest level of mobility 4 --> Transferred to chair/commode (P)   -     Row Name 06/16/22 1547          Functional Assessment    Outcome Measure Options AM-PAC 6 Clicks Basic Mobility (PT) (P)   -MK           User Key  (r) = Recorded By, (t) = Taken By, (c) = Cosigned By    Initials Name Provider Type    Pablo Sandoval, PT Student PT Student                             Physical Therapy Education                 Title: PT OT SLP Therapies (In Progress)     Topic:  Physical Therapy (In Progress)     Point: Mobility training (Done)     Learning Progress Summary           Patient Acceptance, E, VU by  at 6/16/2022 1548                   Point: Home exercise program (Not Started)     Learner Progress:  Not documented in this visit.          Point: Body mechanics (Not Started)     Learner Progress:  Not documented in this visit.          Point: Precautions (Not Started)     Learner Progress:  Not documented in this visit.                      User Key     Initials Effective Dates Name Provider Type Discipline     05/11/22 -  Pablo Gaspar, PT Student PT Student PT              PT Recommendation and Plan  Planned Therapy Interventions (PT): (P) balance training, bed mobility training, gait training, home exercise program, strengthening  Plan of Care Reviewed With: (P) patient  Progress: (P) no change  Outcome Evaluation: (P) Pt participated in PT evaluation today. Pt was received supine and was [place]. Pt came from supine to sit mod A x2 and sat EOB x5 min CGA. Pt completed STS min A and stood min A using RW x 5 min. Pt side stepped 2 ft toward HOB min A using RW and sat down. Pt was returned to bed. Pt expected to benefit from skilled PT during this inpatient stay.     Time Calculation:    PT Charges     Row Name 06/16/22 1548             Time Calculation    Start Time 1344 (P)   -      PT Received On 06/16/22 (P)   -      PT Goal Re-Cert Due Date 06/26/22 (P)   -              Untimed Charges    PT Eval/Re-eval Minutes 45 (P)   -              Total Minutes    Untimed Charges Total Minutes 45 (P)   -       Total Minutes 45 (P)   -            User Key  (r) = Recorded By, (t) = Taken By, (c) = Cosigned By    Initials Name Provider Type     Pablo Gaspar, PT Student PT Student              Therapy Charges for Today     Code Description Service Date Service Provider Modifiers Qty    [id number] HC PT EVAL LOW COMPLEXITY 3 6/16/2022 Pablo Gaspar, PT Student GP 1           PT G-Codes  Outcome Measure Options: (P) AM-PAC 6 Clicks Basic Mobility (PT)  AM-PAC 6 Clicks Score (PT): (P) 13    Pablo Gaspar, PT Student  6/16/2022

## 2022-06-16 NOTE — THERAPY EVALUATION
"Patient Name: Remington Rojas  : 1935    MRN: [id number]                              Today's Date: 2022       Admit Date: 6/15/2022    Visit Dx:     ICD-10-CM ICD-9-CM   1. Urinary tract infection without hematuria, site unspecified  N39.0 599.0     Patient Active Problem List   Diagnosis   • Preop examination   • Fever   • Nuclear sclerotic cataract of right eye   • Nuclear sclerotic cataract of left eye   • Urinary tract infection without hematuria     Past Medical History:   Diagnosis Date   • Acute myocardial infarction (HCC)     \"ABOUT 8-10 YEARS AGO\" PATIENT REPORTS   • BPH (benign prostatic hyperplasia)    • CAD (coronary artery disease)    • Elevated cholesterol     REPORTS HAS BEEN ON MEDICATION IN THE PAST   • GERD without esophagitis    • [place] (hard of hearing)    • Hyperlipidemia     UNSPECIFIED   • Hypertension    • Hypothyroidism    • Impaired functional mobility, balance, gait, and endurance    • Overactive bladder    • Seasonal allergies    • Wears dentures      Past Surgical History:   Procedure Laterality Date   • CARDIAC CATHETERIZATION      REPORTS \"ABOUT 8-10 YEARS AGO\" HAD 2 STENTS PLACED   • CATARACT EXTRACTION W/ INTRAOCULAR LENS IMPLANT Right 2021    Procedure: CATARACT PHACO EXTRACTION WITH INTRAOCULAR LENS IMPLANT RIGHT COMLEX W/ MALYUGIN RING;  Surgeon: Td Palmer MD;  Location: [facility] OR;  Service: Ophthalmology;  Laterality: Right;   • CATARACT EXTRACTION W/ INTRAOCULAR LENS IMPLANT Left 2021    Procedure: CATARACT PHACO EXTRACTION WITH INTRAOCULAR LENS IMPLANT LEFT COMPLEX W/ MALYUGIN RING;  Surgeon: Td Palmer MD;  Location: [facility] OR;  Service: Ophthalmology;  Laterality: Left;   • CORONARY ARTERY BYPASS GRAFT     • HERNIA REPAIR      BILATERAL INGUINAL HERNIA REPAIRS WITH MESH WITHIN 3 YEARS   • MOUTH SURGERY      FULL TEETH EXTRACTION   • ORIF WRIST FRACTURE Right 2018    Procedure: OPEN REDUCTION INTERNAL FIXATION WITH VOLAR " PLATE (SYNTHES), RIGHT WRIST;  Surgeon: Sen Flanagan MD;  Location: [facility];  Service: Orthopedics   • OTHER SURGICAL HISTORY      INGUINAL HERNIA REPAIR BILATERAL   • OTHER SURGICAL HISTORY      PREVIOUS STENT PLACEMENT      General Information     Row Name 06/16/22 1604          OT Time and Intention    Document Type evaluation  -     Mode of Treatment occupational therapy  -     Row Name 06/16/22 1604          General Information    Patient Profile Reviewed yes  -     Prior Level of Function independent:;ADL's  per pt report  -     Existing Precautions/Restrictions fall  -     Barriers to Rehab hearing deficit;previous functional deficit  -     Row Name 06/16/22 1604          Occupational Profile    Reason for Services/Referral (Occupational Profile) ADL decline  -     Row Name 06/16/22 1604          Living Environment    People in Home facility resident  -     Row Name 06/16/22 1604          Home Main Entrance    Number of Stairs, Main Entrance none  -     Row Name 06/16/22 1604          Cognition    Orientation Status (Cognition) oriented to;person;place  -[facility] Name 06/16/22 1604          Safety Issues, Functional Mobility    Safety Issues Affecting Function (Mobility) insight into deficits/self-awareness;awareness of need for assistance;safety precaution awareness;safety precautions follow-through/compliance  -     Impairments Affecting Function (Mobility) endurance/activity tolerance;strength;balance  -           User Key  (r) = Recorded By, (t) = Taken By, (c) = Cosigned By    Initials Name Provider Type     Yesy Henry Occupational Therapist                 Mobility/ADL's     Row Name 06/16/22 1602          Bed Mobility    Bed Mobility supine-sit;sit-supine  -     Supine-Sit [place] (Bed Mobility) moderate assist (50% patient effort);2 person assist  -     Sit-Supine [place] (Bed Mobility) moderate assist (50% patient effort);2 person assist  -      Assistive Device (Bed Mobility) head of bed elevated;bed rails  -AH     Row Name 06/16/22 1605          Transfers    Transfers sit-stand transfer  -     Sit-Stand [place] (Transfers) verbal cues;minimum assist (75% patient effort)  -AH     Row Name 06/16/22 1605          Sit-Stand Transfer    Assistive Device (Sit-Stand Transfers) walker, front-wheeled  -AH     Row Name 06/16/22 1605          Functional Mobility    Functional Mobility- Ind. Level minimum assist (75% patient effort)  -     Functional Mobility- Device walker, front-wheeled  -     Functional Mobility-Distance (Feet) 2  -AH     Row Name 06/16/22 1605          Activities of Daily Living    BADL Assessment/Intervention bathing;upper body dressing;lower body dressing;grooming;feeding;toileting  -AH     Row Name 06/16/22 1605          Bathing Assessment/Intervention    [place] Level (Bathing) moderate assist (50% patient effort)  -AH     Row Name 06/16/22 1605          Upper Body Dressing Assessment/Training    [place] Level (Upper Body Dressing) minimum assist (75% patient effort)  -AH     Row Name 06/16/22 1605          Lower Body Dressing Assessment/Training    [place] Level (Lower Body Dressing) moderate assist (50% patient effort)  -AH     Row Name 06/16/22 1605          Grooming Assessment/Training    [place] Level (Grooming) set up  -AH     Row Name 06/16/22 1605          Self-Feeding Assessment/Training    [place] Level (Feeding) set up  -AH     Row Name 06/16/22 1605          Toileting Assessment/Training    [place] Level (Toileting) moderate assist (50% patient effort)  -           User Key  (r) = Recorded By, (t) = Taken By, (c) = Cosigned By    Initials Name Provider Type    Yesy Guevara Occupational Therapist               Obj/Interventions     [facility] Name 06/16/22 1607          Sensory Assessment (Somatosensory)    Sensory Assessment (Somatosensory) sensation intact  -AH     Row Name 06/16/22 1608           Vision Assessment/Intervention    Visual Impairment/Limitations [facility] Name 06/16/22 1607          Range of Motion Comprehensive    General Range of Motion bilateral upper extremity ROM [facility] Name 06/16/22 1607          Strength Comprehensive (MMT)    Comment, General Manual Muscle Testing (MMT) Assessment [facility]           User Key  (r) = Recorded By, (t) = Taken By, (c) = Cosigned By    Initials Name Provider Type     Yesy Henry Occupational Therapist               Goals/Plan     Row Name 06/16/22 1610          Bed Mobility Goal 1 (OT)    Activity/Assistive Device (Bed Mobility Goal 1, OT) bed mobility activities, all  -     [place] Level/Cues Needed (Bed Mobility Goal 1, OT) minimum assist (75% or more patient effort)  -     Time Frame (Bed Mobility Goal 1, OT) by discharge  -     Progress/Outcomes (Bed Mobility Goal 1, OT) goal ongoing  -AH     Row Name 06/16/22 1610          Transfer Goal 1 (OT)    Activity/Assistive Device (Transfer Goal 1, OT) sit-to-stand/stand-to-sit;walker, rolling  -     [place] Level/Cues Needed (Transfer Goal 1, OT) contact guard required  -     Time Frame (Transfer Goal 1, OT) long term goal (LTG);1 week  -     Progress/Outcome (Transfer Goal 1, OT) goal ongoing  -AH     Row Name 06/16/22 1610          Bathing Goal 1 (OT)    Activity/Device (Bathing Goal 1, OT) bathing skills, all  -     [place] Level/Cues Needed (Bathing Goal 1, OT) minimum assist (75% or more patient effort)  -     Time Frame (Bathing Goal 1, OT) long term goal (LTG);1 week  -     Progress/Outcomes (Bathing Goal 1, OT) goal ongoing  -AH     Row Name 06/16/22 1610          Dressing Goal 1 (OT)    Activity/Device (Dressing Goal 1, OT) lower body dressing;reacher;sock-aid  -     [place]/Cues Needed (Dressing Goal 1, OT) minimum assist (75% or more patient effort)  -     Time Frame (Dressing Goal 1, OT) by discharge  -      Progress/Outcome (Dressing Goal 1, OT) goal ongoing  -[facility] Name 06/16/22 1610          Strength Goal 1 (OT)    Strength Goal 1 (OT) Pt will perform UB strengthening ex using theraband for resistance.  -     Time Frame (Strength Goal 1, OT) by discharge  -     Progress/Outcome (Strength Goal 1, OT) goal ongoing  -[facility] Name 06/16/22 1610          Therapy Assessment/Plan (OT)    Planned Therapy Interventions (OT) activity tolerance training;adaptive equipment training;BADL retraining;patient/caregiver education/training;transfer/mobility retraining;strengthening exercise  -           User Key  (r) = Recorded By, (t) = Taken By, (c) = Cosigned By    Initials Name Provider Type     Yesy Henry Occupational Therapist               Clinical Impression     [facility] Name 06/16/22 1607          Pain Assessment    Pretreatment Pain Rating 0/10 - no pain  -     Posttreatment Pain Rating 0/10 - no pain  -     Pain Intervention(s) Repositioned;Ambulation/increased activity  -[facility] Name 06/16/22 1607          Plan of Care Review    Plan of Care Reviewed With patient  -     Progress no change  -     Outcome Evaluation OT evaluation completed today.  Pt presents with weakness and decreased independence with self care and functional mobility tasks.  Pt mod assist of 2 to sit eob, min assist to stand and min assist to side step to head of his bed using RW.  Pt is expected to benefit from skilled OT to improve his strength and independence with ADL tasks.  -[facility] Name 06/16/22 160          Therapy Assessment/Plan (OT)    Patient/Family Therapy Goal Statement (OT) d/c back to [facility]  -     Rehab Potential (OT) good, to achieve stated therapy goals  -     Criteria for Skilled Therapeutic Interventions Met (OT) yes;skilled treatment is necessary  -     Therapy Frequency (OT) 3 times/wk  -[facility] Name 06/16/22 1601          Therapy Plan Review/Discharge Plan (OT)    Anticipated Discharge  Disposition (OT) inpatient rehabilitation facility;assisted living  -     Row Name 06/16/22 1607          Positioning and Restraints    Pre-Treatment Position in bed  -     Post Treatment Position bed  -AH     In Bed fowlers;call light within reach;encouraged to call for assist  -           User Key  (r) = Recorded By, (t) = Taken By, (c) = Cosigned By    Initials Name Provider Type    Yesy Guevara Occupational Therapist               Outcome Measures     Row Name 06/16/22 1611          How much help from another is currently needed...    Putting on and taking off regular lower body clothing? 2  -AH     Bathing (including washing, rinsing, and drying) 2  -AH     Toileting (which includes using toilet bed pan or urinal) 2  -AH     Putting on and taking off regular upper body clothing 3  -AH     Taking care of personal grooming (such as brushing teeth) 3  -AH     Eating meals 4  -AH     AM-PAC 6 Clicks Score (OT) 16  -AH     Row Name 06/16/22 1547          How much help from another person do you currently need...    Turning from your back to your side while in flat bed without using bedrails? 3  -MS (r) MK (t) MS (c)     Moving from lying on back to sitting on the side of a flat bed without bedrails? 3  -MS (r) MK (t) MS (c)     Moving to and from a bed to a chair (including a wheelchair)? 2  -MS (r) MK (t) MS (c)     Standing up from a chair using your arms (e.g., wheelchair, bedside chair)? 2  -MS (r) MK (t) MS (c)     Climbing 3-5 steps with a railing? 1  -MS (r) MK (t) MS (c)     To walk in hospital room? 2  -MS (r) MK (t) MS (c)     AM-PAC 6 Clicks Score (PT) 13  -MS (r) MK (t)     Highest level of mobility 4 --> Transferred to chair/commode  -MS (r) MK (t)     Row Name 06/16/22 1611 06/16/22 1547       Functional Assessment    Outcome Measure Options AM-PAC 6 Clicks Daily Activity (OT)  - AM-PAC 6 Clicks Basic Mobility (PT)  -MS (r) MK (t) MS (c)          User Key  (r) = Recorded By, (t) = Taken  By, (c) = Cosigned By    Initials Name Provider Type     Yesy Henry Occupational Therapist    MS Ernie Lam, PT Physical Therapist    Pablo Sandoval, PT Student PT Student                Occupational Therapy Education                 Title: PT OT SLP Therapies (In Progress)     Topic: Occupational Therapy (In Progress)     Point: ADL training (Done)     Description:   Instruct learner(s) on proper safety adaptation and remediation techniques during self care or transfers.   Instruct in proper use of assistive devices.              Learning Progress Summary           Patient Acceptance, E,TB, VU by  at 6/16/2022 1612    Comment: Role of OT/POC                   Point: Home exercise program (Not Started)     Description:   Instruct learner(s) on appropriate technique for monitoring, assisting and/or progressing therapeutic exercises/activities.              Learner Progress:  Not documented in this visit.          Point: Precautions (Not Started)     Description:   Instruct learner(s) on prescribed precautions during self-care and functional transfers.              Learner Progress:  Not documented in this visit.          Point: Body mechanics (Not Started)     Description:   Instruct learner(s) on proper positioning and spine alignment during self-care, functional mobility activities and/or exercises.              Learner Progress:  Not documented in this visit.                      User Key     Initials Effective Dates Name Provider Type Discipline     06/16/21 -  Yesy Henry Occupational Therapist OT              OT Recommendation and Plan  Planned Therapy Interventions (OT): activity tolerance training, adaptive equipment training, BADL retraining, patient/caregiver education/training, transfer/mobility retraining, strengthening exercise  Therapy Frequency (OT): 3 times/wk  Plan of Care Review  Plan of Care Reviewed With: patient  Progress: no change  Outcome Evaluation: OT evaluation  completed today.  Pt presents with weakness and decreased independence with self care and functional mobility tasks.  Pt mod assist of 2 to sit eob, min assist to stand and min assist to side step to head of his bed using RW.  Pt is expected to benefit from skilled OT to improve his strength and independence with ADL tasks.     Time Calculation:    Time Calculation- OT     Row Name 06/16/22 1612             Time Calculation- OT    OT Start Time 1343  -      OT Received On 06/16/22  -      OT Goal Re-Cert Due Date 06/26/22  -              Untimed Charges    OT Eval/Re-eval Minutes 40  -AH              Total Minutes    Untimed Charges Total Minutes 40  -AH       Total Minutes 40  -AH            User Key  (r) = Recorded By, (t) = Taken By, (c) = Cosigned By    Initials Name Provider Type    Yesy Guevara Occupational Therapist              Therapy Charges for Today     Code Description Service Date Service Provider Modifiers Qty    [id number] HC OT EVAL LOW COMPLEXITY 3 6/16/2022 Yesy Henry GO 1               Yesy Henry  6/16/2022

## 2022-06-16 NOTE — PLAN OF CARE
Goal Outcome Evaluation:  Plan of Care Reviewed With: patient        Progress: no change  Outcome Evaluation: OT evaluation completed today.  Pt presents with weakness and decreased independence with self care and functional mobility tasks.  Pt mod assist of 2 to sit eob, min assist to stand and min assist to side step to head of his bed using RW.  Pt is expected to benefit from skilled OT to improve his strength and independence with ADL tasks.

## 2022-06-16 NOTE — PLAN OF CARE
Goal Outcome Evaluation:  Plan of Care Reviewed With: (P) patient        Progress: (P) no change  Outcome Evaluation: (P) Pt participated in PT evaluation today. Pt was received supine and was [place]. Pt came from supine to sit mod A x2 and sat EOB x5 min CGA. Pt completed STS min A and stood min A using RW x 5 min. Pt side stepped 2 ft toward HOB min A using RW and sat down. Pt was returned to bed. Pt expected to benefit from skilled PT during this inpatient stay.

## 2022-06-16 NOTE — PLAN OF CARE
Goal Outcome Evaluation:  Plan of Care Reviewed With: patient        Progress: no change  Outcome Evaluation: New admit; transfer from overflow. No acute events to report. VSS. Will continue to monitor.

## 2022-06-16 NOTE — H&P
[facility]      Name:  Remington Rojas   Age:  86 y.o.  Sex:  male  :  1935  MRN:  [id number]   Visit Number:  [id number]  Admission Date:  6/15/2022  Date Of Service:  22  Primary Care Physician:  Karoline Aceves MD    Chief Complaint:     Altered mental status    History Of Presenting Illness:    Patient is a chronically ill 86-year-old male with history significant for Parkinson's disease, hypertension, hypothyroidism and BPH who presents from assisted living facility today with his family for altered mental status.  Patient's son reports that he called him earlier in the day and did not think that his dad was sounding his normal self.  He went to check on him and noticed that he was confused.  Normally his father is alert and oriented however he no longer was aware of the time.  Patient recently underwent cystoscopy on  by Dr. Fuchs due to his BPH.  Unsure exactly what procedure was performed.  Family states that at that time patient was in his normal state of health.  Of note, patient is extremely hard of hearing and is scheduled to get cochlear implants.  Labs significant for negative troponin, glucose 147, sodium 131, bicarb 20, chloride 97, creatinine 1.28, BUN 13.  CRP 4.4.  Lactate lipase and procalcitonin within normal limits.  WBC 24.  Hemoglobin hematocrit and platelets within normal limits.  Urinalysis positive for leukocytes, 31-50 WBC and trace bacteria.  CT abdomen pelvis shows gas within the bladder consistent with either infection or recent instrumentation.  Chest x-ray shows no acute process.  Patient received 1 dose of Rocephin in the emergency room.  Hospital service contacted for admission.    Review Of Systems:    All systems were reviewed and negative except as mentioned in history of presenting illness, assessment and plan.    Past Medical History: Patient  has a past medical history of Acute myocardial  infarction (HCC), BPH (benign prostatic hyperplasia), CAD (coronary artery disease), Elevated cholesterol, GERD without esophagitis, [place] (hard of hearing), Hyperlipidemia, Hypertension, Hypothyroidism, Impaired functional mobility, balance, gait, and endurance, Overactive bladder, Seasonal allergies, and Wears dentures.    Past Surgical History: Patient  has a past surgical history that includes Coronary artery bypass graft (2006); Other surgical history; Other surgical history; Cardiac catheterization; Hernia repair; Mouth surgery; ORIF wrist fracture (Right, 6/22/2018); Cataract extraction w/ intraocular lens implant (Right, 6/21/2021); and Cataract extraction w/ intraocular lens implant (Left, 7/19/2021).    Social History: Patient  reports that he has quit smoking. He has never used smokeless tobacco. He reports that he does not drink alcohol and does not use drugs.    Family History: Patient's family history includes Coronary artery disease in his mother and sister.    Allergies:      Metoprolol    Home Medications:    Prior to Admission Medications     Prescriptions Last Dose Informant Patient Reported? Taking?    finasteride (PROSCAR) 5 MG tablet  Self Yes No    Take 5 mg by mouth daily.    levothyroxine (SYNTHROID, LEVOTHROID) 88 MCG tablet   Yes No    Take 88 mcg by mouth Daily.    lisinopril (PRINIVIL,ZESTRIL) 2.5 MG tablet  Self Yes No    Take 2.5 mg by mouth Daily.    nitroglycerin (NITROSTAT) 0.4 MG SL tablet  Self Yes No    Place 0.4 mg under the tongue Every 5 (Five) Minutes As Needed.    ondansetron (ZOFRAN) 4 MG tablet   No No    Take 1 tablet by mouth Every 6 (Six) Hours As Needed for Nausea or Vomiting.    prednisoLONE acetate (Pred Forte) 1 % ophthalmic suspension   No No    Follow instructions on the After Visit Summary.    prednisoLONE acetate (Pred Forte) 1 % ophthalmic suspension   No No    Follow instructions on the After Visit Summary.        ED Medications:    Medications   sodium chloride  "0.9 % flush 10 mL (has no administration in time range)   sodium chloride 0.9 % bolus 1,000 mL (1,000 mL Intravenous New Bag 6/16/22 0404)   cefTRIAXone (ROCEPHIN) IVPB 2 g/50ml dextrose (premix) (2 g Intravenous New Bag 6/16/22 0404)   sodium chloride 0.9 % bolus 1,000 mL (0 mL Intravenous Stopped 6/16/22 0133)   acetaminophen (TYLENOL) tablet 1,000 mg (1,000 mg Oral Given 6/16/22 0017)   iopamidol (ISOVUE-300) 61 % injection 100 mL (100 mL Intravenous Given 6/16/22 0214)     Vital Signs:  Temp:  [100.2 °F (37.9 °C)] 100.2 °F (37.9 °C)  Heart Rate:  [77-97] 79  Resp:  [16-18] 18  BP: (118-160)/(62-84) 131/63        06/15/22  2319   Weight: 83.9 kg (185 lb)     Body mass index is 28.13 kg/m².    Physical Exam:     Most recent vital Signs: /63   Pulse 79   Temp 100.2 °F (37.9 °C) (Oral)   Resp 18   Ht 172.7 cm (68\")   Wt 83.9 kg (185 lb)   SpO2 93%   BMI 28.13 kg/m²     Physical Exam  Constitutional:       Appearance: Normal appearance.   HENT:      Head: Normocephalic and atraumatic.      Ears:      Comments: Hard of hearing     Mouth/Throat:      Mouth: Mucous membranes are dry.      Pharynx: Oropharynx is clear.   Eyes:      Extraocular Movements: Extraocular movements intact.      Conjunctiva/sclera: Conjunctivae normal.      Pupils: Pupils are equal, round, and reactive to light.   Cardiovascular:      Rate and Rhythm: Normal rate and regular rhythm.      Pulses: Normal pulses.      Heart sounds: Normal heart sounds.   Pulmonary:      Effort: Pulmonary effort is normal. No respiratory distress.      Breath sounds: Normal breath sounds.   Abdominal:      General: Bowel sounds are normal.      Palpations: Abdomen is soft.   Skin:     General: Skin is warm and dry.   Neurological:      General: No focal deficit present.      Mental Status: He is alert. He is disoriented.         Laboratory data:    I have reviewed the labs done in the emergency room.    Results from last 7 days   Lab Units " 06/15/22  2335   SODIUM mmol/L 131*   POTASSIUM mmol/L 4.2   CHLORIDE mmol/L 97*   CO2 mmol/L 20.1*   BUN mg/dL 13   CREATININE mg/dL 1.28*   CALCIUM mg/dL 9.0   BILIRUBIN mg/dL 1.0   ALK PHOS U/L 92   ALT (SGPT) U/L 26   AST (SGOT) U/L 27   GLUCOSE mg/dL 147*     Results from last 7 days   Lab Units 06/15/22  2335   WBC 10*3/mm3 23.68*   HEMOGLOBIN g/dL 15.2   HEMATOCRIT % 44.6   PLATELETS 10*3/mm3 187         Results from last 7 days   Lab Units 06/15/22  2335   TROPONIN T ng/mL <0.010     Results from last 7 days   Lab Units 06/15/22  2335   PROBNP pg/mL 310.8         Results from last 7 days   Lab Units 06/15/22  2335   LIPASE U/L 14         Results from last 7 days   Lab Units 06/16/22  0049   COLOR UA  Yellow   GLUCOSE UA  Negative   KETONES UA  Trace*   LEUKOCYTES UA  Small (1+)*   PH, URINE  6.5   BILIRUBIN UA  Negative   UROBILINOGEN UA  1.0 E.U./dL   RBC UA /HPF 6-12*   WBC UA /HPF 31-50*       Pain Management Panel     Pain Management Panel Latest Ref Rng & Units 6/21/2015    AMPHETAMINES SCREEN, URINE NEGATIVE ng/mL Negative    BARBITURATES SCREEN NEGATIVE ng/mL Negative    BENZODIAZEPINE SCREEN, URINE NEGATIVE ng/mL Negative    COCAINE SCREEN, URINE NEGATIVE ng/mL Negative    METHADONE SCREEN, URINE NEGATIVE ng/mL Negative          EKG:      EKG personally reviewed, sinus rhythm with rate 97 bpm.  No acute ST or T wave changes.    Radiology:    CT Head Without Contrast    Result Date: 6/16/2022  FINAL REPORT TECHNIQUE: null CLINICAL HISTORY: ams COMPARISON: null FINDINGS: CT HEAD WITHOUT IV CONTRAST: COMPARISON: No relevant priors are available for comparison TECHNIQUE: Axial imaging of the head performed from the skull base to the vertex without IV contrast. Coronal reformations obtained. FINDINGS: There is diffuse atrophy. There is no mass, mass effect or midline shift. No abnormal extra-axial fluid collection or intracranial hemorrhage. There are scattered deep white matter changes. No CT evidence  for acute infarction. Visualized paranasal sinuses and mastoids are clear. There are no skull fractures.     IMPRESSION: No CT evidence for an acute intracranial abnormality. Senescent brain changes. Authenticated and Electronically Signed by POLLY Ferreira MD on 06/16/2022 12:31:17 AM    CT Abdomen Pelvis With Contrast    Result Date: 6/16/2022  FINAL REPORT TECHNIQUE: null CLINICAL HISTORY: ams recent  cysto COMPARISON: null FINDINGS: CT of the abdomen and pelvis after administration of IV contrast. Technique: CT from the lung bases through the ischial tuberosities after administration of IV contrast Comparison: None Findings: Normal lung bases. Small hiatal hernia. Normal liver. No masses. The gallbladder is unremarkable by CT. Normal appearing adrenal glands. Normal spleen. No masses. The spleen appears normal in size. Simple right renal cyst. Otherwise normal kidneys. No renal mass. No hydronephrosis. Moderate distention of the rectum with stool. Diverticulosis of the colon, no diverticulitis. No pneumoperitoneum or abscess. No bowel obstruction. Normal appendix. Normal pancreas. No pancreatitis. Normal retroperitoneal structures. No adenopathy. Normal caliber abdominal aorta. Gas within the urinary bladder. This could be secondary to recent instrumentation versus infection. Bilateral L5 spondylolysis. Bilateral inguinal hernias containing only fat.     Impression: Gas within the urinary bladder. This could be secondary to recent instrumentation versus infection. Bilateral inguinal hernias containing only fat. Authenticated and Electronically Signed by Clifford Lee MD on 06/16/2022 03:00:51 AM    XR Chest 1 View    Result Date: 6/16/2022  FINAL REPORT TECHNIQUE: null CLINICAL HISTORY: ams COMPARISON: null FINDINGS: Single view of the chest Comparison: None Findings: Cardiomegaly, no CHF. Otherwise normal heart, lungs and mediastinum. No pneumonia. Status post median sternotomy.     Impression: Cardiomegaly,  no CHF. Authenticated and Electronically Signed by Clifford Lee MD on 06/16/2022 01:12:04 AM      Assessment:    1. Acute metabolic encephalopathy, POA  2. Acute UTI with recent instrumentation, POA  3. CAD  4. BPH  5. Hypertension  6. Hypothyroidism  7. Parkinson's disease  8. Age-related debility    Plan:    We will admit patient to the hospital.  Patient received first dose of Rocephin in the emergency room.  We will continue for total of 7 days.  Follow blood and urine culture.  De-escalate antibiotics as able based on cultures.  No previous urine cultures available in our system to guide current antibiotic.  Suspect that patient is also dehydrated.  Will initiate IV fluids.  Continue home medications.  Further orders clinical course dictates.    Risk Assessment: High  DVT Prophylaxis: Heparin subcu  Code Status: DNR (based on patient's living will)  Diet: Cardiac    Advance Care Planning   ACP discussion was declined by the patient. Patient has an advance directive in EMR which is still valid.            Pablo Holt DO  06/16/22  04:20 EDT    Dictated utilizing Dragon dictation.

## 2022-06-16 NOTE — ED PROVIDER NOTES
"Subjective   86-year-old male presents emergency department with his family.  Patient lives at an assisted living facility he has a past medical history of hypertension hyperlipidemia hypothyroidism gastroesophageal reflux disease and overactive bladder he also has parkinsonism so he has impaired functional gait and mobility.  Patient is also very hard of hearing he is scheduled in the near future to get cochlear implants.  On Monday he had a cystoscopy at an outpatient facility.  Then today when family called to check on him and tell him that he they were going to take him to the cardiologist tomorrow he was not acting right when they went to his apartment he seemed to be out of it did not respond like he normally did so family member thought that he may have a UTI and brought him to the emergency department.  Patient is very hard of hearing he knows who he is and where he is at.      Altered Mental Status  Presenting symptoms: confusion and lethargy    Severity:  Moderate  Most recent episode:  Today  Episode history:  Continuous  Timing:  Constant  Progression:  Unchanged  Chronicity:  New  Associated symptoms: no abdominal pain and no fever        Review of Systems   Constitutional: Negative.  Negative for fever.   HENT: Negative.    Respiratory: Negative.    Cardiovascular: Negative.  Negative for chest pain.   Gastrointestinal: Negative.  Negative for abdominal pain.   Endocrine: Negative.    Genitourinary: Negative.  Negative for dysuria.   Skin: Negative.    Neurological: Negative.    Psychiatric/Behavioral: Positive for confusion.   All other systems reviewed and are negative.      Past Medical History:   Diagnosis Date   • Acute myocardial infarction (HCC)     \"ABOUT 8-10 YEARS AGO\" PATIENT REPORTS   • BPH (benign prostatic hyperplasia)    • CAD (coronary artery disease)    • Elevated cholesterol     REPORTS HAS BEEN ON MEDICATION IN THE PAST   • GERD without esophagitis    • [place] (hard of hearing)    • " "Hyperlipidemia     UNSPECIFIED   • Hypertension    • Hypothyroidism    • Impaired functional mobility, balance, gait, and endurance    • Overactive bladder    • Seasonal allergies    • Wears dentures        Allergies   Allergen Reactions   • Metoprolol Unknown (See Comments)     PATIENT DOES NOT RECALL ALLERGY REACTION OR TYPE, BUT DR BROWN (CARDIOTHORACIC SURGEON) TOLD HIM NOT TO TAKE IT.       Past Surgical History:   Procedure Laterality Date   • CARDIAC CATHETERIZATION      REPORTS \"ABOUT 8-10 YEARS AGO\" HAD 2 STENTS PLACED   • CATARACT EXTRACTION W/ INTRAOCULAR LENS IMPLANT Right 6/21/2021    Procedure: CATARACT PHACO EXTRACTION WITH INTRAOCULAR LENS IMPLANT RIGHT COMLEX W/ MALYUGIN RING;  Surgeon: Td Palmer MD;  Location: [facility] OR;  Service: Ophthalmology;  Laterality: Right;   • CATARACT EXTRACTION W/ INTRAOCULAR LENS IMPLANT Left 7/19/2021    Procedure: CATARACT PHACO EXTRACTION WITH INTRAOCULAR LENS IMPLANT LEFT COMPLEX W/ MALYUGIN RING;  Surgeon: Td Palmer MD;  Location: [facility] OR;  Service: Ophthalmology;  Laterality: Left;   • CORONARY ARTERY BYPASS GRAFT  2006   • HERNIA REPAIR      BILATERAL INGUINAL HERNIA REPAIRS WITH MESH WITHIN 3 YEARS   • MOUTH SURGERY      FULL TEETH EXTRACTION   • ORIF WRIST FRACTURE Right 6/22/2018    Procedure: OPEN REDUCTION INTERNAL FIXATION WITH VOLAR PLATE (SYNTHES), RIGHT WRIST;  Surgeon: Sen Flanagan MD;  Location: [facility] OR;  Service: Orthopedics   • OTHER SURGICAL HISTORY      INGUINAL HERNIA REPAIR BILATERAL   • OTHER SURGICAL HISTORY      PREVIOUS STENT PLACEMENT       Family History   Problem Relation Age of Onset   • Coronary artery disease Mother    • Coronary artery disease Sister        Social History     Socioeconomic History   • Marital status:    Tobacco Use   • Smoking status: Former Smoker   • Smokeless tobacco: Never Used   Substance and Sexual Activity   • Alcohol use: No   • Drug use: No   • Sexual activity: Defer "           Objective   Physical Exam  Vitals reviewed.   Constitutional:       Appearance: He is obese. He is ill-appearing.   HENT:      Head: Normocephalic and atraumatic.      Mouth/Throat:      Mouth: Mucous membranes are moist.   Eyes:      Extraocular Movements: Extraocular movements intact.      Pupils: Pupils are equal, round, and reactive to light.   Cardiovascular:      Rate and Rhythm: Regular rhythm.   Pulmonary:      Effort: Pulmonary effort is normal.   Abdominal:      Palpations: Abdomen is soft.   Musculoskeletal:         General: Normal range of motion.      Cervical back: Normal range of motion.   Skin:     General: Skin is warm.      Capillary Refill: Capillary refill takes less than 2 seconds.   Neurological:      Mental Status: He is alert. He is confused.         Procedures           ED Course  ED Course as of 06/16/22 2019   Thu Jun 16, 2022   0037 EKG interpretation time is 2327 sinus rhythm 97 bpm QRS duration 100 QT is 356 QTC is 411 no evidence of acute ST elevation. [MH]      ED Course User Index  [MH] Lynsey Howell, DO                                                 MDM  Number of Diagnoses or Management Options  Urinary tract infection without hematuria, site unspecified: new and requires workup     Amount and/or Complexity of Data Reviewed  Clinical lab tests: ordered and reviewed  Tests in the radiology section of CPT®: reviewed and ordered  Tests in the medicine section of CPT®: reviewed and ordered  Discuss the patient with other providers: yes  Independent visualization of images, tracings, or specimens: yes    Risk of Complications, Morbidity, and/or Mortality  Presenting problems: high  Diagnostic procedures: high  Management options: high        Final diagnoses:   Urinary tract infection without hematuria, site unspecified       ED Disposition  ED Disposition     ED Disposition   Decision to Admit    Condition   --    Comment   Level of Care: Telemetry [5]   Diagnosis:  Urinary tract infection without hematuria, site unspecified [[id number]]   Admitting Physician: RANDY DIXON [[id number]]   Attending Physician: RANDY DIXON [[id number]]   Isolate for COVID?: No [0]   Certification: I Certify That Inpatient Hospital Services Are Medically Necessary For Greater Than 2 Midnights               No follow-up provider specified.       Medication List      No changes were made to your prescriptions during this visit.          Lynsey Howell,   06/16/22 2019

## 2022-06-17 LAB
ANION GAP SERPL CALCULATED.3IONS-SCNC: 11.2 MMOL/L (ref 5–15)
BASOPHILS # BLD AUTO: 0.06 10*3/MM3 (ref 0–0.2)
BASOPHILS NFR BLD AUTO: 0.3 % (ref 0–1.5)
BUN SERPL-MCNC: 10 MG/DL (ref 8–23)
BUN/CREAT SERPL: 10.5 (ref 7–25)
CALCIUM SPEC-SCNC: 7.8 MG/DL (ref 8.6–10.5)
CHLORIDE SERPL-SCNC: 105 MMOL/L (ref 98–107)
CO2 SERPL-SCNC: 16.8 MMOL/L (ref 22–29)
CREAT SERPL-MCNC: 0.95 MG/DL (ref 0.76–1.27)
DEPRECATED RDW RBC AUTO: 40.1 FL (ref 37–54)
EGFRCR SERPLBLD CKD-EPI 2021: 78.9 ML/MIN/1.73
EOSINOPHIL # BLD AUTO: 0.05 10*3/MM3 (ref 0–0.4)
EOSINOPHIL NFR BLD AUTO: 0.3 % (ref 0.3–6.2)
ERYTHROCYTE [DISTWIDTH] IN BLOOD BY AUTOMATED COUNT: 11.6 % (ref 12.3–15.4)
GLUCOSE SERPL-MCNC: 111 MG/DL (ref 65–99)
HCT VFR BLD AUTO: 38 % (ref 37.5–51)
HGB BLD-MCNC: 12.6 G/DL (ref 13–17.7)
IMM GRANULOCYTES # BLD AUTO: 0.16 10*3/MM3 (ref 0–0.05)
IMM GRANULOCYTES NFR BLD AUTO: 0.8 % (ref 0–0.5)
LYMPHOCYTES # BLD AUTO: 2.43 10*3/MM3 (ref 0.7–3.1)
LYMPHOCYTES NFR BLD AUTO: 12.8 % (ref 19.6–45.3)
MCH RBC QN AUTO: 31.4 PG (ref 26.6–33)
MCHC RBC AUTO-ENTMCNC: 33.2 G/DL (ref 31.5–35.7)
MCV RBC AUTO: 94.8 FL (ref 79–97)
MONOCYTES # BLD AUTO: 1.21 10*3/MM3 (ref 0.1–0.9)
MONOCYTES NFR BLD AUTO: 6.4 % (ref 5–12)
NEUTROPHILS NFR BLD AUTO: 15.03 10*3/MM3 (ref 1.7–7)
NEUTROPHILS NFR BLD AUTO: 79.4 % (ref 42.7–76)
NRBC BLD AUTO-RTO: 0 /100 WBC (ref 0–0.2)
PLATELET # BLD AUTO: 152 10*3/MM3 (ref 140–450)
PMV BLD AUTO: 10 FL (ref 6–12)
POTASSIUM SERPL-SCNC: 4 MMOL/L (ref 3.5–5.2)
RBC # BLD AUTO: 4.01 10*6/MM3 (ref 4.14–5.8)
SODIUM SERPL-SCNC: 133 MMOL/L (ref 136–145)
WBC NRBC COR # BLD: 18.94 10*3/MM3 (ref 3.4–10.8)

## 2022-06-17 PROCEDURE — 85025 COMPLETE CBC W/AUTO DIFF WBC: CPT | Performed by: STUDENT IN AN ORGANIZED HEALTH CARE EDUCATION/TRAINING PROGRAM

## 2022-06-17 PROCEDURE — 97530 THERAPEUTIC ACTIVITIES: CPT

## 2022-06-17 PROCEDURE — 25010000002 HEPARIN (PORCINE) PER 1000 UNITS: Performed by: INTERNAL MEDICINE

## 2022-06-17 PROCEDURE — 80048 BASIC METABOLIC PNL TOTAL CA: CPT | Performed by: STUDENT IN AN ORGANIZED HEALTH CARE EDUCATION/TRAINING PROGRAM

## 2022-06-17 PROCEDURE — 99232 SBSQ HOSP IP/OBS MODERATE 35: CPT | Performed by: STUDENT IN AN ORGANIZED HEALTH CARE EDUCATION/TRAINING PROGRAM

## 2022-06-17 PROCEDURE — 25010000002 CEFTRIAXONE SODIUM-DEXTROSE 1-3.74 GM-%(50ML) RECONSTITUTED SOLUTION: Performed by: INTERNAL MEDICINE

## 2022-06-17 RX ADMIN — Medication 1 CAPSULE: at 08:29

## 2022-06-17 RX ADMIN — SENNOSIDES AND DOCUSATE SODIUM 2 TABLET: 50; 8.6 TABLET ORAL at 08:29

## 2022-06-17 RX ADMIN — HEPARIN SODIUM 5000 UNITS: 5000 INJECTION INTRAVENOUS; SUBCUTANEOUS at 21:40

## 2022-06-17 RX ADMIN — HEPARIN SODIUM 5000 UNITS: 5000 INJECTION INTRAVENOUS; SUBCUTANEOUS at 05:05

## 2022-06-17 RX ADMIN — Medication 10 ML: at 21:41

## 2022-06-17 RX ADMIN — LEVOTHYROXINE SODIUM 88 MCG: 88 TABLET ORAL at 08:29

## 2022-06-17 RX ADMIN — FINASTERIDE 5 MG: 5 TABLET, FILM COATED ORAL at 08:29

## 2022-06-17 RX ADMIN — HEPARIN SODIUM 5000 UNITS: 5000 INJECTION INTRAVENOUS; SUBCUTANEOUS at 14:52

## 2022-06-17 RX ADMIN — CEFTRIAXONE 1 G: 1 INJECTION, SOLUTION INTRAVENOUS at 03:34

## 2022-06-17 NOTE — PLAN OF CARE
Goal Outcome Evaluation:  Plan of Care Reviewed With: patient        Progress: improving  Outcome Evaluation: Patient VSS. Patient had no acute events today. Patient showed no signs of confusion today. Will continue to monitor.

## 2022-06-17 NOTE — PLAN OF CARE
Goal Outcome Evaluation:  Plan of Care Reviewed With: patient        Progress: improving   VSS. Pt receiving antibiotics. He has rested most of the night, no complaints of pain. VSS. NSR on monitor. Will continue to monitor.

## 2022-06-17 NOTE — PLAN OF CARE
Goal Outcome Evaluation:  Plan of Care Reviewed With: (P) patient        Progress: no change  Outcome Evaluation: (P) Pt participated well in PT treatment today. Pt supine in bed upon entrance. Pt rolled bilaterally max A and frequent VCs to put brief on. Pt came from supine to sit EOB mod A with frequent VCs. Pt sat EOB x5 min CGA. Pt completed STS min A, frequent VCs and was able to ambulate 30 ft using RW min A. Pt continued to require frequent VCs to keep stepping and keep close to RW. Pt is [place] and demonstrated festinating gait pattern. Pt returned to supine and left with son at bedside. Progress per POC and pt tolerance.

## 2022-06-17 NOTE — PLAN OF CARE
Goal Outcome Evaluation:  Plan of Care Reviewed With: patient        Progress: improving  Outcome Evaluation: Pt seen for OT treatment this date. Pt supine in bed with head of bed elevated.  O2 sat was fluctuating in 80s but later found sensor sideways on finger and not accurate reading.  O2 at 95% with O2 placed 3L.  Rolling left and right in bed with use of bed rails with min A & vcs for donning brief at max A-dependent.  Supine to sit EOB mod A x 1 with constant vcs.  Sitting EOB 5 mins CGA-SBA.  Sit to stand with min A & constant vcs at FWW.  Pt functional mobility with FWW approximately 30 ft with FWW and constant vcs for each step of functional mobility.  Pt tends to walk too far from walker and R LE gets stuck frequently with pt unable to move.  Verbal and tactile cues to resume mobility.  Transfer to bed with min A & vcs and sit to supine with mod A & vcs.  Pt extremely [place] of has great difficulty following instructions due to hearing deficit.  Pt reports no pain after OT completion.  Pt sitting up in bed with LE slightly elevated, call light by right hand, bed alarm on, O2 at 2L with O2 sat at 95%.  Son at bedside.

## 2022-06-17 NOTE — THERAPY TREATMENT NOTE
"Patient Name: Remington Rojas  : 1935    MRN: [id number]                              Today's Date: 2022       Admit Date: 6/15/2022    Visit Dx:     ICD-10-CM ICD-9-CM   1. Urinary tract infection without hematuria, site unspecified  N39.0 599.0     Patient Active Problem List   Diagnosis   • Preop examination   • Fever   • Nuclear sclerotic cataract of right eye   • Nuclear sclerotic cataract of left eye   • Urinary tract infection without hematuria     Past Medical History:   Diagnosis Date   • Acute myocardial infarction (HCC)     \"ABOUT 8-10 YEARS AGO\" PATIENT REPORTS   • BPH (benign prostatic hyperplasia)    • CAD (coronary artery disease)    • Elevated cholesterol     REPORTS HAS BEEN ON MEDICATION IN THE PAST   • GERD without esophagitis    • [place] (hard of hearing)    • Hyperlipidemia     UNSPECIFIED   • Hypertension    • Hypothyroidism    • Impaired functional mobility, balance, gait, and endurance    • Overactive bladder    • Seasonal allergies    • Wears dentures      Past Surgical History:   Procedure Laterality Date   • CARDIAC CATHETERIZATION      REPORTS \"ABOUT 8-10 YEARS AGO\" HAD 2 STENTS PLACED   • CATARACT EXTRACTION W/ INTRAOCULAR LENS IMPLANT Right 2021    Procedure: CATARACT PHACO EXTRACTION WITH INTRAOCULAR LENS IMPLANT RIGHT COMLEX W/ MALYUGIN RING;  Surgeon: Td Palmer MD;  Location: [facility] OR;  Service: Ophthalmology;  Laterality: Right;   • CATARACT EXTRACTION W/ INTRAOCULAR LENS IMPLANT Left 2021    Procedure: CATARACT PHACO EXTRACTION WITH INTRAOCULAR LENS IMPLANT LEFT COMPLEX W/ MALYUGIN RING;  Surgeon: Td Palmer MD;  Location: [facility] OR;  Service: Ophthalmology;  Laterality: Left;   • CORONARY ARTERY BYPASS GRAFT     • HERNIA REPAIR      BILATERAL INGUINAL HERNIA REPAIRS WITH MESH WITHIN 3 YEARS   • MOUTH SURGERY      FULL TEETH EXTRACTION   • ORIF WRIST FRACTURE Right 2018    Procedure: OPEN REDUCTION INTERNAL FIXATION WITH VOLAR " PLATE (SYNTHES), RIGHT WRIST;  Surgeon: Sen Flanagan MD;  Location: [facility];  Service: Orthopedics   • OTHER SURGICAL HISTORY      INGUINAL HERNIA REPAIR BILATERAL   • OTHER SURGICAL HISTORY      PREVIOUS STENT PLACEMENT      General Information     Row Name 06/17/22 1510          Physical Therapy Time and Intention    Document Type therapy note (daily note) (P)   -     Mode of Treatment physical therapy (P)   -     Row Name 06/17/22 1510          General Information    Patient Profile Reviewed yes (P)   -           User Key  (r) = Recorded By, (t) = Taken By, (c) = Cosigned By    Initials Name Provider Type    Pablo Sandoval, PT Student PT Student               Mobility     Row Name 06/17/22 1510          Bed Mobility    Bed Mobility rolling left;rolling right;scooting/bridging;supine-sit;sit-supine (P)   -     Rolling Left [place] (Bed Mobility) verbal cues;minimum assist (75% patient effort) (P)   -MK     Rolling Right [place] (Bed Mobility) verbal cues;minimum assist (75% patient effort) (P)   -MK     Scooting/Bridging [place] (Bed Mobility) moderate assist (50% patient effort);verbal cues (P)   -MK     Supine-Sit [place] (Bed Mobility) moderate assist (50% patient effort);verbal cues (P)   -MK     Sit-Supine [place] (Bed Mobility) moderate assist (50% patient effort);verbal cues (P)   -     Assistive Device (Bed Mobility) bed rails;head of bed elevated (P)   -     Row Name 06/17/22 1510          Sit-Stand Transfer    Sit-Stand [place] (Transfers) minimum assist (75% patient effort);verbal cues (P)   -     Assistive Device (Sit-Stand Transfers) walker, front-wheeled (P)   -     Row Name 06/17/22 1510          Gait/Stairs (Locomotion)    [place] Level (Gait) minimum assist (75% patient effort) (P)   -     Assistive Device (Gait) walker, front-wheeled (P)   -MK     Distance in Feet (Gait) 30 ft (P)   -MK     Deviations/Abnormal Patterns (Gait)  festinating/shuffling (P)   -           User Key  (r) = Recorded By, (t) = Taken By, (c) = Cosigned By    Initials Name Provider Type    Pablo Sandoval, PT Student PT Student               Obj/Interventions     Row Name 06/17/22 1511          Balance    Balance Assessment sitting static balance;sitting dynamic balance;sit to stand dynamic balance;standing static balance;standing dynamic balance (P)   -     Static Sitting Balance standby assist;verbal cues;contact guard (P)   -     Dynamic Sitting Balance verbal cues;contact guard (P)   -     Position, Sitting Balance unsupported;sitting edge of bed (P)   -MK     Sit to Stand Dynamic Balance minimal assist;verbal cues (P)   -MK     Static Standing Balance minimal assist;verbal cues (P)   -     Dynamic Standing Balance minimal assist;verbal cues (P)   -     Position/Device Used, Standing Balance walker, front-wheeled;supported (P)   -MK     Balance Interventions sitting;standing;sit to stand (P)   -           User Key  (r) = Recorded By, (t) = Taken By, (c) = Cosigned By    Initials Name Provider Type    Pablo Sandoval, PT Student PT Student               Goals/Plan    No documentation.                Clinical Impression     Row Name 06/17/22 1512          Pain    Pretreatment Pain Rating 0/10 - no pain (P)   -MK     Posttreatment Pain Rating 0/10 - no pain (P)   -     Additional Documentation Pain Scale: Numbers Pre/Post-Treatment (Group) (P)   -     Row Name 06/17/22 1512          Plan of Care Review    Plan of Care Reviewed With patient (P)   -     Outcome Evaluation Pt participated well in PT treatment today. Pt supine in bed upon entrance. Pt rolled bilaterally max A and frequent VCs to put brief on. Pt came from supine to sit EOB mod A with frequent VCs. Pt sat EOB x5 min CGA. Pt completed STS min A, frequent VCs and was able to ambulate 30 ft using RW min A. Pt continued to require frequent VCs to keep stepping and keep close to RW. Pt  is [place] and demonstrated festinating gait pattern. Pt returned to supine and left with son at bedside. Progress per POC and pt tolerance. (P)   -MK     Row Name 06/17/22 1512          Vital Signs    Pre SpO2 (%) 84 (P)   -MK     O2 Delivery Pre Treatment room air (P)   -MK     Intra SpO2 (%) 95 (P)   3 L  -MK     O2 Delivery Intra Treatment supplemental O2 (P)   -MK     Post SpO2 (%) 95 (P)   -MK     O2 Delivery Post Treatment supplemental O2 (P)   -MK     Pre Patient Position Supine (P)   -MK     Intra Patient Position Standing (P)   -MK     Post Patient Position Supine (P)   -MK     Row Name 06/17/22 1512          Positioning and Restraints    Pre-Treatment Position in bed (P)   -MK     Post Treatment Position bed (P)   -MK     In Bed notified nsg;call light within reach;encouraged to call for assist;exit alarm on;with family/caregiver;side rails up x2 (P)   -MK           User Key  (r) = Recorded By, (t) = Taken By, (c) = Cosigned By    Initials Name Provider Type    Pablo Sandoval, PT Student PT Student               Outcome Measures     Row Name 06/17/22 1517          How much help from another person do you currently need...    Turning from your back to your side while in flat bed without using bedrails? 3 (P)   -MK     Moving from lying on back to sitting on the side of a flat bed without bedrails? 3 (P)   -MK     Moving to and from a bed to a chair (including a wheelchair)? 2 (P)   -MK     Standing up from a chair using your arms (e.g., wheelchair, bedside chair)? 2 (P)   -MK     Climbing 3-5 steps with a railing? 1 (P)   -MK     To walk in hospital room? 2 (P)   -MK     AM-PAC 6 Clicks Score (PT) 13 (P)   -MK     Highest level of mobility 4 --> Transferred to chair/commode (P)   -     Row Name 06/17/22 1517 06/17/22 1508       Functional Assessment    Outcome Measure Options AM-PAC 6 Clicks Basic Mobility (PT) (P)   -MK AM-PAC 6 Clicks Daily Activity (OT)  -RM          User Key  (r) = Recorded By, (t) =  Taken By, (c) = Cosigned By    Initials Name Provider Type     Kasie Rivas, OT Occupational Therapist    Pablo Sandoval, PT Student PT Student                             Physical Therapy Education                 Title: PT OT SLP Therapies (In Progress)     Topic: Physical Therapy (In Progress)     Point: Mobility training (Done)     Learning Progress Summary           Patient Acceptance, E, VU by  at 6/17/2022 1518    Acceptance, E, VU by  at 6/16/2022 1548                   Point: Home exercise program (Not Started)     Learner Progress:  Not documented in this visit.          Point: Body mechanics (Done)     Learning Progress Summary           Patient Acceptance, E, VU by  at 6/17/2022 1518                   Point: Precautions (Not Started)     Learner Progress:  Not documented in this visit.                      User Key     Initials Effective Dates Name Provider Type Discipline     05/11/22 -  Pablo Gaspar, PT Student PT Student PT              PT Recommendation and Plan  Planned Therapy Interventions (PT): balance training, bed mobility training, gait training, home exercise program, strengthening  Plan of Care Reviewed With: (P) patient  Progress: no change  Outcome Evaluation: (P) Pt participated well in PT treatment today. Pt supine in bed upon entrance. Pt rolled bilaterally max A and frequent VCs to put brief on. Pt came from supine to sit EOB mod A with frequent VCs. Pt sat EOB x5 min CGA. Pt completed STS min A, frequent VCs and was able to ambulate 30 ft using RW min A. Pt continued to require frequent VCs to keep stepping and keep close to RW. Pt is [place] and demonstrated festinating gait pattern. Pt returned to supine and left with son at bedside. Progress per POC and pt tolerance.     Time Calculation:    PT Charges     Row Name 06/17/22 1518             Time Calculation    Start Time 1353 (P)   -      PT Received On 06/17/22 (P)   -      PT Goal Re-Cert Due Date 06/26/22 (P)    -MK              Timed Charges    33257 - PT Therapeutic Activity Minutes 30 (P)   -MK              Total Minutes    Timed Charges Total Minutes 30 (P)   -MK       Total Minutes 30 (P)   -MK            User Key  (r) = Recorded By, (t) = Taken By, (c) = Cosigned By    Initials Name Provider Type    Pablo Sandoval, PT Student PT Student              Therapy Charges for Today     Code Description Service Date Service Provider Modifiers Qty    74656427557 HC PT EVAL LOW COMPLEXITY 3 6/16/2022 Pablo Gaspar, PT Student GP 1    [id number]  PT THERAPEUTIC ACT EA 15 MIN 6/17/2022 Pablo Gaspar, PT Student GP 2          PT G-Codes  Outcome Measure Options: (P) AM-PAC 6 Clicks Basic Mobility (PT)  AM-PAC 6 Clicks Score (PT): (P) 13  AM-PAC 6 Clicks Score (OT): 15    Pablo Gaspar PT Student  6/17/2022

## 2022-06-17 NOTE — PROGRESS NOTES
[facility]    Name:  Remington Rojas   Age:  86 y.o.  Sex:  male  :  1935  MRN:  [id number]   Visit Number:  [id number]  Admission Date:  6/15/2022  Date Of Service:  22  Primary Care Physician:  Karoline Aceves MD     LOS: 1 day :    Chief Complaint:      Altered Mental Status    Subjective:    Patient seen and examined at bedside. Chart reviewed and events noted. Patient states that he is feeling better. He denies any chest pain, abdominal pain, or dyspnea. Patient's son at bedside. All questions and concerns answered and addressed.    Hospital Course:    chronically ill 86-year-old male with history significant for Parkinson's disease, hypertension, hypothyroidism and BPH who presents from assisted living facility today with his family for altered mental status.  Patient's son reports that he called him earlier in the day and did not think that his dad was sounding his normal self.  He went to check on him and noticed that he was confused.  Normally his father is alert and oriented however he no longer was aware of the time.  Patient recently underwent cystoscopy on  by Dr. Fuchs due to his BPH.  Unsure exactly what procedure was performed.  Family states that at that time patient was in his normal state of health.  Of note, patient is extremely hard of hearing and is scheduled to get cochlear implants.  Labs significant for negative troponin, glucose 147, sodium 131, bicarb 20, chloride 97, creatinine 1.28, BUN 13.  CRP 4.4.  Lactate lipase and procalcitonin within normal limits.  WBC 24.  Hemoglobin hematocrit and platelets within normal limits.  Urinalysis positive for leukocytes, 31-50 WBC and trace bacteria.  CT abdomen pelvis shows gas within the bladder consistent with either infection or recent instrumentation.  Chest x-ray shows no acute process.  Patient received 1 dose of Rocephin in the emergency room.    Patient was  admitted to the hospital and continued on Rocephin. Urine culture with gram negative bacillus growth thus far. Mentation has improved.    Review of Systems:     All systems were reviewed and negative except as mentioned in subjective, assessment and plan.    Vital Signs:    Temp:  [97.5 °F (36.4 °C)-99 °F (37.2 °C)] 98.1 °F (36.7 °C)  Heart Rate:  [73-86] 80  Resp:  [16-19] 18  BP: (107-160)/(50-83) 128/54    Intake and output:    I/O last 3 completed shifts:  In: 3390 [P.O.:240; I.V.:2100; IV Piggyback:1050]  Out: 1500 [Urine:1500]  I/O this shift:  In: -   Out: 425 [Urine:425]    Physical Examination:    General Appearance:  Alert and cooperative. Sitting up in bed, in NAD.   Head:  Atraumatic and normocephalic.   Eyes: Conjunctivae and sclerae normal, no icterus. No pallor.           Lungs:   Breath sounds heard bilaterally equally.  No wheezing or crackles.    Heart:  Normal S1 and S2, no murmur, no gallop, no rub. No JVD.   Abdomen:   Normal bowel sounds, no masses, no organomegaly. Soft, nontender, nondistended, no rebound tenderness.   Extremities: Supple, no edema, no cyanosis, no clubbing.   Skin: No bleeding or rash.   Neurologic: Alert and oriented x 3. No facial asymmetry. Moves all four limbs. No tremors.      Laboratory results:    Results from last 7 days   Lab Units 06/17/22  0642 06/16/22  0553 06/15/22  2335   SODIUM mmol/L 133* 136 131*   POTASSIUM mmol/L 4.0 4.2 4.2   CHLORIDE mmol/L 105 105 97*   CO2 mmol/L 16.8* 17.0* 20.1*   BUN mg/dL 10 10 13   CREATININE mg/dL 0.95 1.06 1.28*   CALCIUM mg/dL 7.8* 8.1* 9.0   BILIRUBIN mg/dL  --   --  1.0   ALK PHOS U/L  --   --  92   ALT (SGPT) U/L  --   --  26   AST (SGOT) U/L  --   --  27   GLUCOSE mg/dL 111* 126* 147*     Results from last 7 days   Lab Units 06/17/22  0642 06/16/22  0553 06/15/22  2335   WBC 10*3/mm3 18.94* 23.74* 23.68*   HEMOGLOBIN g/dL 12.6* 13.6 15.2   HEMATOCRIT % 38.0 41.0 44.6   PLATELETS 10*3/mm3 152 168 187         Results from  last 7 days   Lab Units 06/15/22  2335   TROPONIN T ng/mL <0.010     Results from last 7 days   Lab Units 06/16/22  0049 06/15/22  2350 06/15/22  2335   BLOODCX   --  No growth at 24 hours No growth at 24 hours   URINECX  >100,000 CFU/mL Gram Negative Bacilli*  --   --          I have reviewed the patient's laboratory results.    Radiology results:    CT Head Without Contrast    Result Date: 6/16/2022  FINAL REPORT TECHNIQUE: null CLINICAL HISTORY: ams COMPARISON: null FINDINGS: CT HEAD WITHOUT IV CONTRAST: COMPARISON: No relevant priors are available for comparison TECHNIQUE: Axial imaging of the head performed from the skull base to the vertex without IV contrast. Coronal reformations obtained. FINDINGS: There is diffuse atrophy. There is no mass, mass effect or midline shift. No abnormal extra-axial fluid collection or intracranial hemorrhage. There are scattered deep white matter changes. No CT evidence for acute infarction. Visualized paranasal sinuses and mastoids are clear. There are no skull fractures.     Impression: IMPRESSION: No CT evidence for an acute intracranial abnormality. Senescent brain changes. Authenticated and Electronically Signed by POLLY Ferreira MD on 06/16/2022 12:31:17 AM    CT Abdomen Pelvis With Contrast    Result Date: 6/16/2022  FINAL REPORT TECHNIQUE: null CLINICAL HISTORY: ams recent  cysto COMPARISON: null FINDINGS: CT of the abdomen and pelvis after administration of IV contrast. Technique: CT from the lung bases through the ischial tuberosities after administration of IV contrast Comparison: None Findings: Normal lung bases. Small hiatal hernia. Normal liver. No masses. The gallbladder is unremarkable by CT. Normal appearing adrenal glands. Normal spleen. No masses. The spleen appears normal in size. Simple right renal cyst. Otherwise normal kidneys. No renal mass. No hydronephrosis. Moderate distention of the rectum with stool. Diverticulosis of the colon, no diverticulitis.  No pneumoperitoneum or abscess. No bowel obstruction. Normal appendix. Normal pancreas. No pancreatitis. Normal retroperitoneal structures. No adenopathy. Normal caliber abdominal aorta. Gas within the urinary bladder. This could be secondary to recent instrumentation versus infection. Bilateral L5 spondylolysis. Bilateral inguinal hernias containing only fat.     Impression: Impression: Gas within the urinary bladder. This could be secondary to recent instrumentation versus infection. Bilateral inguinal hernias containing only fat. Authenticated and Electronically Signed by Clifford Lee MD on 06/16/2022 03:00:51 AM    XR Chest 1 View    Result Date: 6/16/2022  FINAL REPORT TECHNIQUE: null CLINICAL HISTORY: ams COMPARISON: null FINDINGS: Single view of the chest Comparison: None Findings: Cardiomegaly, no CHF. Otherwise normal heart, lungs and mediastinum. No pneumonia. Status post median sternotomy.     Impression: Impression: Cardiomegaly, no CHF. Authenticated and Electronically Signed by Clifford Lee MD on 06/16/2022 01:12:04 AM    I have reviewed the patient's radiology reports.    Medication Review:     I have reviewed the patient's active and prn medications.     Problem List:      Urinary tract infection without hematuria      Assessment:    1. Acute metabolic encephalopathy, POA  2. Acute Gram Negative Bacillus UTI with recent instrumentation, POA  3. CAD  4. BPH  5. Hypertension  6. Hypothyroidism  7. Parkinson's disease  8. Age-related debility    Plan:    -Urine culture with gram negative bacillus  -Continue with Rocephin. Will need total of 10 day of antibiotic therapy  -Will titrate antibiotics to urine culture results  -Received 24 hours of IV fluid resuscitation. Discontinue IVF at this time  -Continue home medications as warranted  -Continue PT/OT  -Patient will be discharged back to [facility] when medically stable. Anticipate in the next 24-48 hours    DVT  Prophylaxis: Heparin  Code Status: DNR  Diet: Cardiac  Discharge Plan: Home at [facility] in the next 24-48 hours    Laquita Petit DO  06/17/22  14:51 EDT    Dictated utilizing Dragon dictation.

## 2022-06-17 NOTE — THERAPY TREATMENT NOTE
"Patient Name: Remington Rojas  : 1935    MRN: [id number]                              Today's Date: 2022       Admit Date: 6/15/2022    Visit Dx:     ICD-10-CM ICD-9-CM   1. Urinary tract infection without hematuria, site unspecified  N39.0 599.0     Patient Active Problem List   Diagnosis   • Preop examination   • Fever   • Nuclear sclerotic cataract of right eye   • Nuclear sclerotic cataract of left eye   • Urinary tract infection without hematuria     Past Medical History:   Diagnosis Date   • Acute myocardial infarction (HCC)     \"ABOUT 8-10 YEARS AGO\" PATIENT REPORTS   • BPH (benign prostatic hyperplasia)    • CAD (coronary artery disease)    • Elevated cholesterol     REPORTS HAS BEEN ON MEDICATION IN THE PAST   • GERD without esophagitis    • [place] (hard of hearing)    • Hyperlipidemia     UNSPECIFIED   • Hypertension    • Hypothyroidism    • Impaired functional mobility, balance, gait, and endurance    • Overactive bladder    • Seasonal allergies    • Wears dentures      Past Surgical History:   Procedure Laterality Date   • CARDIAC CATHETERIZATION      REPORTS \"ABOUT 8-10 YEARS AGO\" HAD 2 STENTS PLACED   • CATARACT EXTRACTION W/ INTRAOCULAR LENS IMPLANT Right 2021    Procedure: CATARACT PHACO EXTRACTION WITH INTRAOCULAR LENS IMPLANT RIGHT COMLEX W/ MALYUGIN RING;  Surgeon: Td Palmer MD;  Location: [facility];  Service: Ophthalmology;  Laterality: Right;   • CATARACT EXTRACTION W/ INTRAOCULAR LENS IMPLANT Left 2021    Procedure: CATARACT PHACO EXTRACTION WITH INTRAOCULAR LENS IMPLANT LEFT COMPLEX W/ MALYUGIN RING;  Surgeon: Td Palmer MD;  Location: [facility];  Service: Ophthalmology;  Laterality: Left;   • CORONARY ARTERY BYPASS GRAFT     • HERNIA REPAIR      BILATERAL INGUINAL HERNIA REPAIRS WITH MESH WITHIN 3 YEARS   • MOUTH SURGERY      FULL TEETH EXTRACTION   • ORIF WRIST FRACTURE Right 2018    Procedure: OPEN REDUCTION INTERNAL FIXATION WITH VOLAR " PLATE (SYNTHES), RIGHT WRIST;  Surgeon: Sen Flanagan MD;  Location: [facility];  Service: Orthopedics   • OTHER SURGICAL HISTORY      INGUINAL HERNIA REPAIR BILATERAL   • OTHER SURGICAL HISTORY      PREVIOUS STENT PLACEMENT      General Information     Row Name 06/17/22 1451          OT Time and Intention    Document Type therapy note (daily note)  -RM     Mode of Treatment occupational therapy  -RM     Row Name 06/17/22 1451          General Information    Patient Profile Reviewed yes  -RM           User Key  (r) = Recorded By, (t) = Taken By, (c) = Cosigned By    Initials Name Provider Type    RM Kasie Rivas, ELY Occupational Therapist                 Mobility/ADL's     Row Name 06/17/22 1451          Bed Mobility    Bed Mobility rolling left;rolling right;scooting/bridging;supine-sit;sit-supine  -RM     Rolling Left [place] (Bed Mobility) verbal cues;minimum assist (75% patient effort);1 person assist  -RM     Rolling Right [place] (Bed Mobility) verbal cues;minimum assist (75% patient effort);1 person assist  -RM     Scooting/Bridging [place] (Bed Mobility) moderate assist (50% patient effort);verbal cues;1 person assist  -RM     Supine-Sit [place] (Bed Mobility) moderate assist (50% patient effort);verbal cues;1 person assist  -RM     Sit-Supine [place] (Bed Mobility) moderate assist (50% patient effort);verbal cues;1 person assist  -RM     Bed Mobility, Safety Issues decreased use of arms for pushing/pulling;decreased use of legs for bridging/pushing;other (see comments)  EXTREMELY HARD OF HEARING  -RM     Assistive Device (Bed Mobility) bed rails;head of bed elevated  -RM     Comment, (Bed Mobility) LIMITED BY "[employer]  -RM     Row Name 06/17/22 1451          Transfers    Transfers sit-stand transfer  -RM     Sit-Stand [place] (Transfers) minimum assist (75% patient effort);verbal cues;1 person assist  -RM     Row Name 06/17/22 1451          Sit-Stand Transfer    Assistive Device  (Sit-Stand Transfers) walker, front-wheeled  -RM     Row Name 06/17/22 1451          Functional Mobility    Functional Mobility- Ind. Level minimum assist (75% patient effort);verbal cues required  -RM     Functional Mobility- Device walker, front-wheeled  -RM     Functional Mobility-Distance (Feet) 30  -RM     Functional Mobility- Safety Issues balance decreased during turns;sequencing ability decreased;step length decreased;weight-shifting ability decreased;supplemental O2;other (see comments)  steps too far from walker  -RM     Functional Mobility- Comment Constant verbal cues for mobility and assist with AD placement, stepping closer to walker, and for moving RLE during walking  -RM     Row Name 06/17/22 1451          Activities of Daily Living    BADL Assessment/Intervention toileting  -     Row Name 06/17/22 1451          Toileting Assessment/Training    [place] Level (Toileting) change pad/brief;maximum assist (25% patient effort);dependent (less than 25% patient effort)  -RM     Position (Toileting) supine  -RM           User Key  (r) = Recorded By, (t) = Taken By, (c) = Cosigned By    Initials Name Provider Type    RM Kasie Rivas OT Occupational Therapist               Obj/Interventions     Row Name 06/17/22 1456          Balance    Balance Assessment sitting static balance;sitting dynamic balance;sit to stand dynamic balance;standing dynamic balance;standing static balance  -RM     Static Sitting Balance standby assist;contact guard;verbal cues;1-person assist  -RM     Dynamic Sitting Balance contact guard;verbal cues  -RM     Position, Sitting Balance unsupported;sitting edge of bed  -RM     Sit to Stand Dynamic Balance minimal assist;verbal cues  -RM     Static Standing Balance minimal assist;verbal cues  -RM     Dynamic Standing Balance minimal assist;verbal cues  -RM     Position/Device Used, Standing Balance supported;walker, rolling  -RM     Balance Interventions standing;sit to  stand;sitting;supported;static;dynamic  -RM     Comment, Balance balance sitting EOB with SBA-CGA for 5+ mins; Standing tolerance 5 mins with min A.  Steps too far from FWW and constant vcs for each step of tasks.  Limited by extreme [place].  -RM           User Key  (r) = Recorded By, (t) = Taken By, (c) = Cosigned By    Initials Name Provider Type    RM Kasie Rivas, ELY Occupational Therapist               Goals/Plan    No documentation.                Clinical Impression     Row Name 06/17/22 1458          Pain Assessment    Pretreatment Pain Rating 0/10 - no pain  -RM     Posttreatment Pain Rating 0/10 - no pain  -RM     Pre/Posttreatment Pain Comment Pt reports no pain  -RM     Pain Intervention(s) Ambulation/increased activity;Repositioned  -RM     Additional Documentation Pain Scale: Word Pre/Post-Treatment (Group)  -RM     Row Name 06/17/22 1458          Plan of Care Review    Plan of Care Reviewed With patient  -RM     Progress improving  -RM     Outcome Evaluation Pt seen for OT treatment this date. Pt supine in bed with head of bed elevated.  O2 sat was fluctuating in 80s but later found sensor sideways on finger and not accurate reading.  O2 at 95% with O2 placed 3L.  Rolling left and right in bed with use of bed rails with min A & vcs for donning brief at max A-dependent.  Supine to sit EOB mod A x 1 with constant vcs.  Sitting EOB 5 mins CGA-SBA.  Sit to stand with min A & constant vcs at FWW.  Pt functional mobility with FWW approximately 30 ft with FWW and constant vcs for each step of functional mobility.  Pt tends to walk too far from walker and R LE gets stuck frequently with pt unable to move.  Verbal and tactile cues to resume mobility.  Transfer to bed with min A & vcs and sit to supine with mod A & vcs.  Pt extremely [place] of has great difficulty following instructions due to hearing deficit.  Pt reports no pain after OT completion.  Pt sitting up in bed with LE slightly elevated, call light by  right hand, bed alarm on, O2 at 2L with O2 sat at 95%.  Son at bedside.  -RM     Row Name 06/17/22 1458          Vital Signs    Pre SpO2 (%) 84  inaccurate reading due to sensor on finger sideways  -RM     O2 Delivery Pre Treatment room air  -RM     Intra SpO2 (%) 95  3L  -RM     O2 Delivery Intra Treatment supplemental O2  -RM     Post SpO2 (%) 95  2L  -RM     O2 Delivery Post Treatment supplemental O2  -RM     Pre Patient Position Supine  -RM     Intra Patient Position Standing  -RM     Post Patient Position Supine  -RM     Row Name 06/17/22 1458          Positioning and Restraints    Pre-Treatment Position in bed  -RM     Post Treatment Position bed  -RM     In Bed notified nsg;supine;call light within reach;encouraged to call for assist;exit alarm on;with family/caregiver;side rails up x2;legs elevated  -RM           User Key  (r) = Recorded By, (t) = Taken By, (c) = Cosigned By    Initials Name Provider Type    Kasie Iverson OT Occupational Therapist               Outcome Measures     Row Name 06/17/22 1508          How much help from another is currently needed...    Putting on and taking off regular lower body clothing? 2  -RM     Bathing (including washing, rinsing, and drying) 2  -RM     Toileting (which includes using toilet bed pan or urinal) 2  -RM     Putting on and taking off regular upper body clothing 3  -RM     Taking care of personal grooming (such as brushing teeth) 3  -RM     Eating meals 3  -RM     AM-PAC 6 Clicks Score (OT) 15  -RM     Row Name 06/17/22 1508          Functional Assessment    Outcome Measure Options AM-PAC 6 Clicks Daily Activity (OT)  -RM           User Key  (r) = Recorded By, (t) = Taken By, (c) = Cosigned By    Initials Name Provider Type    Kasie Iverson OT Occupational Therapist                Occupational Therapy Education                 Title: PT OT SLP Therapies (In Progress)     Topic: Occupational Therapy (In Progress)     Point: ADL training (Done)      Description:   Instruct learner(s) on proper safety adaptation and remediation techniques during self care or transfers.   Instruct in proper use of assistive devices.              Learning Progress Summary           Patient Acceptance, E,TB, VU by  at 6/17/2022 1508    Comment: Pt educated per safety and techniques with functional mobility.  Pt very hard of hearing making education difficult but pt verbalized understanding.    Acceptance, E,TB, VU by  at 6/16/2022 1612    Comment: Role of OT/POC                   Point: Home exercise program (Not Started)     Description:   Instruct learner(s) on appropriate technique for monitoring, assisting and/or progressing therapeutic exercises/activities.              Learner Progress:  Not documented in this visit.          Point: Precautions (Done)     Description:   Instruct learner(s) on prescribed precautions during self-care and functional transfers.              Learning Progress Summary           Patient Acceptance, E,TB, VU by  at 6/17/2022 1508    Comment: Pt educated per safety and techniques with functional mobility.  Pt very hard of hearing making education difficult but pt verbalized understanding.                   Point: Body mechanics (Done)     Description:   Instruct learner(s) on proper positioning and spine alignment during self-care, functional mobility activities and/or exercises.              Learning Progress Summary           Patient Acceptance, E,TB, VU by  at 6/17/2022 1508    Comment: Pt educated per safety and techniques with functional mobility.  Pt very hard of hearing making education difficult but pt verbalized understanding.                               User Key     Initials Effective Dates Name Provider Type Discipline     06/16/21 -  Yesy Henry Occupational Therapist OT     06/16/21 -  Kasie Rivas OT Occupational Therapist OT              OT Recommendation and Plan     Plan of Care Review  Plan of Care Reviewed With:  patient  Progress: improving  Outcome Evaluation: Pt seen for OT treatment this date. Pt supine in bed with head of bed elevated.  O2 sat was fluctuating in 80s but later found sensor sideways on finger and not accurate reading.  O2 at 95% with O2 placed 3L.  Rolling left and right in bed with use of bed rails with min A & vcs for donning brief at max A-dependent.  Supine to sit EOB mod A x 1 with constant vcs.  Sitting EOB 5 mins CGA-SBA.  Sit to stand with min A & constant vcs at FWW.  Pt functional mobility with FWW approximately 30 ft with FWW and constant vcs for each step of functional mobility.  Pt tends to walk too far from walker and R LE gets stuck frequently with pt unable to move.  Verbal and tactile cues to resume mobility.  Transfer to bed with min A & vcs and sit to supine with mod A & vcs.  Pt extremely [place] of has great difficulty following instructions due to hearing deficit.  Pt reports no pain after OT completion.  Pt sitting up in bed with LE slightly elevated, call light by right hand, bed alarm on, O2 at 2L with O2 sat at 95%.  Son at bedside.     Time Calculation:    Time Calculation- OT     Row Name 06/17/22 1509             Time Calculation- OT    OT Start Time 1353  -RM      OT Stop Time 1420  -RM      OT Time Calculation (min) 27 min  -RM      OT Received On 06/17/22  -RM      OT Goal Re-Cert Due Date 06/26/22  -RM              Timed Charges    37890 - OT Therapeutic Activity Minutes 23  -RM      32235 - OT Self Care/Mgmt Minutes 4  -RM              Total Minutes    Timed Charges Total Minutes 27  -RM       Total Minutes 27  -RM            User Key  (r) = Recorded By, (t) = Taken By, (c) = Cosigned By    Initials Name Provider Type    Kasie Iverson OT Occupational Therapist              Therapy Charges for Today     Code Description Service Date Service Provider Modifiers Qty    81782716925  OT THERAPEUTIC ACT EA 15 MIN 6/17/2022 Kasie Rivas OT GO 2               Kasie Rivas  OT  6/17/2022

## 2022-06-18 LAB
ACINETOBACTER SCREEN CX: NORMAL
ANION GAP SERPL CALCULATED.3IONS-SCNC: 10 MMOL/L (ref 5–15)
BACTERIA SPEC AEROBE CULT: ABNORMAL
BASOPHILS # BLD AUTO: 0.08 10*3/MM3 (ref 0–0.2)
BASOPHILS NFR BLD AUTO: 0.6 % (ref 0–1.5)
BUN SERPL-MCNC: 9 MG/DL (ref 8–23)
BUN/CREAT SERPL: 8.6 (ref 7–25)
CALCIUM SPEC-SCNC: 8.1 MG/DL (ref 8.6–10.5)
CHLORIDE SERPL-SCNC: 106 MMOL/L (ref 98–107)
CO2 SERPL-SCNC: 21 MMOL/L (ref 22–29)
CREAT SERPL-MCNC: 1.05 MG/DL (ref 0.76–1.27)
DEPRECATED RDW RBC AUTO: 40.8 FL (ref 37–54)
EGFRCR SERPLBLD CKD-EPI 2021: 70 ML/MIN/1.73
EOSINOPHIL # BLD AUTO: 0.16 10*3/MM3 (ref 0–0.4)
EOSINOPHIL NFR BLD AUTO: 1.3 % (ref 0.3–6.2)
ERYTHROCYTE [DISTWIDTH] IN BLOOD BY AUTOMATED COUNT: 11.8 % (ref 12.3–15.4)
GLUCOSE SERPL-MCNC: 116 MG/DL (ref 65–99)
HCT VFR BLD AUTO: 37.9 % (ref 37.5–51)
HGB BLD-MCNC: 12.9 G/DL (ref 13–17.7)
IMM GRANULOCYTES # BLD AUTO: 0.12 10*3/MM3 (ref 0–0.05)
IMM GRANULOCYTES NFR BLD AUTO: 1 % (ref 0–0.5)
LYMPHOCYTES # BLD AUTO: 1.92 10*3/MM3 (ref 0.7–3.1)
LYMPHOCYTES NFR BLD AUTO: 15.2 % (ref 19.6–45.3)
MCH RBC QN AUTO: 32.3 PG (ref 26.6–33)
MCHC RBC AUTO-ENTMCNC: 34 G/DL (ref 31.5–35.7)
MCV RBC AUTO: 95 FL (ref 79–97)
MONOCYTES # BLD AUTO: 1.06 10*3/MM3 (ref 0.1–0.9)
MONOCYTES NFR BLD AUTO: 8.4 % (ref 5–12)
NEUTROPHILS NFR BLD AUTO: 73.5 % (ref 42.7–76)
NEUTROPHILS NFR BLD AUTO: 9.29 10*3/MM3 (ref 1.7–7)
NRBC BLD AUTO-RTO: 0 /100 WBC (ref 0–0.2)
PLATELET # BLD AUTO: 173 10*3/MM3 (ref 140–450)
PMV BLD AUTO: 10 FL (ref 6–12)
POTASSIUM SERPL-SCNC: 3.9 MMOL/L (ref 3.5–5.2)
RBC # BLD AUTO: 3.99 10*6/MM3 (ref 4.14–5.8)
SODIUM SERPL-SCNC: 137 MMOL/L (ref 136–145)
VRE SPEC QL CULT: NORMAL
WBC NRBC COR # BLD: 12.63 10*3/MM3 (ref 3.4–10.8)

## 2022-06-18 PROCEDURE — 97116 GAIT TRAINING THERAPY: CPT

## 2022-06-18 PROCEDURE — 25010000002 CEFEPIME-DEXTROSE 2-5 GM-%(50ML) RECONSTITUTED SOLUTION: Performed by: STUDENT IN AN ORGANIZED HEALTH CARE EDUCATION/TRAINING PROGRAM

## 2022-06-18 PROCEDURE — 80048 BASIC METABOLIC PNL TOTAL CA: CPT | Performed by: STUDENT IN AN ORGANIZED HEALTH CARE EDUCATION/TRAINING PROGRAM

## 2022-06-18 PROCEDURE — 99232 SBSQ HOSP IP/OBS MODERATE 35: CPT | Performed by: STUDENT IN AN ORGANIZED HEALTH CARE EDUCATION/TRAINING PROGRAM

## 2022-06-18 PROCEDURE — 97530 THERAPEUTIC ACTIVITIES: CPT

## 2022-06-18 PROCEDURE — 85025 COMPLETE CBC W/AUTO DIFF WBC: CPT | Performed by: STUDENT IN AN ORGANIZED HEALTH CARE EDUCATION/TRAINING PROGRAM

## 2022-06-18 PROCEDURE — 25010000002 CEFTRIAXONE SODIUM-DEXTROSE 1-3.74 GM-%(50ML) RECONSTITUTED SOLUTION: Performed by: INTERNAL MEDICINE

## 2022-06-18 RX ORDER — CEFEPIME HYDROCHLORIDE 2 G/50ML
2 INJECTION, SOLUTION INTRAVENOUS ONCE
Status: COMPLETED | OUTPATIENT
Start: 2022-06-18 | End: 2022-06-18

## 2022-06-18 RX ORDER — CALCIUM CARBONATE 200(500)MG
2 TABLET,CHEWABLE ORAL 3 TIMES DAILY PRN
Status: DISCONTINUED | OUTPATIENT
Start: 2022-06-18 | End: 2022-06-21 | Stop reason: HOSPADM

## 2022-06-18 RX ORDER — CEFEPIME HYDROCHLORIDE 2 G/50ML
2 INJECTION, SOLUTION INTRAVENOUS EVERY 12 HOURS
Status: DISCONTINUED | OUTPATIENT
Start: 2022-06-18 | End: 2022-06-19

## 2022-06-18 RX ORDER — CEFEPIME HYDROCHLORIDE 1 G/50ML
1 INJECTION, SOLUTION INTRAVENOUS EVERY 8 HOURS
Status: DISCONTINUED | OUTPATIENT
Start: 2022-06-18 | End: 2022-06-18 | Stop reason: DRUGHIGH

## 2022-06-18 RX ORDER — CEFEPIME HYDROCHLORIDE 1 G/50ML
1 INJECTION, SOLUTION INTRAVENOUS ONCE
Status: DISCONTINUED | OUTPATIENT
Start: 2022-06-18 | End: 2022-06-18 | Stop reason: DRUGHIGH

## 2022-06-18 RX ADMIN — Medication 1 CAPSULE: at 08:36

## 2022-06-18 RX ADMIN — Medication 10 ML: at 20:50

## 2022-06-18 RX ADMIN — FINASTERIDE 5 MG: 5 TABLET, FILM COATED ORAL at 08:36

## 2022-06-18 RX ADMIN — SENNOSIDES AND DOCUSATE SODIUM 2 TABLET: 50; 8.6 TABLET ORAL at 08:36

## 2022-06-18 RX ADMIN — CEFTRIAXONE 1 G: 1 INJECTION, SOLUTION INTRAVENOUS at 03:51

## 2022-06-18 RX ADMIN — ACETAMINOPHEN 650 MG: 325 TABLET ORAL at 03:50

## 2022-06-18 RX ADMIN — CEFEPIME HYDROCHLORIDE 2 G: 2 INJECTION, SOLUTION INTRAVENOUS at 11:15

## 2022-06-18 RX ADMIN — SENNOSIDES AND DOCUSATE SODIUM 2 TABLET: 50; 8.6 TABLET ORAL at 20:50

## 2022-06-18 RX ADMIN — CALCIUM CARBONATE (ANTACID) CHEW TAB 500 MG 2 TABLET: 500 CHEW TAB at 20:51

## 2022-06-18 RX ADMIN — CEFEPIME HYDROCHLORIDE 2 G: 2 INJECTION, SOLUTION INTRAVENOUS at 23:28

## 2022-06-18 RX ADMIN — LEVOTHYROXINE SODIUM 88 MCG: 88 TABLET ORAL at 08:36

## 2022-06-18 RX ADMIN — Medication 10 ML: at 08:37

## 2022-06-18 NOTE — PLAN OF CARE
Goal Outcome Evaluation:  Plan of Care Reviewed With: patient        Progress: improving  Outcome Evaluation: VSS. ALERT, ORIENTED X3.  O2 AT 1 LITER PER N/C TO MAINTAIN SATS >90%.  NO ACUTE CHANGES OVERNIGHT.

## 2022-06-18 NOTE — PLAN OF CARE
Goal Outcome Evaluation:  Plan of Care Reviewed With: patient        Progress: improving  Outcome Evaluation: Patient demonstrates improving strength and balance as seen by his ability to perform transfers and gait with CGA/minimal assist.  He walked 45 feet with RW and cues for walker placement and posture.  He is able to stand x 8 minutes with cues for posture and keeping the walker in a good position.  Patient is expected to benefit from additional PT services per POC.

## 2022-06-18 NOTE — PLAN OF CARE
Goal Outcome Evaluation:  Plan of Care Reviewed With: patient        Progress: improving  Outcome Evaluation: VSS.  Pt needed run of IV abx.  No other changes in pt condition to report.  Will continue to monitor.

## 2022-06-18 NOTE — THERAPY TREATMENT NOTE
"Patient Name: Remington Rojas  : 1935    MRN: [id number]                              Today's Date: 2022       Admit Date: 6/15/2022    Visit Dx:     ICD-10-CM ICD-9-CM   1. Urinary tract infection without hematuria, site unspecified  N39.0 599.0     Patient Active Problem List   Diagnosis   • Preop examination   • Fever   • Nuclear sclerotic cataract of right eye   • Nuclear sclerotic cataract of left eye   • Urinary tract infection without hematuria     Past Medical History:   Diagnosis Date   • Acute myocardial infarction (HCC)     \"ABOUT 8-10 YEARS AGO\" PATIENT REPORTS   • BPH (benign prostatic hyperplasia)    • CAD (coronary artery disease)    • Elevated cholesterol     REPORTS HAS BEEN ON MEDICATION IN THE PAST   • GERD without esophagitis    • [place] (hard of hearing)    • Hyperlipidemia     UNSPECIFIED   • Hypertension    • Hypothyroidism    • Impaired functional mobility, balance, gait, and endurance    • Overactive bladder    • Seasonal allergies    • Wears dentures      Past Surgical History:   Procedure Laterality Date   • CARDIAC CATHETERIZATION      REPORTS \"ABOUT 8-10 YEARS AGO\" HAD 2 STENTS PLACED   • CATARACT EXTRACTION W/ INTRAOCULAR LENS IMPLANT Right 2021    Procedure: CATARACT PHACO EXTRACTION WITH INTRAOCULAR LENS IMPLANT RIGHT COMLEX W/ MALYUGIN RING;  Surgeon: Td Palmer MD;  Location: [facility] OR;  Service: Ophthalmology;  Laterality: Right;   • CATARACT EXTRACTION W/ INTRAOCULAR LENS IMPLANT Left 2021    Procedure: CATARACT PHACO EXTRACTION WITH INTRAOCULAR LENS IMPLANT LEFT COMPLEX W/ MALYUGIN RING;  Surgeon: Td Palmer MD;  Location: [facility] OR;  Service: Ophthalmology;  Laterality: Left;   • CORONARY ARTERY BYPASS GRAFT     • HERNIA REPAIR      BILATERAL INGUINAL HERNIA REPAIRS WITH MESH WITHIN 3 YEARS   • MOUTH SURGERY      FULL TEETH EXTRACTION   • ORIF WRIST FRACTURE Right 2018    Procedure: OPEN REDUCTION INTERNAL FIXATION WITH VOLAR " PLATE (SYNTHES), RIGHT WRIST;  Surgeon: Sen Flanagan MD;  Location: [facility];  Service: Orthopedics   • OTHER SURGICAL HISTORY      INGUINAL HERNIA REPAIR BILATERAL   • OTHER SURGICAL HISTORY      PREVIOUS STENT PLACEMENT      General Information     Row Name 06/18/22 1118          Physical Therapy Time and Intention    Document Type therapy note (daily note)  -     Mode of Treatment physical therapy  -     Row Name 06/18/22 1118          General Information    Patient Profile Reviewed yes  -           User Key  (r) = Recorded By, (t) = Taken By, (c) = Cosigned By    Initials Name Provider Type     Yuridia Lucio, PT Physical Therapist               Mobility     Row Name 06/18/22 1118          Bed Mobility    Bed Mobility supine-sit  -JR     Supine-Sit [place] (Bed Mobility) minimum assist (75% patient effort);verbal cues  -     Assistive Device (Bed Mobility) head of bed elevated;bed rails  -     Row Name 06/18/22 1118          Sit-Stand Transfer    Sit-Stand [place] (Transfers) minimum assist (75% patient effort);contact guard  -     Assistive Device (Sit-Stand Transfers) walker, front-wheeled  -     Row Name 06/18/22 1118          Gait/Stairs (Locomotion)    [place] Level (Gait) minimum assist (75% patient effort);contact guard  -     Assistive Device (Gait) walker, front-wheeled  -     Distance in Feet (Gait) 45  -JR     Deviations/Abnormal Patterns (Gait) festinating/shuffling  -JR     Bilateral Gait Deviations forward flexed posture;heel strike decreased  -           User Key  (r) = Recorded By, (t) = Taken By, (c) = Cosigned By    Initials Name Provider Type    Yuridia Dominguez, PT Physical Therapist               Obj/Interventions     Row Name 06/18/22 1118          Balance    Balance Assessment sitting static balance;sitting dynamic balance;sit to stand dynamic balance;standing static balance;standing dynamic balance  -     Static Sitting Balance standby assist  -      Dynamic Sitting Balance standby assist  -JR     Position, Sitting Balance unsupported;sitting edge of bed  -JR     Sit to Stand Dynamic Balance contact guard;minimal assist  -JR     Static Standing Balance contact guard;minimal assist  -JR     Dynamic Standing Balance minimal assist  -JR     Position/Device Used, Standing Balance supported;walker, front-wheeled  -JR     Comment, Balance Patient stood to use the bathroom and then stood to look out the window for 5 minutes  -JR           User Key  (r) = Recorded By, (t) = Taken By, (c) = Cosigned By    Initials Name Provider Type    Yuridia Dominguez, PT Physical Therapist               Goals/Plan    No documentation.                Clinical Impression     Row Name 06/18/22 1118          Pain    Pretreatment Pain Rating 0/10 - no pain  -JR     Posttreatment Pain Rating 0/10 - no pain  -JR     Additional Documentation Pain Scale: Numbers Pre/Post-Treatment (Group)  -     Row Name 06/18/22 1118          Plan of Care Review    Plan of Care Reviewed With patient  -JR     Progress improving  -     Outcome Evaluation Patient demonstrates improving strength and balance as seen by his ability to perform transfers and gait with CGA/minimal assist.  He walked 45 feet with RW and cues for walker placement and posture.  He is able to stand x 8 minutes with cues for posture and keeping the walker in a good position.  Patient is expected to benefit from additional PT services per POC.  -     Row Name 06/18/22 1118          Therapy Assessment/Plan (PT)    Patient/Family Therapy Goals Statement (PT) Patient is eager to go home  -     Row Name 06/18/22 1118          Positioning and Restraints    Pre-Treatment Position in bed  -JR     Post Treatment Position chair  -JR     In Chair sitting;call light within reach;encouraged to call for assist  -JR           User Key  (r) = Recorded By, (t) = Taken By, (c) = Cosigned By    Initials Name Provider Type    Yuridia Dominguez, PT  Physical Therapist               Outcome Measures     Row Name 06/18/22 1118          How much help from another person do you currently need...    Turning from your back to your side while in flat bed without using bedrails? 3  -JR     Moving from lying on back to sitting on the side of a flat bed without bedrails? 3  -JR     Moving to and from a bed to a chair (including a wheelchair)? 3  -JR     Standing up from a chair using your arms (e.g., wheelchair, bedside chair)? 3  -JR     Climbing 3-5 steps with a railing? 2  -JR     To walk in hospital room? 3  -JR     AM-PAC 6 Clicks Score (PT) 17  -JR     Highest level of mobility 5 --> Static standing  -JR     Row Name 06/18/22 1118          Functional Assessment    Outcome Measure Options AM-PAC 6 Clicks Basic Mobility (PT)  -           User Key  (r) = Recorded By, (t) = Taken By, (c) = Cosigned By    Initials Name Provider Type    Yuridia Dominguez, PT Physical Therapist                             Physical Therapy Education                 Title: PT OT SLP Therapies (In Progress)     Topic: Physical Therapy (In Progress)     Point: Mobility training (Done)     Learning Progress Summary           Patient Acceptance, E, VU by  at 6/17/2022 1518    Acceptance, E, VU by  at 6/16/2022 1548                   Point: Home exercise program (Not Started)     Learner Progress:  Not documented in this visit.          Point: Body mechanics (Done)     Learning Progress Summary           Patient Acceptance, E,TB, VU by  at 6/18/2022 1320    Comment: Upright posture and walker placement    Acceptance, E, VU by  at 6/17/2022 1518                   Point: Precautions (Not Started)     Learner Progress:  Not documented in this visit.                      User Key     Initials Effective Dates Name Provider Type [facility] 03/23/22 -  Yuridia Lucio, PT Physical Therapist PT     05/11/22 -  Pablo Gaspar, PT Student PT Student PT              PT Recommendation and Plan      Plan of Care Reviewed With: patient  Progress: improving  Outcome Evaluation: Patient demonstrates improving strength and balance as seen by his ability to perform transfers and gait with CGA/minimal assist.  He walked 45 feet with RW and cues for walker placement and posture.  He is able to stand x 8 minutes with cues for posture and keeping the walker in a good position.  Patient is expected to benefit from additional PT services per POC.     Time Calculation:    PT Charges     Row Name 06/18/22 1323             Time Calculation    Start Time 1118  -JR      Stop Time 1145  -JR      Time Calculation (min) 27 min  -JR      PT Received On 06/18/22  -JR      PT Goal Re-Cert Due Date 06/26/22  -JR              Timed Charges    40506 - Gait Training Minutes  17  -JR      49843 - PT Therapeutic Activity Minutes 10  -JR              Total Minutes    Timed Charges Total Minutes 27  -JR       Total Minutes 27  -JR            User Key  (r) = Recorded By, (t) = Taken By, (c) = Cosigned By    Initials Name Provider Type    Yuridia Dominguez, PT Physical Therapist              Therapy Charges for Today     Code Description Service Date Service Provider Modifiers Qty    16342657787 HC GAIT TRAINING EA 15 MIN 6/18/2022 Yuridia Lucio, PT GP 1    24832120129 HC PT THERAPEUTIC ACT EA 15 MIN 6/18/2022 Yuridia Lucio, PT GP 1          PT G-Codes  Outcome Measure Options: AM-PAC 6 Clicks Basic Mobility (PT)  AM-PAC 6 Clicks Score (PT): 17  AM-PAC 6 Clicks Score (OT): 15    Yuridia Lucio PT  6/18/2022

## 2022-06-19 LAB
ANION GAP SERPL CALCULATED.3IONS-SCNC: 11.9 MMOL/L (ref 5–15)
BASOPHILS # BLD AUTO: 0.09 10*3/MM3 (ref 0–0.2)
BASOPHILS NFR BLD AUTO: 0.8 % (ref 0–1.5)
BUN SERPL-MCNC: 9 MG/DL (ref 8–23)
BUN/CREAT SERPL: 10.1 (ref 7–25)
CALCIUM SPEC-SCNC: 8.5 MG/DL (ref 8.6–10.5)
CHLORIDE SERPL-SCNC: 102 MMOL/L (ref 98–107)
CO2 SERPL-SCNC: 20.1 MMOL/L (ref 22–29)
CREAT SERPL-MCNC: 0.89 MG/DL (ref 0.76–1.27)
DEPRECATED RDW RBC AUTO: 40.5 FL (ref 37–54)
EGFRCR SERPLBLD CKD-EPI 2021: 84.5 ML/MIN/1.73
EOSINOPHIL # BLD AUTO: 0.13 10*3/MM3 (ref 0–0.4)
EOSINOPHIL NFR BLD AUTO: 1.1 % (ref 0.3–6.2)
ERYTHROCYTE [DISTWIDTH] IN BLOOD BY AUTOMATED COUNT: 11.8 % (ref 12.3–15.4)
GLUCOSE SERPL-MCNC: 129 MG/DL (ref 65–99)
HCT VFR BLD AUTO: 39.9 % (ref 37.5–51)
HGB BLD-MCNC: 13.7 G/DL (ref 13–17.7)
IMM GRANULOCYTES # BLD AUTO: 0.17 10*3/MM3 (ref 0–0.05)
IMM GRANULOCYTES NFR BLD AUTO: 1.4 % (ref 0–0.5)
LYMPHOCYTES # BLD AUTO: 2.12 10*3/MM3 (ref 0.7–3.1)
LYMPHOCYTES NFR BLD AUTO: 17.9 % (ref 19.6–45.3)
MCH RBC QN AUTO: 32.2 PG (ref 26.6–33)
MCHC RBC AUTO-ENTMCNC: 34.3 G/DL (ref 31.5–35.7)
MCV RBC AUTO: 93.7 FL (ref 79–97)
MONOCYTES # BLD AUTO: 1.1 10*3/MM3 (ref 0.1–0.9)
MONOCYTES NFR BLD AUTO: 9.3 % (ref 5–12)
NEUTROPHILS NFR BLD AUTO: 69.5 % (ref 42.7–76)
NEUTROPHILS NFR BLD AUTO: 8.23 10*3/MM3 (ref 1.7–7)
NRBC BLD AUTO-RTO: 0 /100 WBC (ref 0–0.2)
PLATELET # BLD AUTO: 197 10*3/MM3 (ref 140–450)
PMV BLD AUTO: 9.9 FL (ref 6–12)
POTASSIUM SERPL-SCNC: 3.7 MMOL/L (ref 3.5–5.2)
RBC # BLD AUTO: 4.26 10*6/MM3 (ref 4.14–5.8)
SODIUM SERPL-SCNC: 134 MMOL/L (ref 136–145)
WBC NRBC COR # BLD: 11.84 10*3/MM3 (ref 3.4–10.8)

## 2022-06-19 PROCEDURE — 80048 BASIC METABOLIC PNL TOTAL CA: CPT | Performed by: STUDENT IN AN ORGANIZED HEALTH CARE EDUCATION/TRAINING PROGRAM

## 2022-06-19 PROCEDURE — 25010000002 CEFEPIME-DEXTROSE 2-5 GM-%(50ML) RECONSTITUTED SOLUTION: Performed by: STUDENT IN AN ORGANIZED HEALTH CARE EDUCATION/TRAINING PROGRAM

## 2022-06-19 PROCEDURE — 99232 SBSQ HOSP IP/OBS MODERATE 35: CPT | Performed by: STUDENT IN AN ORGANIZED HEALTH CARE EDUCATION/TRAINING PROGRAM

## 2022-06-19 PROCEDURE — 85025 COMPLETE CBC W/AUTO DIFF WBC: CPT | Performed by: STUDENT IN AN ORGANIZED HEALTH CARE EDUCATION/TRAINING PROGRAM

## 2022-06-19 RX ORDER — CEFEPIME HYDROCHLORIDE 2 G/50ML
2 INJECTION, SOLUTION INTRAVENOUS EVERY 8 HOURS
Status: COMPLETED | OUTPATIENT
Start: 2022-06-19 | End: 2022-06-20

## 2022-06-19 RX ADMIN — FINASTERIDE 5 MG: 5 TABLET, FILM COATED ORAL at 08:57

## 2022-06-19 RX ADMIN — LEVOTHYROXINE SODIUM 88 MCG: 88 TABLET ORAL at 08:57

## 2022-06-19 RX ADMIN — Medication 5 MG: at 21:40

## 2022-06-19 RX ADMIN — Medication 1 CAPSULE: at 08:57

## 2022-06-19 RX ADMIN — SENNOSIDES AND DOCUSATE SODIUM 2 TABLET: 50; 8.6 TABLET ORAL at 08:57

## 2022-06-19 RX ADMIN — CEFEPIME HYDROCHLORIDE 2 G: 2 INJECTION, SOLUTION INTRAVENOUS at 17:04

## 2022-06-19 RX ADMIN — Medication 10 ML: at 21:41

## 2022-06-19 RX ADMIN — Medication 10 ML: at 08:57

## 2022-06-19 RX ADMIN — SENNOSIDES AND DOCUSATE SODIUM 2 TABLET: 50; 8.6 TABLET ORAL at 21:40

## 2022-06-19 RX ADMIN — CEFEPIME HYDROCHLORIDE 2 G: 2 INJECTION, SOLUTION INTRAVENOUS at 09:01

## 2022-06-19 NOTE — PLAN OF CARE
Goal Outcome Evaluation:  Plan of Care Reviewed With: patient        Progress: improving  Outcome Evaluation: VSS,  NO ACUTE EVENTS OVERNIGHT.  O2 SATS MAINTAINED >90% ON ROOM AIR.

## 2022-06-19 NOTE — PROGRESS NOTES
[facility]    Name:  Remington Rojas   Age:  86 y.o.  Sex:  male  :  1935  MRN:  [id number]   Visit Number:  [id number]  Admission Date:  6/15/2022  Date Of Service:  22  Primary Care Physician:  Karoline Aceves MD     LOS: 3 days :    Chief Complaint:      Altered Mental Status    Subjective:    Patient seen and examined at bedside. Chart reviewed and events noted. Patient states that he is feeling pretty good today. Denies chest pain or dyspnea.    Hospital Course:    chronically ill 86-year-old male with history significant for Parkinson's disease, hypertension, hypothyroidism and BPH who presents from assisted living facility today with his family for altered mental status.  Patient's son reports that he called him earlier in the day and did not think that his dad was sounding his normal self.  He went to check on him and noticed that he was confused.  Normally his father is alert and oriented however he no longer was aware of the time.  Patient recently underwent cystoscopy on  by Dr. Fuchs due to his BPH.  Unsure exactly what procedure was performed.  Family states that at that time patient was in his normal state of health.  Of note, patient is extremely hard of hearing and is scheduled to get cochlear implants.  Labs significant for negative troponin, glucose 147, sodium 131, bicarb 20, chloride 97, creatinine 1.28, BUN 13.  CRP 4.4.  Lactate lipase and procalcitonin within normal limits.  WBC 24.  Hemoglobin hematocrit and platelets within normal limits.  Urinalysis positive for leukocytes, 31-50 WBC and trace bacteria.  CT abdomen pelvis shows gas within the bladder consistent with either infection or recent instrumentation.  Chest x-ray shows no acute process.  Patient received 1 dose of Rocephin in the emergency room.    Patient was admitted to the hospital and continued on Rocephin. Urine culture with gram negative bacillus  growth thus far. Mentation has improved.    Review of Systems:     All systems were reviewed and negative except as mentioned in subjective, assessment and plan.    Vital Signs:    Temp:  [97.5 °F (36.4 °C)-98.5 °F (36.9 °C)] 97.9 °F (36.6 °C)  Heart Rate:  [63-81] 63  Resp:  [16-18] 18  BP: (115-152)/(56-82) 126/61    Intake and output:    I/O last 3 completed shifts:  In: 1160 [P.O.:1160]  Out: 2800 [Urine:2800]  I/O this shift:  In: 240 [P.O.:240]  Out: 300 [Urine:300]    Physical Examination:    General Appearance:  Alert and cooperative. Sitting up in bed, in NAD.   Head:  Atraumatic and normocephalic. Hard of hearing.   Eyes: Conjunctivae and sclerae normal, no icterus. No pallor.           Lungs:   Breath sounds heard bilaterally equally.  No wheezing or crackles.    Heart:  Normal S1 and S2, no murmur, no gallop, no rub. No JVD.   Abdomen:   Normal bowel sounds, no masses, no organomegaly. Soft, nontender, nondistended, no rebound tenderness.   Extremities: Supple, no edema, no cyanosis, no clubbing.   Skin: No bleeding or rash.   Neurologic: Alert and oriented x 3. No facial asymmetry. Moves all four limbs. No tremors.      Laboratory results:    Results from last 7 days   Lab Units 06/19/22  0711 06/18/22  0619 06/17/22  0642 06/16/22  0553 06/15/22  2335   SODIUM mmol/L 134* 137 133*   < > 131*   POTASSIUM mmol/L 3.7 3.9 4.0   < > 4.2   CHLORIDE mmol/L 102 106 105   < > 97*   CO2 mmol/L 20.1* 21.0* 16.8*   < > 20.1*   BUN mg/dL 9 9 10   < > 13   CREATININE mg/dL 0.89 1.05 0.95   < > 1.28*   CALCIUM mg/dL 8.5* 8.1* 7.8*   < > 9.0   BILIRUBIN mg/dL  --   --   --   --  1.0   ALK PHOS U/L  --   --   --   --  92   ALT (SGPT) U/L  --   --   --   --  26   AST (SGOT) U/L  --   --   --   --  27   GLUCOSE mg/dL 129* 116* 111*   < > 147*    < > = values in this interval not displayed.     Results from last 7 days   Lab Units 06/19/22  0711 06/18/22  0619 06/17/22  0642   WBC 10*3/mm3 11.84* 12.63* 18.94*    HEMOGLOBIN g/dL 13.7 12.9* 12.6*   HEMATOCRIT % 39.9 37.9 38.0   PLATELETS 10*3/mm3 197 173 152         Results from last 7 days   Lab Units 06/15/22  2335   TROPONIN T ng/mL <0.010     Results from last 7 days   Lab Units 06/16/22  0049 06/15/22  2350 06/15/22  2335   BLOODCX   --  No growth at 3 days No growth at 3 days   URINECX  >100,000 CFU/mL Pseudomonas aeruginosa MDRO*  --   --          I have reviewed the patient's laboratory results.    Radiology results:    No radiology results from the last 24 hrs  I have reviewed the patient's radiology reports.    Medication Review:     I have reviewed the patient's active and prn medications.     Problem List:      Urinary tract infection without hematuria      Assessment:    1. Acute metabolic encephalopathy, POA  2. Acute Multi-Drug Resistant Pseudomonas aeruginosa UTI with recent instrumentation, POA  3. CAD  4. BPH  5. Hypertension  6. Hypothyroidism  7. Parkinson's disease  8. Age-related debility    Plan:    -Urine culture with multi-drug resistant Pseudomonas aeruginosa. Patient had initially been on Ceftriaxone. Will discontinue this and place him on Cefepime per sensitivities.  -Given this, patient will require IV antibiotics to complete antibiotic therapy to complete total of 10 days of antibiotics.  -Will order for PICC line to be placed  -Will let Case management know of patient requiring IV antibiotic administration at discharge.  -Received 24 hours of IV fluid resuscitation. Continue to hold IVF at this time as patient eating and drinking okay  -Continue home medications as warranted  -Continue PT/OT  -Patient will be discharged back to [facility] when medically stable. Anticipate in the next 24 hours    DVT Prophylaxis: Heparin  Code Status: DNR  Diet: Cardiac  Discharge Plan: Home at [facility] in the next 24 hours    Laquita Petit DO  06/19/22  13:13 EDT    Dictated utilizing Dragon  dictation.

## 2022-06-19 NOTE — PLAN OF CARE
Goal Outcome Evaluation:  Plan of Care Reviewed With: patient        Progress: improving  Outcome Evaluation: VSS. No acute events today. Will continue to monitor

## 2022-06-20 LAB
ANION GAP SERPL CALCULATED.3IONS-SCNC: 11.3 MMOL/L (ref 5–15)
BASOPHILS # BLD AUTO: 0.14 10*3/MM3 (ref 0–0.2)
BASOPHILS NFR BLD AUTO: 1.2 % (ref 0–1.5)
BUN SERPL-MCNC: 10 MG/DL (ref 8–23)
BUN/CREAT SERPL: 10.2 (ref 7–25)
CALCIUM SPEC-SCNC: 8.2 MG/DL (ref 8.6–10.5)
CHLORIDE SERPL-SCNC: 103 MMOL/L (ref 98–107)
CO2 SERPL-SCNC: 19.7 MMOL/L (ref 22–29)
CREAT SERPL-MCNC: 0.98 MG/DL (ref 0.76–1.27)
DEPRECATED RDW RBC AUTO: 39.5 FL (ref 37–54)
EGFRCR SERPLBLD CKD-EPI 2021: 76 ML/MIN/1.73
EOSINOPHIL # BLD AUTO: 0.22 10*3/MM3 (ref 0–0.4)
EOSINOPHIL NFR BLD AUTO: 1.9 % (ref 0.3–6.2)
ERYTHROCYTE [DISTWIDTH] IN BLOOD BY AUTOMATED COUNT: 11.5 % (ref 12.3–15.4)
GLUCOSE SERPL-MCNC: 121 MG/DL (ref 65–99)
HCT VFR BLD AUTO: 40.6 % (ref 37.5–51)
HGB BLD-MCNC: 13.9 G/DL (ref 13–17.7)
IMM GRANULOCYTES # BLD AUTO: 0.31 10*3/MM3 (ref 0–0.05)
IMM GRANULOCYTES NFR BLD AUTO: 2.7 % (ref 0–0.5)
LYMPHOCYTES # BLD AUTO: 2.86 10*3/MM3 (ref 0.7–3.1)
LYMPHOCYTES NFR BLD AUTO: 24.6 % (ref 19.6–45.3)
MCH RBC QN AUTO: 32 PG (ref 26.6–33)
MCHC RBC AUTO-ENTMCNC: 34.2 G/DL (ref 31.5–35.7)
MCV RBC AUTO: 93.5 FL (ref 79–97)
MONOCYTES # BLD AUTO: 1.48 10*3/MM3 (ref 0.1–0.9)
MONOCYTES NFR BLD AUTO: 12.7 % (ref 5–12)
NEUTROPHILS NFR BLD AUTO: 56.9 % (ref 42.7–76)
NEUTROPHILS NFR BLD AUTO: 6.6 10*3/MM3 (ref 1.7–7)
NRBC BLD AUTO-RTO: 0 /100 WBC (ref 0–0.2)
PLATELET # BLD AUTO: 209 10*3/MM3 (ref 140–450)
PMV BLD AUTO: 10.1 FL (ref 6–12)
POTASSIUM SERPL-SCNC: 3.8 MMOL/L (ref 3.5–5.2)
RBC # BLD AUTO: 4.34 10*6/MM3 (ref 4.14–5.8)
SODIUM SERPL-SCNC: 134 MMOL/L (ref 136–145)
WBC NRBC COR # BLD: 11.61 10*3/MM3 (ref 3.4–10.8)

## 2022-06-20 PROCEDURE — 25010000002 CEFEPIME-DEXTROSE 2-5 GM-%(50ML) RECONSTITUTED SOLUTION: Performed by: STUDENT IN AN ORGANIZED HEALTH CARE EDUCATION/TRAINING PROGRAM

## 2022-06-20 PROCEDURE — C1751 CATH, INF, PER/CENT/MIDLINE: HCPCS

## 2022-06-20 PROCEDURE — 05HY33Z INSERTION OF INFUSION DEVICE INTO UPPER VEIN, PERCUTANEOUS APPROACH: ICD-10-PCS | Performed by: STUDENT IN AN ORGANIZED HEALTH CARE EDUCATION/TRAINING PROGRAM

## 2022-06-20 PROCEDURE — 97530 THERAPEUTIC ACTIVITIES: CPT

## 2022-06-20 PROCEDURE — 97110 THERAPEUTIC EXERCISES: CPT

## 2022-06-20 PROCEDURE — 99232 SBSQ HOSP IP/OBS MODERATE 35: CPT | Performed by: STUDENT IN AN ORGANIZED HEALTH CARE EDUCATION/TRAINING PROGRAM

## 2022-06-20 PROCEDURE — 25010000002 CEFEPIME PER 500 MG: Performed by: STUDENT IN AN ORGANIZED HEALTH CARE EDUCATION/TRAINING PROGRAM

## 2022-06-20 PROCEDURE — 85025 COMPLETE CBC W/AUTO DIFF WBC: CPT | Performed by: STUDENT IN AN ORGANIZED HEALTH CARE EDUCATION/TRAINING PROGRAM

## 2022-06-20 PROCEDURE — C1894 INTRO/SHEATH, NON-LASER: HCPCS

## 2022-06-20 PROCEDURE — 25010000002 HEPARIN (PORCINE) PER 1000 UNITS: Performed by: INTERNAL MEDICINE

## 2022-06-20 PROCEDURE — 80048 BASIC METABOLIC PNL TOTAL CA: CPT | Performed by: STUDENT IN AN ORGANIZED HEALTH CARE EDUCATION/TRAINING PROGRAM

## 2022-06-20 RX ORDER — SODIUM CHLORIDE 0.9 % (FLUSH) 0.9 %
10 SYRINGE (ML) INJECTION EVERY 12 HOURS SCHEDULED
Status: DISCONTINUED | OUTPATIENT
Start: 2022-06-20 | End: 2022-06-21 | Stop reason: HOSPADM

## 2022-06-20 RX ORDER — SODIUM CHLORIDE 0.9 % (FLUSH) 0.9 %
20 SYRINGE (ML) INJECTION AS NEEDED
Status: DISCONTINUED | OUTPATIENT
Start: 2022-06-20 | End: 2022-06-21 | Stop reason: HOSPADM

## 2022-06-20 RX ORDER — SODIUM CHLORIDE 0.9 % (FLUSH) 0.9 %
10 SYRINGE (ML) INJECTION AS NEEDED
Status: DISCONTINUED | OUTPATIENT
Start: 2022-06-20 | End: 2022-06-21 | Stop reason: HOSPADM

## 2022-06-20 RX ADMIN — HEPARIN SODIUM 5000 UNITS: 5000 INJECTION INTRAVENOUS; SUBCUTANEOUS at 17:22

## 2022-06-20 RX ADMIN — CALCIUM CARBONATE (ANTACID) CHEW TAB 500 MG 2 TABLET: 500 CHEW TAB at 20:35

## 2022-06-20 RX ADMIN — CEFEPIME HYDROCHLORIDE 2 G: 2 INJECTION, SOLUTION INTRAVENOUS at 10:04

## 2022-06-20 RX ADMIN — FINASTERIDE 5 MG: 5 TABLET, FILM COATED ORAL at 10:05

## 2022-06-20 RX ADMIN — Medication 10 ML: at 10:00

## 2022-06-20 RX ADMIN — Medication 1 CAPSULE: at 10:05

## 2022-06-20 RX ADMIN — SENNOSIDES AND DOCUSATE SODIUM 2 TABLET: 50; 8.6 TABLET ORAL at 10:05

## 2022-06-20 RX ADMIN — SENNOSIDES AND DOCUSATE SODIUM 2 TABLET: 50; 8.6 TABLET ORAL at 20:35

## 2022-06-20 RX ADMIN — CEFEPIME HYDROCHLORIDE 2 G: 2 INJECTION, POWDER, FOR SOLUTION INTRAVENOUS at 17:22

## 2022-06-20 RX ADMIN — LEVOTHYROXINE SODIUM 88 MCG: 88 TABLET ORAL at 10:05

## 2022-06-20 RX ADMIN — Medication 10 ML: at 10:05

## 2022-06-20 RX ADMIN — CEFEPIME HYDROCHLORIDE 2 G: 2 INJECTION, SOLUTION INTRAVENOUS at 01:37

## 2022-06-20 RX ADMIN — CALCIUM CARBONATE (ANTACID) CHEW TAB 500 MG 2 TABLET: 500 CHEW TAB at 17:22

## 2022-06-20 NOTE — DISCHARGE PLACEMENT REQUEST
"Referral for STR- will need 7 days IV cefepime for UTI  Amanda Saravia (86 y.o. Male)             Date of Birth   1935    Social Security Number       Address   [address] [address] [address]    Home Phone   [phone]    MRN   [id number]       Tenriism   None    Marital Status                               Admission Date   6/15/22    Admission Type   Emergency    Admitting Provider   Pablo Holt DO    Attending Provider   Laquita Petit DO    Department, Room/Bed   [facility] MED SURG  4, 430/1       Discharge Date       Discharge Disposition       Discharge Destination                               Attending Provider: Laquita Petit DO    Allergies: Metoprolol    Isolation: Contact   Infection: Candida Auris (rule out) (22), MDR Pseudomonas (22)   Code Status: CPR   Advance Care Planning Activity    Ht: 175.3 cm (69\")   Wt: 86.2 kg (190 lb)    Admission Cmt: None   Principal Problem: None                Active Insurance as of 6/15/2022     Primary Coverage     Payor Plan Insurance Group Employer/Plan Group    MEDICARE MEDICARE A & B      Payor Plan Address Payor Plan Phone Number Payor Plan Fax Number Effective Dates    [address] [phone]  2002 - None Entered    [place] [address]       Subscriber Name Subscriber Birth Date Member ID       AMANDA SARAVIA 1935 [id number]                 Emergency Contacts      (Rel.) Home Phone Work Phone Mobile Phone    Reagan Saravia (Son) [phone] -- --    BobAnya yang (Sister) [phone] -- --               History & Physical      Pablo Holt DO at 22 0420            [facility] HOSPITALIST   HISTORY AND PHYSICAL      Name:  Amanda Saravia   Age:  86 y.o.  Sex:  male  :  1935  MRN:  [id number]   Visit Number:  [id number]  Admission Date:  6/15/2022  Date Of Service:  22  Primary Care Physician:  Karoline Aceves " MD Warner    Chief Complaint:     Altered mental status    History Of Presenting Illness:    Patient is a chronically ill 86-year-old male with history significant for Parkinson's disease, hypertension, hypothyroidism and BPH who presents from assisted living facility today with his family for altered mental status.  Patient's son reports that he called him earlier in the day and did not think that his dad was sounding his normal self.  He went to check on him and noticed that he was confused.  Normally his father is alert and oriented however he no longer was aware of the time.  Patient recently underwent cystoscopy on 6/14 by Dr. Fuchs due to his BPH.  Unsure exactly what procedure was performed.  Family states that at that time patient was in his normal state of health.  Of note, patient is extremely hard of hearing and is scheduled to get cochlear implants.  Labs significant for negative troponin, glucose 147, sodium 131, bicarb 20, chloride 97, creatinine 1.28, BUN 13.  CRP 4.4.  Lactate lipase and procalcitonin within normal limits.  WBC 24.  Hemoglobin hematocrit and platelets within normal limits.  Urinalysis positive for leukocytes, 31-50 WBC and trace bacteria.  CT abdomen pelvis shows gas within the bladder consistent with either infection or recent instrumentation.  Chest x-ray shows no acute process.  Patient received 1 dose of Rocephin in the emergency room.  Hospital service contacted for admission.    Review Of Systems:    All systems were reviewed and negative except as mentioned in history of presenting illness, assessment and plan.    Past Medical History: Patient  has a past medical history of Acute myocardial infarction (HCC), BPH (benign prostatic hyperplasia), CAD (coronary artery disease), Elevated cholesterol, GERD without esophagitis, [place] (hard of hearing), Hyperlipidemia, Hypertension, Hypothyroidism, Impaired functional mobility, balance, gait, and endurance, Overactive bladder, Seasonal  allergies, and Wears dentures.    Past Surgical History: Patient  has a past surgical history that includes Coronary artery bypass graft (2006); Other surgical history; Other surgical history; Cardiac catheterization; Hernia repair; Mouth surgery; ORIF wrist fracture (Right, 6/22/2018); Cataract extraction w/ intraocular lens implant (Right, 6/21/2021); and Cataract extraction w/ intraocular lens implant (Left, 7/19/2021).    Social History: Patient  reports that he has quit smoking. He has never used smokeless tobacco. He reports that he does not drink alcohol and does not use drugs.    Family History: Patient's family history includes Coronary artery disease in his mother and sister.    Allergies:      Metoprolol    Home Medications:    Prior to Admission Medications     Prescriptions Last Dose Informant Patient Reported? Taking?    finasteride (PROSCAR) 5 MG tablet  Self Yes No    Take 5 mg by mouth daily.    levothyroxine (SYNTHROID, LEVOTHROID) 88 MCG tablet   Yes No    Take 88 mcg by mouth Daily.    lisinopril (PRINIVIL,ZESTRIL) 2.5 MG tablet  Self Yes No    Take 2.5 mg by mouth Daily.    nitroglycerin (NITROSTAT) 0.4 MG SL tablet  Self Yes No    Place 0.4 mg under the tongue Every 5 (Five) Minutes As Needed.    ondansetron (ZOFRAN) 4 MG tablet   No No    Take 1 tablet by mouth Every 6 (Six) Hours As Needed for Nausea or Vomiting.    prednisoLONE acetate (Pred Forte) 1 % ophthalmic suspension   No No    Follow instructions on the After Visit Summary.    prednisoLONE acetate (Pred Forte) 1 % ophthalmic suspension   No No    Follow instructions on the After Visit Summary.        ED Medications:    Medications   sodium chloride 0.9 % flush 10 mL (has no administration in time range)   sodium chloride 0.9 % bolus 1,000 mL (1,000 mL Intravenous New Bag 6/16/22 0404)   cefTRIAXone (ROCEPHIN) IVPB 2 g/50ml dextrose (premix) (2 g Intravenous New Bag 6/16/22 0404)   sodium chloride 0.9 % bolus 1,000 mL (0 mL  "Intravenous Stopped 6/16/22 0133)   acetaminophen (TYLENOL) tablet 1,000 mg (1,000 mg Oral Given 6/16/22 0017)   iopamidol (ISOVUE-300) 61 % injection 100 mL (100 mL Intravenous Given 6/16/22 0214)     Vital Signs:  Temp:  [100.2 °F (37.9 °C)] 100.2 °F (37.9 °C)  Heart Rate:  [77-97] 79  Resp:  [16-18] 18  BP: (118-160)/(62-84) 131/63        06/15/22  2319   Weight: 83.9 kg (185 lb)     Body mass index is 28.13 kg/m².    Physical Exam:     Most recent vital Signs: /63   Pulse 79   Temp 100.2 °F (37.9 °C) (Oral)   Resp 18   Ht 172.7 cm (68\")   Wt 83.9 kg (185 lb)   SpO2 93%   BMI 28.13 kg/m²     Physical Exam  Constitutional:       Appearance: Normal appearance.   HENT:      Head: Normocephalic and atraumatic.      Ears:      Comments: Hard of hearing     Mouth/Throat:      Mouth: Mucous membranes are dry.      Pharynx: Oropharynx is clear.   Eyes:      Extraocular Movements: Extraocular movements intact.      Conjunctiva/sclera: Conjunctivae normal.      Pupils: Pupils are equal, round, and reactive to light.   Cardiovascular:      Rate and Rhythm: Normal rate and regular rhythm.      Pulses: Normal pulses.      Heart sounds: Normal heart sounds.   Pulmonary:      Effort: Pulmonary effort is normal. No respiratory distress.      Breath sounds: Normal breath sounds.   Abdominal:      General: Bowel sounds are normal.      Palpations: Abdomen is soft.   Skin:     General: Skin is warm and dry.   Neurological:      General: No focal deficit present.      Mental Status: He is alert. He is disoriented.         Laboratory data:    I have reviewed the labs done in the emergency room.    Results from last 7 days   Lab Units 06/15/22  2295   SODIUM mmol/L 131*   POTASSIUM mmol/L 4.2   CHLORIDE mmol/L 97*   CO2 mmol/L 20.1*   BUN mg/dL 13   CREATININE mg/dL 1.28*   CALCIUM mg/dL 9.0   BILIRUBIN mg/dL 1.0   ALK PHOS U/L 92   ALT (SGPT) U/L 26   AST (SGOT) U/L 27   GLUCOSE mg/dL 147*     Results from last 7 days "   Lab Units 06/15/22  2335   WBC 10*3/mm3 23.68*   HEMOGLOBIN g/dL 15.2   HEMATOCRIT % 44.6   PLATELETS 10*3/mm3 187         Results from last 7 days   Lab Units 06/15/22  2335   TROPONIN T ng/mL <0.010     Results from last 7 days   Lab Units 06/15/22  2335   PROBNP pg/mL 310.8         Results from last 7 days   Lab Units 06/15/22  2335   LIPASE U/L 14         Results from last 7 days   Lab Units 06/16/22  0049   COLOR UA  Yellow   GLUCOSE UA  Negative   KETONES UA  Trace*   LEUKOCYTES UA  Small (1+)*   PH, URINE  6.5   BILIRUBIN UA  Negative   UROBILINOGEN UA  1.0 E.U./dL   RBC UA /HPF 6-12*   WBC UA /HPF 31-50*       Pain Management Panel     Pain Management Panel Latest Ref Rng & Units 6/21/2015    AMPHETAMINES SCREEN, URINE NEGATIVE ng/mL Negative    BARBITURATES SCREEN NEGATIVE ng/mL Negative    BENZODIAZEPINE SCREEN, URINE NEGATIVE ng/mL Negative    COCAINE SCREEN, URINE NEGATIVE ng/mL Negative    METHADONE SCREEN, URINE NEGATIVE ng/mL Negative          EKG:      EKG personally reviewed, sinus rhythm with rate 97 bpm.  No acute ST or T wave changes.    Radiology:    CT Head Without Contrast    Result Date: 6/16/2022  FINAL REPORT TECHNIQUE: null CLINICAL HISTORY: ams COMPARISON: null FINDINGS: CT HEAD WITHOUT IV CONTRAST: COMPARISON: No relevant priors are available for comparison TECHNIQUE: Axial imaging of the head performed from the skull base to the vertex without IV contrast. Coronal reformations obtained. FINDINGS: There is diffuse atrophy. There is no mass, mass effect or midline shift. No abnormal extra-axial fluid collection or intracranial hemorrhage. There are scattered deep white matter changes. No CT evidence for acute infarction. Visualized paranasal sinuses and mastoids are clear. There are no skull fractures.     IMPRESSION: No CT evidence for an acute intracranial abnormality. Senescent brain changes. Authenticated and Electronically Signed by POLLY Ferreira MD on 06/16/2022 12:31:17  AM    CT Abdomen Pelvis With Contrast    Result Date: 6/16/2022  FINAL REPORT TECHNIQUE: null CLINICAL HISTORY: ams recent  cysto COMPARISON: null FINDINGS: CT of the abdomen and pelvis after administration of IV contrast. Technique: CT from the lung bases through the ischial tuberosities after administration of IV contrast Comparison: None Findings: Normal lung bases. Small hiatal hernia. Normal liver. No masses. The gallbladder is unremarkable by CT. Normal appearing adrenal glands. Normal spleen. No masses. The spleen appears normal in size. Simple right renal cyst. Otherwise normal kidneys. No renal mass. No hydronephrosis. Moderate distention of the rectum with stool. Diverticulosis of the colon, no diverticulitis. No pneumoperitoneum or abscess. No bowel obstruction. Normal appendix. Normal pancreas. No pancreatitis. Normal retroperitoneal structures. No adenopathy. Normal caliber abdominal aorta. Gas within the urinary bladder. This could be secondary to recent instrumentation versus infection. Bilateral L5 spondylolysis. Bilateral inguinal hernias containing only fat.     Impression: Gas within the urinary bladder. This could be secondary to recent instrumentation versus infection. Bilateral inguinal hernias containing only fat. Authenticated and Electronically Signed by Clifford Lee MD on 06/16/2022 03:00:51 AM    XR Chest 1 View    Result Date: 6/16/2022  FINAL REPORT TECHNIQUE: null CLINICAL HISTORY: ams COMPARISON: null FINDINGS: Single view of the chest Comparison: None Findings: Cardiomegaly, no CHF. Otherwise normal heart, lungs and mediastinum. No pneumonia. Status post median sternotomy.     Impression: Cardiomegaly, no CHF. Authenticated and Electronically Signed by Clifford Lee MD on 06/16/2022 01:12:04 AM      Assessment:    1. Acute metabolic encephalopathy, POA  2. Acute UTI with recent instrumentation, POA  3. CAD  4. BPH  5. Hypertension  6. Hypothyroidism  7. Parkinson's  disease  8. Age-related debility    Plan:    We will admit patient to the hospital.  Patient received first dose of Rocephin in the emergency room.  We will continue for total of 7 days.  Follow blood and urine culture.  De-escalate antibiotics as able based on cultures.  No previous urine cultures available in our system to guide current antibiotic.  Suspect that patient is also dehydrated.  Will initiate IV fluids.  Continue home medications.  Further orders clinical course dictates.    Risk Assessment: High  DVT Prophylaxis: Heparin subcu  Code Status: DNR (based on patient's living will)  Diet: Cardiac    Advance Care Planning   ACP discussion was declined by the patient. Patient has an advance directive in EMR which is still valid.            Pablo Holt DO  06/16/22  04:20 EDT    Dictated utilizing Dragon dictation.      Electronically signed by Pablo Holt DO at 06/16/22 0540         Current Facility-Administered Medications   Medication Dose Route Frequency Provider Last Rate Last Admin   • acetaminophen (TYLENOL) tablet 650 mg  650 mg Oral Q4H PRN Pablo Holt DO   650 mg at 06/18/22 0350    Or   • acetaminophen (TYLENOL) 160 MG/5ML solution 650 mg  650 mg Oral Q4H PRN Pablo Holt DO        Or   • acetaminophen (TYLENOL) suppository 650 mg  650 mg Rectal Q4H PRN Pablo Holt DO       • sennosides-docusate (PERICOLACE) 8.6-50 MG per tablet 2 tablet  2 tablet Oral BID Pablo Holt DO   2 tablet at 06/20/22 1005    And   • polyethylene glycol (MIRALAX) packet 17 g  17 g Oral Daily PRN Pablo Holt DO        And   • bisacodyl (DULCOLAX) EC tablet 5 mg  5 mg Oral Daily PRN Pablo Holt DO        And   • bisacodyl (DULCOLAX) suppository 10 mg  10 mg Rectal Daily PRN Pablo Holt DO       • calcium carbonate (TUMS) chewable tablet 500 mg (200 mg elemental)  2 tablet Oral TID PRN Pablo Holt DO   2 tablet at 06/18/22 2051   • cefepime (MAXIPIME) IVPB 2 g/50ml D5W (premix)   2 g Intravenous Q8H Laquita Petit, DO   2 g at 06/20/22 1004   • cefepime 2 gm IVPB in 100 ml NS (MBP)  2 g Intravenous Q8H Laquita Petit, DO       • finasteride (PROSCAR) tablet 5 mg  5 mg Oral Daily Pablo Holt DO   5 mg at 06/20/22 1005   • heparin (porcine) 5000 UNIT/ML injection 5,000 Units  5,000 Units Subcutaneous Q8H Pablo Holt DO   5,000 Units at 06/17/22 2140   • lactobacillus acidophilus (RISAQUAD) capsule 1 capsule  1 capsule Oral Daily Pablo Holt DO   1 capsule at 06/20/22 1005   • levothyroxine (SYNTHROID, LEVOTHROID) tablet 88 mcg  88 mcg Oral Daily Pablo Holt DO   88 mcg at 06/20/22 1005   • melatonin tablet 5 mg  5 mg Oral Nightly PRN Pablo Holt DO   5 mg at 06/19/22 2140   • ondansetron (ZOFRAN) injection 4 mg  4 mg Intravenous Q6H PRN Pablo Holt, DO       • sodium chloride 0.9 % flush 10 mL  10 mL Intravenous PRN Lynsey Howell, DO       • sodium chloride 0.9 % flush 10 mL  10 mL Intravenous Q12H Pablo Holt DO   10 mL at 06/20/22 1005   • sodium chloride 0.9 % flush 10 mL  10 mL Intravenous PRN Pablo Holt, DO       • sodium chloride 0.9 % flush 10 mL  10 mL Intravenous Q12H Laquita Petit, DO   10 mL at 06/20/22 1000   • sodium chloride 0.9 % flush 10 mL  10 mL Intravenous PRN Laquita Petit, DO       • sodium chloride 0.9 % flush 20 mL  20 mL Intravenous PRN Laquita Petit, DO         Physician Progress Notes (last 24 hours)  Notes from 06/19/22 1251 through 06/20/22 1251   No notes of this type exist for this encounter.            Physical Therapy Notes (last 48 hours)      Yuridia Lucio, PT at 06/18/22 1323  Version 1 of 1       Goal Outcome Evaluation:  Plan of Care Reviewed With: patient        Progress: improving  Outcome Evaluation: Patient demonstrates improving strength and balance as seen by his ability to perform transfers and gait with CGA/minimal assist.  He walked 45 feet with RW and cues for walker placement and  "posture.  He is able to stand x 8 minutes with cues for posture and keeping the walker in a good position.  Patient is expected to benefit from additional PT services per POC.    Electronically signed by Yuridia Lucio, PT at 22 1323     Yuridia Lucio, PT at 22 1324  Version 1 of 1         Patient Name: Remington Rojas  : 1935    MRN: [id number]                              Today's Date: 2022       Admit Date: 6/15/2022    Visit Dx:     ICD-10-CM ICD-9-CM   1. Urinary tract infection without hematuria, site unspecified  N39.0 599.0     Patient Active Problem List   Diagnosis   • Preop examination   • Fever   • Nuclear sclerotic cataract of right eye   • Nuclear sclerotic cataract of left eye   • Urinary tract infection without hematuria     Past Medical History:   Diagnosis Date   • Acute myocardial infarction (HCC)     \"ABOUT 8-10 YEARS AGO\" PATIENT REPORTS   • BPH (benign prostatic hyperplasia)    • CAD (coronary artery disease)    • Elevated cholesterol     REPORTS HAS BEEN ON MEDICATION IN THE PAST   • GERD without esophagitis    • [place] (hard of hearing)    • Hyperlipidemia     UNSPECIFIED   • Hypertension    • Hypothyroidism    • Impaired functional mobility, balance, gait, and endurance    • Overactive bladder    • Seasonal allergies    • Wears dentures      Past Surgical History:   Procedure Laterality Date   • CARDIAC CATHETERIZATION      REPORTS \"ABOUT 8-10 YEARS AGO\" HAD 2 STENTS PLACED   • CATARACT EXTRACTION W/ INTRAOCULAR LENS IMPLANT Right 2021    Procedure: CATARACT PHACO EXTRACTION WITH INTRAOCULAR LENS IMPLANT RIGHT COMLEX W/ MALYUGIN RING;  Surgeon: Td Palmer MD;  Location: [facility];  Service: Ophthalmology;  Laterality: Right;   • CATARACT EXTRACTION W/ INTRAOCULAR LENS IMPLANT Left 2021    Procedure: CATARACT PHACO EXTRACTION WITH INTRAOCULAR LENS IMPLANT LEFT COMPLEX W/ MALYUGIN RING;  Surgeon: Td Palmer MD;  Location: [facility] OR;  Service: " Ophthalmology;  Laterality: Left;   • CORONARY ARTERY BYPASS GRAFT  2006   • HERNIA REPAIR      BILATERAL INGUINAL HERNIA REPAIRS WITH MESH WITHIN 3 YEARS   • MOUTH SURGERY      FULL TEETH EXTRACTION   • ORIF WRIST FRACTURE Right 6/22/2018    Procedure: OPEN REDUCTION INTERNAL FIXATION WITH VOLAR PLATE (SYNTHES), RIGHT WRIST;  Surgeon: Sen Flanagan MD;  Location: [facility];  Service: Orthopedics   • OTHER SURGICAL HISTORY      INGUINAL HERNIA REPAIR BILATERAL   • OTHER SURGICAL HISTORY      PREVIOUS STENT PLACEMENT      General Information     Row Name 06/18/22 1118          Physical Therapy Time and Intention    Document Type therapy note (daily note)  -     Mode of Treatment physical therapy  -     Row Name 06/18/22 1118          General Information    Patient Profile Reviewed yes  -           User Key  (r) = Recorded By, (t) = Taken By, (c) = Cosigned By    Initials Name Provider Type    Yuridia Dominguez, JUAN JOSE Physical Therapist               Mobility     Row Name 06/18/22 1118          Bed Mobility    Bed Mobility supine-sit  -JR     Supine-Sit [place] (Bed Mobility) minimum assist (75% patient effort);verbal cues  -     Assistive Device (Bed Mobility) head of bed elevated;bed rails  -     Row Name 06/18/22 1118          Sit-Stand Transfer    Sit-Stand [place] (Transfers) minimum assist (75% patient effort);contact guard  -     Assistive Device (Sit-Stand Transfers) walker, front-wheeled  -     Row Name 06/18/22 1118          Gait/Stairs (Locomotion)    Bonner Level (Gait) minimum assist (75% patient effort);contact guard  -     Assistive Device (Gait) walker, front-wheeled  -     Distance in Feet (Gait) 45  -JR     Deviations/Abnormal Patterns (Gait) festinating/shuffling  -JR     Bilateral Gait Deviations forward flexed posture;heel strike decreased  -           User Key  (r) = Recorded By, (t) = Taken By, (c) = Cosigned By    Initials Name Provider Type    Yuridia Dominguez,  PT Physical Therapist               Obj/Interventions     [facility] Name 06/18/22 1118          Balance    Balance Assessment sitting static balance;sitting dynamic balance;sit to stand dynamic balance;standing static balance;standing dynamic balance  -JR     Static Sitting Balance standby assist  -JR     Dynamic Sitting Balance standby assist  -JR     Position, Sitting Balance unsupported;sitting edge of bed  -JR     Sit to Stand Dynamic Balance contact guard;minimal assist  -JR     Static Standing Balance contact guard;minimal assist  -JR     Dynamic Standing Balance minimal assist  -JR     Position/Device Used, Standing Balance supported;walker, front-wheeled  -     Comment, Balance Patient stood to use the bathroom and then stood to look out the window for 5 minutes  -           User Key  (r) = Recorded By, (t) = Taken By, (c) = Cosigned By    Initials Name Provider Type    Yuridia Dominguez, PT Physical Therapist               Goals/Plan    No documentation.                Clinical Impression     Row Name 06/18/22 1118          Pain    Pretreatment Pain Rating 0/10 - no pain  -     Posttreatment Pain Rating 0/10 - no pain  -     Additional Documentation Pain Scale: Numbers Pre/Post-Treatment (Group)  -JR     Row Name 06/18/22 1118          Plan of Care Review    Plan of Care Reviewed With patient  -     Progress improving  -     Outcome Evaluation Patient demonstrates improving strength and balance as seen by his ability to perform transfers and gait with CGA/minimal assist.  He walked 45 feet with RW and cues for walker placement and posture.  He is able to stand x 8 minutes with cues for posture and keeping the walker in a good position.  Patient is expected to benefit from additional PT services per POC.  -[facility] Name 06/18/22 1118          Therapy Assessment/Plan (PT)    Patient/Family Therapy Goals Statement (PT) Patient is eager to go home  -[facility] Name 06/18/22 1118          Positioning and  Restraints    Pre-Treatment Position in bed  -JR     Post Treatment Position chair  -JR     In Chair sitting;call light within reach;encouraged to call for assist  -JR           User Key  (r) = Recorded By, (t) = Taken By, (c) = Cosigned By    Initials Name Provider Type    Yuridia Dominguez, JUAN JOSE Physical Therapist               Outcome Measures     Row Name 06/18/22 1118          How much help from another person do you currently need...    Turning from your back to your side while in flat bed without using bedrails? 3  -JR     Moving from lying on back to sitting on the side of a flat bed without bedrails? 3  -JR     Moving to and from a bed to a chair (including a wheelchair)? 3  -JR     Standing up from a chair using your arms (e.g., wheelchair, bedside chair)? 3  -JR     Climbing 3-5 steps with a railing? 2  -JR     To walk in hospital room? 3  -JR     AM-PAC 6 Clicks Score (PT) 17  -JR     Highest level of mobility 5 --> Static standing  -JR     Row Name 06/18/22 1118          Functional Assessment    Outcome Measure Options AM-PAC 6 Clicks Basic Mobility (PT)  -JR           User Key  (r) = Recorded By, (t) = Taken By, (c) = Cosigned By    Initials Name Provider Type    Yuridia Dominguez PT Physical Therapist                             Physical Therapy Education                 Title: PT OT SLP Therapies (In Progress)     Topic: Physical Therapy (In Progress)     Point: Mobility training (Done)     Learning Progress Summary           Patient Acceptance, E, VU by SALEEM at 6/17/2022 1518    Acceptance, E, VU by SALEEM at 6/16/2022 1548                   Point: Home exercise program (Not Started)     Learner Progress:  Not documented in this visit.          Point: Body mechanics (Done)     Learning Progress Summary           Patient Acceptance, E,TB, VU by  at 6/18/2022 1320    Comment: Upright posture and walker placement    Acceptance, E, VU by SALEEM at 6/17/2022 1518                   Point: Precautions (Not Started)      Learner Progress:  Not documented in this visit.                      User Key     Initials Effective Dates Name Provider Type Discipline     03/23/22 -  Yuridia Lucio, PT Physical Therapist PT     05/11/22 -  Pablo Gaspar PT Student PT Student PT              PT Recommendation and Plan     Plan of Care Reviewed With: patient  Progress: improving  Outcome Evaluation: Patient demonstrates improving strength and balance as seen by his ability to perform transfers and gait with CGA/minimal assist.  He walked 45 feet with RW and cues for walker placement and posture.  He is able to stand x 8 minutes with cues for posture and keeping the walker in a good position.  Patient is expected to benefit from additional PT services per POC.     Time Calculation:    PT Charges     Row Name 06/18/22 1323             Time Calculation    Start Time 1118  -      Stop Time 1145  -      Time Calculation (min) 27 min  -JR      PT Received On 06/18/22  -      PT Goal Re-Cert Due Date 06/26/22  -              Timed Charges    79002 - Gait Training Minutes  17  -JR      97375 - PT Therapeutic Activity Minutes 10  -JR              Total Minutes    Timed Charges Total Minutes 27  -JR       Total Minutes 27  -JR            User Key  (r) = Recorded By, (t) = Taken By, (c) = Cosigned By    Initials Name Provider Type    JR Yuridia Lucio, PT Physical Therapist              Therapy Charges for Today     Code Description Service Date Service Provider Modifiers Qty    04632992427 HC GAIT TRAINING EA 15 MIN 6/18/2022 Yuridia Lucio, PT GP 1    65411338873 HC PT THERAPEUTIC ACT EA 15 MIN 6/18/2022 Yuridia Lucio, PT GP 1          PT G-Codes  Outcome Measure Options: AM-PAC 6 Clicks Basic Mobility (PT)  AM-PAC 6 Clicks Score (PT): 17  AM-PAC 6 Clicks Score (OT): 15    Yuridia Lucio PT  6/18/2022      Electronically signed by Yuridia Lucio PT at 06/18/22 1324       Occupational Therapy Notes (last 48 hours)  Notes from 06/18/22 1252 through  06/20/22 1252   No notes exist for this encounter.

## 2022-06-20 NOTE — PLAN OF CARE
Goal Outcome Evaluation:  Plan of Care Reviewed With: patient        Progress: improving  Outcome Evaluation: OT tx completed. Patient supine in bed and agreed to participate. Assisted patient in donning pull up in supine with mod A. Patient completed bed mobility with min A, sit to stand and pivot to chair with min A. Patient completed grooming tasks with S/Uand UB ther ex using yellow theraband. Continue OT POC

## 2022-06-20 NOTE — THERAPY TREATMENT NOTE
"Patient Name: Remington Rojas  : 1935    MRN: [id number]                              Today's Date: 2022       Admit Date: 6/15/2022    Visit Dx:     ICD-10-CM ICD-9-CM   1. Urinary tract infection without hematuria, site unspecified  N39.0 599.0     Patient Active Problem List   Diagnosis   • Preop examination   • Fever   • Nuclear sclerotic cataract of right eye   • Nuclear sclerotic cataract of left eye   • Urinary tract infection without hematuria     Past Medical History:   Diagnosis Date   • Acute myocardial infarction (HCC)     \"ABOUT 8-10 YEARS AGO\" PATIENT REPORTS   • BPH (benign prostatic hyperplasia)    • CAD (coronary artery disease)    • Elevated cholesterol     REPORTS HAS BEEN ON MEDICATION IN THE PAST   • GERD without esophagitis    • [place] (hard of hearing)    • Hyperlipidemia     UNSPECIFIED   • Hypertension    • Hypothyroidism    • Impaired functional mobility, balance, gait, and endurance    • Overactive bladder    • Seasonal allergies    • Wears dentures      Past Surgical History:   Procedure Laterality Date   • CARDIAC CATHETERIZATION      REPORTS \"ABOUT 8-10 YEARS AGO\" HAD 2 STENTS PLACED   • CATARACT EXTRACTION W/ INTRAOCULAR LENS IMPLANT Right 2021    Procedure: CATARACT PHACO EXTRACTION WITH INTRAOCULAR LENS IMPLANT RIGHT COMLEX W/ MALYUGIN RING;  Surgeon: Td Palmer MD;  Location: [facility];  Service: Ophthalmology;  Laterality: Right;   • CATARACT EXTRACTION W/ INTRAOCULAR LENS IMPLANT Left 2021    Procedure: CATARACT PHACO EXTRACTION WITH INTRAOCULAR LENS IMPLANT LEFT COMPLEX W/ MALYUGIN RING;  Surgeon: Td Palmer MD;  Location: [facility];  Service: Ophthalmology;  Laterality: Left;   • CORONARY ARTERY BYPASS GRAFT     • HERNIA REPAIR      BILATERAL INGUINAL HERNIA REPAIRS WITH MESH WITHIN 3 YEARS   • MOUTH SURGERY      FULL TEETH EXTRACTION   • ORIF WRIST FRACTURE Right 2018    Procedure: OPEN REDUCTION INTERNAL FIXATION WITH VOLAR " PLATE (SYNTHES), RIGHT WRIST;  Surgeon: Sen Flanagan MD;  Location: [facility];  Service: Orthopedics   • OTHER SURGICAL HISTORY      INGUINAL HERNIA REPAIR BILATERAL   • OTHER SURGICAL HISTORY      PREVIOUS STENT PLACEMENT      General Information     Row Name 06/20/22 1505          OT Time and Intention    Document Type therapy note (daily note)  -SD     Mode of Treatment occupational therapy  -SD     Row Name 06/20/22 1505          General Information    Patient Profile Reviewed yes  -SD           User Key  (r) = Recorded By, (t) = Taken By, (c) = Cosigned By    Initials Name Provider Type    SD Suzie George, OT Occupational Therapist                 Mobility/ADL's     Row Name 06/20/22 1505          Bed Mobility    Bed Mobility supine-sit  -SD     Supine-Sit [place] (Bed Mobility) minimum assist (75% patient effort)  -SD     Assistive Device (Bed Mobility) bed rails;head of bed elevated;draw sheet  -SD     Row Name 06/20/22 1505          Transfers    Transfers sit-stand transfer;bed-chair transfer  -SD     Bed-Chair [place] (Transfers) minimum assist (75% patient effort)  -SD     Assistive Device (Bed-Chair Transfers) walker, front-wheeled  -SD     Sit-Stand [place] (Transfers) minimum assist (75% patient effort)  -SD     Row Name 06/20/22 1505          Sit-Stand Transfer    Assistive Device (Sit-Stand Transfers) walker, front-wheeled  -SD     Row Name 06/20/22 1505          Grooming Assessment/Training    [place] Level (Grooming) oral care regimen;wash face, hands;set up  -SD     Position (Grooming) supported sitting  -SD     Comment, (Grooming) dentures placed in cup to soak  -SD     Row Name 06/20/22 1501          Toileting Assessment/Training    [place] Level (Toileting) adjust/manage clothing;bridging;moderate assist (50% patient effort)  -SD     Position (Toileting) supine  -SD           User Key  (r) = Recorded By, (t) = Taken By, (c) = Cosigned By    Initials Name Provider  Type    Suize Villarreal OT Occupational Therapist               Obj/Interventions     Row Name 06/20/22 1508          Shoulder (Therapeutic Exercise)    Shoulder (Therapeutic Exercise) strengthening exercise  -SD     Shoulder Strengthening (Therapeutic Exercise) bilateral;flexion;extension;aBduction;aDduction;horizontal aBduction/aDduction;yellow;15 repititions  -SD     Row Name 06/20/22 1508          Elbow/Forearm (Therapeutic Exercise)    Elbow/Forearm (Therapeutic Exercise) strengthening exercise  -SD     Elbow/Forearm Strengthening (Therapeutic Exercise) bilateral;flexion;extension;yellow;15 repititions  -SD     Row Name 06/20/22 1508          Motor Skills    Therapeutic Exercise shoulder;elbow/forearm  -SD           User Key  (r) = Recorded By, (t) = Taken By, (c) = Cosigned By    Initials Name Provider Type    Suzie Villarreal OT Occupational Therapist               Goals/Plan    No documentation.                Clinical Impression     Row Name 06/20/22 1508          Pain Assessment    Pretreatment Pain Rating 0/10 - no pain  -SD     Posttreatment Pain Rating 0/10 - no pain  -SD     Row Name 06/20/22 1508          Plan of Care Review    Plan of Care Reviewed With patient  -SD     Progress improving  -SD     Outcome Evaluation OT tx completed. Patient supine in bed and agreed to participate. Assisted patient in donning pull up in supine with mod A. Patient completed bed mobility with min A, sit to stand and pivot to chair with min A. Patient completed grooming tasks with S/Uand UB ther ex using yellow theraband. Continue OT POC  -SD     Row Name 06/20/22 1508          Positioning and Restraints    Pre-Treatment Position in bed  -SD     Post Treatment Position chair  -SD     In Chair reclined;call light within reach;encouraged to call for assist  -SD           User Key  (r) = Recorded By, (t) = Taken By, (c) = Cosigned By    Initials Name Provider Type    Suzie Villarreal OT Occupational Therapist                Outcome Measures     Row Name 06/20/22 1512          How much help from another is currently needed...    Putting on and taking off regular lower body clothing? 2  -SD     Bathing (including washing, rinsing, and drying) 2  -SD     Toileting (which includes using toilet bed pan or urinal) 2  -SD     Putting on and taking off regular upper body clothing 3  -SD     Taking care of personal grooming (such as brushing teeth) 3  -SD     Eating meals 3  -SD     AM-PAC 6 Clicks Score (OT) 15  -SD     Row Name 06/20/22 1512          Functional Assessment    Outcome Measure Options AM-PAC 6 Clicks Daily Activity (OT)  -SD           User Key  (r) = Recorded By, (t) = Taken By, (c) = Cosigned By    Initials Name Provider Type    Suzie Villarreal OT Occupational Therapist                Occupational Therapy Education                 Title: PT OT SLP Therapies (In Progress)     Topic: Occupational Therapy (Done)     Point: ADL training (Done)     Description:   Instruct learner(s) on proper safety adaptation and remediation techniques during self care or transfers.   Instruct in proper use of assistive devices.              Learning Progress Summary           Patient Acceptance, E,TB, VU by  at 6/17/2022 1508    Comment: Pt educated per safety and techniques with functional mobility.  Pt very hard of hearing making education difficult but pt verbalized understanding.    Acceptance, E,TB, VU by  at 6/16/2022 1612    Comment: Role of OT/POC                   Point: Home exercise program (Done)     Description:   Instruct learner(s) on appropriate technique for monitoring, assisting and/or progressing therapeutic exercises/activities.              Learning Progress Summary           Patient Acceptance, E,TB, VU by SD at 6/20/2022 1514    Comment: Benefit of UB ther ex                   Point: Precautions (Done)     Description:   Instruct learner(s) on prescribed precautions during self-care and functional  transfers.              Learning Progress Summary           Patient Acceptance, E,TB, VU by  at 6/17/2022 1508    Comment: Pt educated per safety and techniques with functional mobility.  Pt very hard of hearing making education difficult but pt verbalized understanding.                   Point: Body mechanics (Done)     Description:   Instruct learner(s) on proper positioning and spine alignment during self-care, functional mobility activities and/or exercises.              Learning Progress Summary           Patient Acceptance, E,TB, VU by  at 6/17/2022 1508    Comment: Pt educated per safety and techniques with functional mobility.  Pt very hard of hearing making education difficult but pt verbalized understanding.                               User Key     Initials Effective Dates Name Provider Type Discipline     06/16/21 -  Yesy Henry Occupational Therapist OT    SD 06/16/21 -  Suzie George OT Occupational Therapist OT     06/16/21 -  Kasie Rivas OT Occupational Therapist OT              OT Recommendation and Plan     Plan of Care Review  Plan of Care Reviewed With: patient  Progress: improving  Outcome Evaluation: OT tx completed. Patient supine in bed and agreed to participate. Assisted patient in donning pull up in supine with mod A. Patient completed bed mobility with min A, sit to stand and pivot to chair with min A. Patient completed grooming tasks with S/Uand UB ther ex using yellow theraband. Continue OT POC     Time Calculation:    Time Calculation- OT     Row Name 06/20/22 1515             Time Calculation- OT    OT Start Time 1414  -SD      OT Stop Time 1445  -SD      OT Time Calculation (min) 31 min  -SD      OT Received On 06/20/22  -SD      OT Goal Re-Cert Due Date 06/26/22  -SD              Timed Charges    03111 - OT Therapeutic Exercise Minutes 15  -SD      60387 - OT Therapeutic Activity Minutes 10  -SD      75851 - OT Self Care/Mgmt Minutes 6  -SD              Total  Minutes    Timed Charges Total Minutes 31  -SD       Total Minutes 31  -SD            User Key  (r) = Recorded By, (t) = Taken By, (c) = Cosigned By    Initials Name Provider Type    Suzie Villarreal OT Occupational Therapist              Therapy Charges for Today     Code Description Service Date Service Provider Modifiers Qty    22861232773  OT THER PROC EA 15 MIN 6/20/2022 Suzie George OT GO 1    [id number]  OT THERAPEUTIC ACT EA 15 MIN 6/20/2022 Suzie George OT GO 1               Suzie George OT  6/20/2022

## 2022-06-20 NOTE — PLAN OF CARE
Goal Outcome Evaluation:  Plan of Care Reviewed With: patient        Progress: improving  Outcome Evaluation: VSS, NO ACUTE EVENTS OVERNIGHT

## 2022-06-20 NOTE — CASE MANAGEMENT/SOCIAL WORK
1230 CM attempted to speak with pt at bedside regarding DCP. Communication efforts impaired due to pt profound hearing loss. He was able to confirm his name,  and his  current location.      1250 CM spoke to son/POA/Reagan Rojas, by telephone regarding need for IV abx after DC and if he feels pt would benefit from STR. Reagan feels STR would be best for him at this time due to need for PT and IV ABX. He states he has encouraged his dad to use walker to assist with amb, but  pt has been reluctant in the past. Reagan states pt has difficulty ambulating due to Parkinson's and usually uses a W/C to attend meals in lunch room  at [facility]. Reagan states [facility], but is agreeable to referrals being sent to other [facility]  Facilities [facility] [place]. CM advised Reagan pt is medically stable for DC (per MD) and will call him with updates regarding rehab placement. He states pt has difficulty getting in his car and pt will need to go by ambulance. Reagan expressed gratitude for CM efforts.     CM called [facility] to give pt update. Healthcare Coordinator unavailable. CM number left for return call    1300 VM left on [facility]'s  ([facility] stating referral has been sent. CM requested callback    1416 CM spoke with [facility]  regarding STR referral. She states she would submit to coordinator for approval and call with update    1415 CM received call from [facility] stating she could offer pt a bed. CM advised preferred facility is [facility] and they are going to call me with update. Cm agreed to call her with update    1530 Cm called and left vm for [facility]  requesting update    1605 Cm called and updated Perla/[facility] with pt DCP. CM advised pt plan is for STR and will likely DC to [facility]/ or the [facility] tomorrow.

## 2022-06-20 NOTE — PROGRESS NOTES
"    [facility]    Name:  Remington Rojas   Age:  86 y.o.  Sex:  male  :  1935  MRN:  [id number]   Visit Number:  [id number]  Admission Date:  6/15/2022  Date Of Service:  22  Primary Care Physician:  Karoline Aceves MD     LOS: 4 days :    Chief Complaint:      Altered Mental Status    Subjective:    Patient seen and examined at bedside. Chart reviewed and events noted. Patient states that he is feeling \"pretty good.\" Denies chest pain or dyspnea.    Hospital Course:    Chronically ill 86-year-old male with history significant for Parkinson's disease, hypertension, hypothyroidism and BPH who presents from assisted living facility today with his family for altered mental status.  Patient recently underwent cystoscopy on  by Dr. Fuchs due to his BPH.  Unsure exactly what procedure was performed, but it is reported that this is a \"yearly scope that Dr. Fuchs performs for check up.\"  Family states that at that time patient was in his normal state of health, but over the next couple days developed encephalopathy.    Of note, patient is extremely hard of hearing and is scheduled to get cochlear implants.    Patient was admitted to the hospital and started on Rocephin for UTI. Urine culture returned with multi-drug resistant Pseudomonas aeruginosa . Patient was transitioned to Cefepime. PICC line was placed on 22. Patient will require 10 days of appropriate antibiotic therapy with stop date of 22.     Case management working on placement in rehab facility where patient can continue with PT/OT as well as receive his IV antibiotics.    Review of Systems:     All systems were reviewed and negative except as mentioned in subjective, assessment and plan.    Vital Signs:    Temp:  [97.5 °F (36.4 °C)-98.4 °F (36.9 °C)] 97.5 °F (36.4 °C)  Heart Rate:  [64-84] 64  Resp:  [18-20] 18  BP: (114-143)/(61-83) 133/83    Intake and output:    I/O last 3 " completed shifts:  In: 1760 [P.O.:940; I.V.:820]  Out: 2200 [Urine:2200]  I/O this shift:  In: 720 [P.O.:720]  Out: 500 [Urine:500]    Physical Examination:    General Appearance:  Alert and cooperative. Sitting up in bed, in NAD.   Head:  Atraumatic and normocephalic. Hard of hearing.   Eyes: Conjunctivae and sclerae normal, no icterus. No pallor.           Lungs:   Breath sounds heard bilaterally equally.  No wheezing or crackles.    Heart:  Normal S1 and S2, no murmur, no gallop, no rub. No JVD.   Abdomen:   Normal bowel sounds, no masses, no organomegaly. Soft, nontender, nondistended, no rebound tenderness.   Extremities: Supple, no edema, no cyanosis, no clubbing.   Skin: No bleeding or rash.   Neurologic: Alert and oriented x 3. No facial asymmetry. Moves all four limbs. No tremors.      Laboratory results:    Results from last 7 days   Lab Units 06/20/22  0533 06/19/22  0711 06/18/22  0619 06/16/22  0553 06/15/22  2335   SODIUM mmol/L 134* 134* 137   < > 131*   POTASSIUM mmol/L 3.8 3.7 3.9   < > 4.2   CHLORIDE mmol/L 103 102 106   < > 97*   CO2 mmol/L 19.7* 20.1* 21.0*   < > 20.1*   BUN mg/dL 10 9 9   < > 13   CREATININE mg/dL 0.98 0.89 1.05   < > 1.28*   CALCIUM mg/dL 8.2* 8.5* 8.1*   < > 9.0   BILIRUBIN mg/dL  --   --   --   --  1.0   ALK PHOS U/L  --   --   --   --  92   ALT (SGPT) U/L  --   --   --   --  26   AST (SGOT) U/L  --   --   --   --  27   GLUCOSE mg/dL 121* 129* 116*   < > 147*    < > = values in this interval not displayed.     Results from last 7 days   Lab Units 06/20/22  0533 06/19/22  0711 06/18/22  0619   WBC 10*3/mm3 11.61* 11.84* 12.63*   HEMOGLOBIN g/dL 13.9 13.7 12.9*   HEMATOCRIT % 40.6 39.9 37.9   PLATELETS 10*3/mm3 209 197 173         Results from last 7 days   Lab Units 06/15/22  2335   TROPONIN T ng/mL <0.010     Results from last 7 days   Lab Units 06/16/22  0049 06/15/22  2350 06/15/22  2335   BLOODCX   --  No growth at 4 days No growth at 4 days   URINECX  >100,000 CFU/mL  Pseudomonas aeruginosa MDRO*  --   --          I have reviewed the patient's laboratory results.    Radiology results:    No radiology results from the last 24 hrs  I have reviewed the patient's radiology reports.    Medication Review:     I have reviewed the patient's active and prn medications.     Problem List:      Urinary tract infection without hematuria      Assessment:    1. Acute metabolic encephalopathy, POA  2. Acute Multi-Drug Resistant Pseudomonas aeruginosa UTI with recent instrumentation, POA  3. CAD  4. BPH  5. Hypertension  6. Hypothyroidism  7. Parkinson's disease  8. Age-related debility    Plan:    -Urine culture with multi-drug resistant Pseudomonas aeruginosa. Patient had initially been on Ceftriaxone. With urine culture data, this was then discontinued and patient was started on Cefepime per sensitivities.  -Given this, patient will require IV antibiotics to complete antibiotic therapy to complete total of 10 days of antibiotics. Stop date of 6/27/22  -PICC line placed today (6/20/22)  -Continue home medications as warranted  -Continue PT/OT  -Case management working on placement in rehab facility where he can continue to receive his antibiotics. Anticipate discharge in the next 24-48 hours    DVT Prophylaxis: Heparin  Code Status: DNR  Diet: Cardiac  Discharge Plan: Rehab    Laquita Petit, DO  06/20/22  16:46 EDT    Dictated utilizing Dragon dictation.

## 2022-06-21 VITALS
SYSTOLIC BLOOD PRESSURE: 129 MMHG | HEART RATE: 63 BPM | OXYGEN SATURATION: 99 % | HEIGHT: 69 IN | DIASTOLIC BLOOD PRESSURE: 61 MMHG | WEIGHT: 187.39 LBS | BODY MASS INDEX: 27.76 KG/M2 | TEMPERATURE: 98.3 F | RESPIRATION RATE: 16 BRPM

## 2022-06-21 PROBLEM — G93.41 ACUTE METABOLIC ENCEPHALOPATHY: Status: ACTIVE | Noted: 2022-06-21

## 2022-06-21 PROBLEM — N40.1 BENIGN PROSTATIC HYPERPLASIA WITH URINARY RETENTION: Status: ACTIVE | Noted: 2022-06-21

## 2022-06-21 PROBLEM — E03.9 HYPOTHYROIDISM (ACQUIRED): Status: ACTIVE | Noted: 2022-06-21

## 2022-06-21 PROBLEM — H91.90 HARD OF HEARING: Status: ACTIVE | Noted: 2022-06-21

## 2022-06-21 PROBLEM — I10 ESSENTIAL HYPERTENSION: Status: ACTIVE | Noted: 2022-06-21

## 2022-06-21 PROBLEM — R33.8 BENIGN PROSTATIC HYPERPLASIA WITH URINARY RETENTION: Status: ACTIVE | Noted: 2022-06-21

## 2022-06-21 PROBLEM — G93.41 ACUTE METABOLIC ENCEPHALOPATHY: Status: RESOLVED | Noted: 2022-06-21 | Resolved: 2022-06-21

## 2022-06-21 LAB
BACTERIA ISLT: NORMAL
BACTERIA SPEC AEROBE CULT: NORMAL
BACTERIA SPEC AEROBE CULT: NORMAL
SARS-COV-2 RNA PNL SPEC NAA+PROBE: NOT DETECTED

## 2022-06-21 PROCEDURE — 99238 HOSP IP/OBS DSCHRG MGMT 30/<: CPT | Performed by: INTERNAL MEDICINE

## 2022-06-21 PROCEDURE — 25010000002 CEFEPIME PER 500 MG: Performed by: STUDENT IN AN ORGANIZED HEALTH CARE EDUCATION/TRAINING PROGRAM

## 2022-06-21 PROCEDURE — 87635 SARS-COV-2 COVID-19 AMP PRB: CPT | Performed by: INTERNAL MEDICINE

## 2022-06-21 RX ORDER — ASPIRIN 81 MG/1
81 TABLET ORAL DAILY
Start: 2022-06-21

## 2022-06-21 RX ADMIN — Medication 10 ML: at 01:00

## 2022-06-21 RX ADMIN — Medication 1 CAPSULE: at 09:19

## 2022-06-21 RX ADMIN — LEVOTHYROXINE SODIUM 88 MCG: 88 TABLET ORAL at 09:19

## 2022-06-21 RX ADMIN — CEFEPIME HYDROCHLORIDE 2 G: 2 INJECTION, POWDER, FOR SOLUTION INTRAVENOUS at 01:00

## 2022-06-21 RX ADMIN — Medication 10 ML: at 09:19

## 2022-06-21 RX ADMIN — Medication 10 ML: at 09:20

## 2022-06-21 RX ADMIN — CEFEPIME HYDROCHLORIDE 2 G: 2 INJECTION, POWDER, FOR SOLUTION INTRAVENOUS at 09:53

## 2022-06-21 RX ADMIN — FINASTERIDE 5 MG: 5 TABLET, FILM COATED ORAL at 09:19

## 2022-06-21 NOTE — PLAN OF CARE
Goal Outcome Evaluation:   Discharged to [facility] via private transportation.  Report called to receiving nurse.  Vitals unremarkable.  No acute events this shift.

## 2022-06-21 NOTE — PLAN OF CARE
Goal Outcome Evaluation:  Plan of Care Reviewed With: patient        Progress: improving  Outcome Evaluation: pleasant patient with no complaint of pain at this time-medications given per Dr. Ordonez's orders-scheduled IV antibiotics per orders-monitor labs and continue to monitor patient

## 2022-06-21 NOTE — CASE MANAGEMENT/SOCIAL WORK
0688 [facility] called Cm stating they can offer pt a bed and he can come today. She is aware pt will need 5-6 more days of IV cefepime. Pt will need COVID test prior to DC and will need EMS transport (per son). Report can be faxed to [phone] and will go to 3rd floor.    2922 CM called son, Reagan and updated him on [facility] accepting the referral for STR and [facility] will be calling to request he comes in to complete paperwork. Dr Gan updated by secure chat and pt RN updated by telephone.

## 2022-06-21 NOTE — DISCHARGE SUMMARY
[facility]   DISCHARGE SUMMARY      Name:  Remington Rojas   Age:  86 y.o.  Sex:  male  :  1935  MRN:  [id number]   Visit Number:  [id number]    Admission Date:  6/15/2022  Date of Discharge:  2022  Primary Care Physician:  Karoline Aceves MD    Important issues to note:    1.  Patient was mainly treated for acute urinary tract infection secondary to MDR Pseudomonas and will be continued on cefepime daily intravenously until 2022.  Routine PICC line care and remove PICC line after completion of IV antibiotics therapy.  2.  Patient does have history of CAD and has not been on any aspirin at home and has been initiated on low-dose aspirin therapy.  3.  Follow-up with primary care physician 1 week following discharge from rehab facility.    Discharge Diagnoses:     1.  Acute metabolic encephalopathy secondary to #2, POA.  2.  Acute acute multidrug-resistant Pseudomonas aeruginosa UTI with recent instrumentation, POA.  3.  Coronary artery disease status post CABG and stents.  4.  Benign prostatic hyperplasia.  5.  Essential hypertension.  6.  Acquired hypothyroidism.  7.  Parkinson's disease.  8.  Impaired mobility and ADLs.    Problem List:     Active Hospital Problems    Diagnosis  POA   • **Urinary tract infection without hematuria [N39.0]  Yes   • Benign prostatic hyperplasia with urinary retention [N40.1, R33.8]  Yes   • Hard of hearing [H91.90]  Yes   • Essential hypertension [I10]  Yes   • Hypothyroidism (acquired) [E03.9]  Yes      Resolved Hospital Problems    Diagnosis Date Resolved POA   • Acute metabolic encephalopathy [G93.41] 2022 Yes     Presenting Problem:    Chief Complaint   Patient presents with   • Altered Mental Status      Consults:     Consulting Physician(s)             None          Procedures Performed:    Right arm PICC line placed on 2022.    History of presenting illness/Hospital Course:    Mr. Rojas is an 86-year-old  male with history significant for Parkinson's disease, hypertension, hypothyroidism and BPH was admitted from the emergency room with altered mental status.  Patient's son reported that he called him earlier in the day and did not think that his dad was sounding his normal self.  He went to check on him and noticed that he was confused.  Patient recently underwent cystoscopy on 6/14 by Dr. Fuchs due to his BPH.  Of note, patient is extremely hard of hearing and is scheduled to get cochlear implants.    Labs significant for negative troponin, glucose 147, sodium 131, bicarb 20, chloride 97, creatinine 1.28, BUN 13.  CRP 4.4.  Lactate lipase and procalcitonin within normal limits.  WBC 24.  Hemoglobin hematocrit and platelets within normal limits.  Urinalysis positive for leukocytes, 31-50 WBC and trace bacteria.  CT abdomen pelvis shows gas within the bladder consistent with either infection or recent instrumentation. Chest x-ray shows no acute process.  Patient received 1 dose of Rocephin in the emergency room.    Patient was admitted to the medical floor and was continued on IV antibiotics therapy and IV fluids.  His mental status improved slowly over the next couple of days.  His urine culture did come back positive for multidrug-resistant Pseudomonas and his antibiotics therapy was changed to cefepime 6/18/2022.  Right arm PICC line was placed on 6/20/2022.  Patient was seen by physical and occupational therapy.  Case management was consulted for discharge placement and the patient will be going to short-term rehabilitation facility and hopefully will be discharged back to assisted living facility once his rehab is completed.  He will be continued on IV cefepime until 6/27/2022 through the PICC line in the right arm.  I tried calling the patient's son Bill over the phone but unfortunately there was no response.  I subsequently discussed the patient's condition and discharge plan with his sister Anya over the  phone.  Patient does have CAD and has had CABG and stents but does not seem to be on any antiplatelet agents and I will start him on low-dose aspirin therapy.  No other change has been made to his home medication regimen.  He is advised to follow-up with his primary care physician 1 week following discharge from rehab facility.    Vital Signs:    Temp:  [97.5 °F (36.4 °C)-98.7 °F (37.1 °C)] 98.5 °F (36.9 °C)  Heart Rate:  [64-72] 69  Resp:  [18-19] 18  BP: (131-150)/(52-83) 131/62    Physical Exam:    General Appearance:  Alert and cooperative.   Head:  Atraumatic and normocephalic.   Eyes: Conjunctivae and sclerae normal, no icterus. No pallor.   Ears:  Ears with no abnormalities noted.  Hard of hearing.   Throat: No oral lesions, no thrush, oral mucosa moist.   Neck: Supple, trachea midline, no thyromegaly.   Back:   No kyphoscoliosis present. No tenderness to palpation.   Lungs:   Breath sounds heard bilaterally equally.  No crackles or wheezing. No Pleural rub or bronchial breathing.   Heart:  Normal S1 and S2, no murmur, no gallop, no rub. No JVD.  CABG scar noted.   Abdomen:   Normal bowel sounds, no masses, no organomegaly. Soft, nontender, nondistended, no rebound tenderness.  Ecchymotic patch noted in the right lower quadrant.   Extremities: Supple, no edema, no cyanosis, no clubbing.  Right arm PICC line noted.   Pulses: Pulses palpable bilaterally.   Skin: No bleeding or rash.   Neurologic: Alert and oriented x 3. No facial asymmetry. Moves all four limbs. No tremors.     Pertinent Lab Results:     Results from last 7 days   Lab Units 06/20/22  0533 06/19/22  0711 06/18/22  0619 06/16/22  0553 06/15/22  2335   SODIUM mmol/L 134* 134* 137   < > 131*   POTASSIUM mmol/L 3.8 3.7 3.9   < > 4.2   CHLORIDE mmol/L 103 102 106   < > 97*   CO2 mmol/L 19.7* 20.1* 21.0*   < > 20.1*   BUN mg/dL 10 9 9   < > 13   CREATININE mg/dL 0.98 0.89 1.05   < > 1.28*   CALCIUM mg/dL 8.2* 8.5* 8.1*   < > 9.0   BILIRUBIN mg/dL  --    --   --   --  1.0   ALK PHOS U/L  --   --   --   --  92   ALT (SGPT) U/L  --   --   --   --  26   AST (SGOT) U/L  --   --   --   --  27   GLUCOSE mg/dL 121* 129* 116*   < > 147*    < > = values in this interval not displayed.     Results from last 7 days   Lab Units 06/20/22  0533 06/19/22  0711 06/18/22  0619   WBC 10*3/mm3 11.61* 11.84* 12.63*   HEMOGLOBIN g/dL 13.9 13.7 12.9*   HEMATOCRIT % 40.6 39.9 37.9   PLATELETS 10*3/mm3 209 197 173         Results from last 7 days   Lab Units 06/15/22  2335   TROPONIN T ng/mL <0.010     Results from last 7 days   Lab Units 06/15/22  2335   PROBNP pg/mL 310.8         Results from last 7 days   Lab Units 06/15/22  2335   LIPASE U/L 14         Results from last 7 days   Lab Units 06/16/22  0049 06/15/22  2350 06/15/22  2335   BLOODCX   --  No growth at 5 days No growth at 5 days   URINECX  >100,000 CFU/mL Pseudomonas aeruginosa MDRO*  --   --      Pertinent Radiology Results:    Imaging Results (All)     Procedure Component Value Units Date/Time    CT Abdomen Pelvis With Contrast [[id number]] Collected: 06/16/22 0300     Updated: 06/16/22 0302    Narrative:      FINAL REPORT    TECHNIQUE:  null    CLINICAL HISTORY:  ams recent  cysto    COMPARISON:  null    FINDINGS:  CT of the abdomen and pelvis after administration of IV contrast.    Technique: CT from the lung bases through the ischial tuberosities after administration of IV contrast    Comparison:    None    Findings: Normal lung bases.    Small hiatal hernia.    Normal liver. No masses.    The gallbladder is unremarkable by CT.    Normal appearing adrenal glands.    Normal spleen. No masses. The spleen appears normal in size.    Simple right renal cyst. Otherwise normal kidneys. No renal mass. No hydronephrosis.    Moderate distention of the rectum with stool. Diverticulosis of the colon, no diverticulitis. No pneumoperitoneum or abscess. No bowel obstruction. Normal appendix.    Normal pancreas. No  pancreatitis.    Normal retroperitoneal structures. No adenopathy. Normal caliber abdominal aorta.    Gas within the urinary bladder. This could be secondary to recent instrumentation versus infection.    Bilateral L5 spondylolysis.    Bilateral inguinal hernias containing only fat.      Impression:      Impression:    Gas within the urinary bladder. This could be secondary to recent instrumentation versus infection.    Bilateral inguinal hernias containing only fat.    Authenticated and Electronically Signed by Clifford Lee MD on  06/16/2022 03:00:51 AM    XR Chest 1 View [[id number]] Collected: 06/16/22 0112     Updated: 06/16/22 0114    Narrative:      FINAL REPORT    TECHNIQUE:  null    CLINICAL HISTORY:  ams    COMPARISON:  null    FINDINGS:  Single view of the chest    Comparison:    None    Findings: Cardiomegaly, no CHF. Otherwise normal heart, lungs and mediastinum. No pneumonia. Status post median sternotomy.      Impression:      Impression:    Cardiomegaly, no CHF.    Authenticated and Electronically Signed by Clifford Lee MD on  06/16/2022 01:12:04 AM    CT Head Without Contrast [[id number]] Collected: 06/16/22 0031     Updated: 06/16/22 0033    Narrative:      FINAL REPORT    TECHNIQUE:  null    CLINICAL HISTORY:  ams    COMPARISON:  null    FINDINGS:  CT HEAD WITHOUT IV CONTRAST:    COMPARISON:    No relevant priors are available for comparison    TECHNIQUE: Axial imaging of the head performed from the skull base to the vertex without IV contrast. Coronal reformations obtained.    FINDINGS:    There is diffuse atrophy. There is no mass, mass effect or midline shift. No abnormal extra-axial fluid collection or intracranial hemorrhage.    There are scattered deep white matter changes. No CT evidence for acute infarction. Visualized paranasal sinuses and mastoids are clear. There are no skull fractures.      Impression:      IMPRESSION:    No CT evidence for an acute intracranial  abnormality.    Senescent brain changes.    Authenticated and Electronically Signed by POLLY Ferreira MD on  06/16/2022 12:31:17 AM        Condition on Discharge:      Stable.    Code status during the hospital stay:    Code Status and Medical Interventions:   Ordered at: 06/16/22 0426     Code Status (Patient has no pulse and is not breathing):    CPR (Attempt to Resuscitate)     Medical Interventions (Patient has pulse or is breathing):    Full Support     Discharge Disposition:    Rehab Facility or Unit (DC - External)    Discharge Medications:       Discharge Medications      New Medications      Instructions Start Date   aspirin 81 MG EC tablet   81 mg, Oral, Daily      cefepime 2 gm IVPB in 100 ml NS (MBP)   2 g, Intravenous, Every 8 Hours         Continue These Medications      Instructions Start Date   finasteride 5 MG tablet  Commonly known as: PROSCAR   5 mg, Oral, Daily      levothyroxine 88 MCG tablet  Commonly known as: SYNTHROID, LEVOTHROID   100 mcg, Oral, Daily, Patient taking 100 mcg qd      lisinopril 2.5 MG tablet  Commonly known as: PRINIVIL,ZESTRIL   2.5 mg, Oral, Daily      nitroglycerin 0.4 MG SL tablet  Commonly known as: NITROSTAT   0.4 mg, Every 5 Minutes PRN      tamsulosin 0.4 MG capsule 24 hr capsule  Commonly known as: FLOMAX   1 capsule, Oral, 2 Times Daily         Stop These Medications    ondansetron 4 MG tablet  Commonly known as: ZOFRAN     prednisoLONE acetate 1 % ophthalmic suspension  Commonly known as: Pred Forte          Discharge Diet:     Diet Instructions     Diet: Cardiac; Thin      Discharge Diet: Cardiac    Fluid Consistency: Thin        Activity at Discharge:     Activity Instructions     Activity as Tolerated          Follow-up Appointments:     Follow-up Information     Karoline Aceves MD .    Specialty: Family Medicine  Contact information:  [address] [facility] [address] [phone]                       Test Results Pending at  Discharge:    None.       Jeremias Gan MD  06/21/22  10:33 EDT    Time: I spent 25 minutes on this discharge activity which included: face-to-face encounter with the patient, reviewing the data in the system, coordination of the care with the nursing staff as well as consultants, documentation, and entering orders.     Dictated utilizing Dragon dictation.

## 2022-06-22 ENCOUNTER — NURSING HOME (OUTPATIENT)
Dept: INTERNAL MEDICINE | Facility: CLINIC | Age: 87
End: 2022-06-22

## 2022-06-22 VITALS
HEART RATE: 74 BPM | OXYGEN SATURATION: 97 % | RESPIRATION RATE: 18 BRPM | TEMPERATURE: 98 F | SYSTOLIC BLOOD PRESSURE: 126 MMHG | DIASTOLIC BLOOD PRESSURE: 72 MMHG

## 2022-06-22 DIAGNOSIS — N40.1 BENIGN PROSTATIC HYPERPLASIA WITH URINARY RETENTION: ICD-10-CM

## 2022-06-22 DIAGNOSIS — E03.9 HYPOTHYROIDISM (ACQUIRED): ICD-10-CM

## 2022-06-22 DIAGNOSIS — R33.8 BENIGN PROSTATIC HYPERPLASIA WITH URINARY RETENTION: ICD-10-CM

## 2022-06-22 DIAGNOSIS — I10 ESSENTIAL HYPERTENSION: ICD-10-CM

## 2022-06-22 DIAGNOSIS — Z95.1 HX OF CABG: ICD-10-CM

## 2022-06-22 DIAGNOSIS — N30.00 ACUTE CYSTITIS WITHOUT HEMATURIA: Primary | ICD-10-CM

## 2022-06-22 PROCEDURE — 99305 1ST NF CARE MODERATE MDM 35: CPT | Performed by: INTERNAL MEDICINE

## 2022-06-22 NOTE — CASE MANAGEMENT/SOCIAL WORK
Case Management Discharge Note                Selected Continued Care - Discharged on 6/21/2022 Admission date: 6/15/2022 - Discharge disposition: Rehab Facility or Unit (DC - External)    Destination    No services have been selected for the patient.              Durable Medical Equipment    No services have been selected for the patient.              Dialysis/Infusion    No services have been selected for the patient.              Home Medical Care    No services have been selected for the patient.              Therapy    No services have been selected for the patient.              Community Resources    No services have been selected for the patient.              Community & DME    No services have been selected for the patient.                       Final Discharge Disposition Code: 03 - skilled nursing facility (SNF)

## 2022-06-24 ENCOUNTER — NURSING HOME (OUTPATIENT)
Dept: FAMILY MEDICINE CLINIC | Facility: CLINIC | Age: 87
End: 2022-06-24

## 2022-06-24 DIAGNOSIS — N30.00 ACUTE CYSTITIS WITHOUT HEMATURIA: Primary | ICD-10-CM

## 2022-06-24 DIAGNOSIS — Z86.69 HISTORY OF ENCEPHALOPATHY: ICD-10-CM

## 2022-06-24 DIAGNOSIS — R33.8 BENIGN PROSTATIC HYPERPLASIA WITH URINARY RETENTION: ICD-10-CM

## 2022-06-24 DIAGNOSIS — N40.1 BENIGN PROSTATIC HYPERPLASIA WITH URINARY RETENTION: ICD-10-CM

## 2022-06-24 PROCEDURE — 99310 SBSQ NF CARE HIGH MDM 45: CPT | Performed by: NURSE PRACTITIONER

## 2022-06-24 NOTE — PROGRESS NOTES
"  Nursing Home History and Physical       Juvencio DO Lara []  DIANE Castellon []  [address] [place] [address]  Phone: [phone]  Fax: [phone] Kenya Rodriguez MD []  Anil Villavicencio DO [x]   [address] [place] [address]  Phone: [phone]  Fax: [phone]     PATIENT NAME: Remington Rojas                                                                          YOB: 1935           DATE OF SERVICE: 06/22/2022  FACILITY:  []  [facility]   [] [facility] [facility]    [x] [facility]   [] [facility]   []  [facility]   []  Other ______________________________________________________________________    CHIEF COMPLAINT:  Nursing facility admission      HISTORY OF PRESENT ILLNESS:   Patient white male with hypertension, BPH, and coronary artery disease who was recently hospitalized at [facility] for acute UTI secondary to MDR Pseudomonas.  He initially presented with concerns of confusion and noted that he recently had cystoscopy with Dr. Fuchs on 6/14. Patient was initiated on treatment with cefepime for which she will need via PICC line until 6/27/2022.  Patient was also started on aspirin as he had history of CAD s/p CABG.     On exam today, patient was resting comfortably in his bed.  He was hard of hearing but was able to share that he felt well and had no specific complaints or concerns.  He was to be evaluated with physical therapy later today.  He seemed motivated and cooperative.    PAST MEDICAL & SURGICAL HISTORY:   Past Medical History:   Diagnosis Date   • Acute myocardial infarction (HCC)     \"ABOUT 8-10 YEARS AGO\" PATIENT REPORTS   • BPH (benign prostatic hyperplasia)    • CAD (coronary artery disease)    • Elevated cholesterol     REPORTS HAS BEEN ON MEDICATION IN THE PAST   • GERD without esophagitis    • [place] (hard of hearing)    • Hyperlipidemia     UNSPECIFIED   • Hypertension    • Hypothyroidism    • Impaired functional mobility, balance, " "gait, and endurance    • Overactive bladder    • Seasonal allergies    • Wears dentures       Past Surgical History:   Procedure Laterality Date   • CARDIAC CATHETERIZATION      REPORTS \"ABOUT 8-10 YEARS AGO\" HAD 2 STENTS PLACED   • CATARACT EXTRACTION W/ INTRAOCULAR LENS IMPLANT Right 6/21/2021    Procedure: CATARACT PHACO EXTRACTION WITH INTRAOCULAR LENS IMPLANT RIGHT COMLEX W/ MALYUGIN RING;  Surgeon: Td Palmer MD;  Location: [facility];  Service: Ophthalmology;  Laterality: Right;   • CATARACT EXTRACTION W/ INTRAOCULAR LENS IMPLANT Left 7/19/2021    Procedure: CATARACT PHACO EXTRACTION WITH INTRAOCULAR LENS IMPLANT LEFT COMPLEX W/ MALYUGIN RING;  Surgeon: Td Palmer MD;  Location: [facility] OR;  Service: Ophthalmology;  Laterality: Left;   • CORONARY ARTERY BYPASS GRAFT  2006   • HERNIA REPAIR      BILATERAL INGUINAL HERNIA REPAIRS WITH MESH WITHIN 3 YEARS   • MOUTH SURGERY      FULL TEETH EXTRACTION   • ORIF WRIST FRACTURE Right 6/22/2018    Procedure: OPEN REDUCTION INTERNAL FIXATION WITH VOLAR PLATE (SYNTHES), RIGHT WRIST;  Surgeon: Sen Flanagan MD;  Location: [facility];  Service: Orthopedics   • OTHER SURGICAL HISTORY      INGUINAL HERNIA REPAIR BILATERAL   • OTHER SURGICAL HISTORY      PREVIOUS STENT PLACEMENT         MEDICATIONS:  I have reviewed and reconciled the patients medication list in the patients chart at the [facility] on 06/22/2022.      ALLERGIES:  Allergies   Allergen Reactions   • Metoprolol Unknown (See Comments)     PATIENT DOES NOT RECALL ALLERGY REACTION OR TYPE, BUT DR BROWN (CARDIOTHORACIC SURGEON) TOLD HIM NOT TO TAKE IT.         SOCIAL HISTORY:  Social History     Socioeconomic History   • Marital status:    Tobacco Use   • Smoking status: Former Smoker   • Smokeless tobacco: Never Used   Substance and Sexual Activity   • Alcohol use: No   • Drug use: No   • Sexual activity: Defer       FAMILY HISTORY:  Family History   Problem Relation Age of " Onset   • Coronary artery disease Mother    • Coronary artery disease Sister         REVIEW OF SYSTEMS:  Review of Systems   Constitutional: Negative for chills, fatigue and fever.   HENT: Negative for congestion, ear pain, rhinorrhea, sinus pressure and sore throat.    Eyes: Negative for visual disturbance.   Respiratory: Negative for cough, chest tightness, shortness of breath and wheezing.    Cardiovascular: Negative for chest pain, palpitations and leg swelling.   Gastrointestinal: Negative for abdominal pain, blood in stool, constipation, diarrhea, nausea and vomiting.   Endocrine: Negative for polydipsia and polyuria.   Genitourinary: Negative for dysuria and hematuria.   Musculoskeletal: Negative for arthralgias and back pain.   Skin: Negative for rash.   Neurological: Negative for dizziness, light-headedness, numbness and headaches.   Psychiatric/Behavioral: Negative for dysphoric mood and sleep disturbance. The patient is not nervous/anxious.          PHYSICAL EXAMINATION:   VITAL SIGNS: /72   Pulse 74   Temp 98 °F (36.7 °C)   Resp 18   SpO2 97%     Physical Exam  Vitals and nursing note reviewed.   Constitutional:       Appearance: Normal appearance. He is well-developed.   HENT:      Head: Normocephalic and atraumatic.      Ears:      Comments: Hard of hearing     Nose: Nose normal.      Mouth/Throat:      Mouth: Mucous membranes are moist.      Pharynx: No oropharyngeal exudate.   Eyes:      General: No scleral icterus.     Conjunctiva/sclera: Conjunctivae normal.      Pupils: Pupils are equal, round, and reactive to light.   Neck:      Thyroid: No thyromegaly.   Cardiovascular:      Rate and Rhythm: Normal rate and regular rhythm.      Heart sounds: Normal heart sounds. No murmur heard.    No friction rub. No gallop.   Pulmonary:      Effort: Pulmonary effort is normal. No respiratory distress.      Breath sounds: Normal breath sounds. No wheezing.   Abdominal:      General: Bowel sounds are  normal. There is no distension.      Palpations: Abdomen is soft.      Tenderness: There is no abdominal tenderness.   Musculoskeletal:         General: No deformity or signs of injury.      Cervical back: Normal range of motion and neck supple.   Lymphadenopathy:      Cervical: No cervical adenopathy.   Skin:     General: Skin is warm and dry.      Findings: No rash.   Neurological:      Mental Status: He is alert and oriented to person, place, and time.   Psychiatric:         Mood and Affect: Mood normal.         Behavior: Behavior normal.         RECORDS REVIEW:   [facility]  Results for orders placed or performed during the hospital encounter of 06/15/22   Blood Culture - Blood, Hand, Right    Specimen: Hand, Right; Blood   Result Value Ref Range    Blood Culture No growth at 5 days    Blood Culture - Blood, Arm, Right    Specimen: Arm, Right; Blood   Result Value Ref Range    Blood Culture No growth at 5 days    Urine Culture - Urine, Urine, Catheter In/Out    Specimen: Urine, Catheter In/Out   Result Value Ref Range    Urine Culture >100,000 CFU/mL Pseudomonas aeruginosa MDRO (C)        Susceptibility    Pseudomonas aeruginosa MDRO - J LUIS*     Cefepime  Susceptible ug/ml     Ceftazidime  Susceptible ug/ml     Ciprofloxacin  Resistant ug/ml     Gentamicin  Intermediate ug/ml     Levofloxacin  Resistant ug/ml     Meropenem  Intermediate ug/ml     Piperacillin + Tazobactam  Susceptible ug/ml     Tobramycin  Susceptible ug/ml     * For MDR Pseudomonas infections, susceptibility results may not correlate to clinical outcomes. Please consider infectious disease consult.   Acinetobacter Screen - Swab, Axilla, Right    Specimen: Axilla, Right; Swab   Result Value Ref Range    Acinetobacter Screen CX No Acinetobacter isolated    VRE Culture - Swab, Per Rectum    Specimen: Per Rectum; Swab   Result Value Ref Range    VRE Screen CX No Vancomycin Resistant Enterococcus Isolated     KASI AURIS SCREEN - Swab, Axilla Right, Axilla Left and Groin    Specimen: Axilla Right, Axilla Left and Groin; Swab   Result Value Ref Range    Candida Auris Screen Culture No Candida auris isolated at 5 days    MRSA Screen, PCR (Inpatient) - Swab, Nares    Specimen: Nares; Swab   Result Value Ref Range    MRSA PCR No MRSA Detected No MRSA Detected   COVID-19,[facility] IN-HOUSE,Nasal Swab No Transport Media 3-4 HR TAT - Swab, Nasal Cavity    Specimen: Nasal Cavity; Swab   Result Value Ref Range    COVID19 Not Detected Not Detected - Ref. Range   COVID-19,[facility] IN-HOUSE,Nasal Swab No Transport Media 3-4 HR TAT - Swab, Nasal Cavity    Specimen: Nasal Cavity; Swab   Result Value Ref Range    COVID19 Not Detected Not Detected - Ref. Range   Comprehensive Metabolic Panel    Specimen: Blood   Result Value Ref Range    Glucose 147 (H) 65 - 99 mg/dL    BUN 13 8 - 23 mg/dL    Creatinine 1.28 (H) 0.76 - 1.27 mg/dL    Sodium 131 (L) 136 - 145 mmol/L    Potassium 4.2 3.5 - 5.2 mmol/L    Chloride 97 (L) 98 - 107 mmol/L    CO2 20.1 (L) 22.0 - 29.0 mmol/L    Calcium 9.0 8.6 - 10.5 mg/dL    Total Protein 7.9 6.0 - 8.5 g/dL    Albumin 4.30 3.50 - 5.20 g/dL    ALT (SGPT) 26 1 - 41 U/L    AST (SGOT) 27 1 - 40 U/L    Alkaline Phosphatase 92 39 - 117 U/L    Total Bilirubin 1.0 0.0 - 1.2 mg/dL    Globulin 3.6 gm/dL    A/G Ratio 1.2 g/dL    BUN/Creatinine Ratio 10.2 7.0 - 25.0    Anion Gap 13.9 5.0 - 15.0 mmol/L    eGFR 55.2 (L) >60.0 mL/min/1.73   Lipase    Specimen: Blood   Result Value Ref Range    Lipase 14 13 - 60 U/L   Urinalysis With Culture If Indicated - Urine, Catheter In/Out    Specimen: Urine, Catheter In/Out   Result Value Ref Range    Color, UA Yellow Yellow, Straw    Appearance, UA Clear Clear    pH, UA 6.5 5.0 - 8.0    Specific Gravity, UA 1.021 1.005 - 1.030    Glucose, UA Negative Negative    Ketones, UA Trace (A) Negative    Bilirubin, UA Negative Negative    Blood, UA Small (1+) (A) Negative    Protein, UA  Trace (A) Negative    Leuk Esterase, UA Small (1+) (A) Negative    Nitrite, UA Negative Negative    Urobilinogen, UA 1.0 E.U./dL 0.2 - 1.0 E.U./dL   Troponin    Specimen: Blood   Result Value Ref Range    Troponin T <0.010 0.000 - 0.030 ng/mL   BNP    Specimen: Blood   Result Value Ref Range    proBNP 310.8 0.0 - 1,800.0 pg/mL   Lactic Acid, Plasma    Specimen: Blood   Result Value Ref Range    Lactate 1.4 0.5 - 2.0 mmol/L   Procalcitonin    Specimen: Blood   Result Value Ref Range    Procalcitonin 0.09 0.00 - 0.25 ng/mL   C-reactive Protein    Specimen: Blood   Result Value Ref Range    C-Reactive Protein 4.40 (H) 0.00 - 0.50 mg/dL   Ammonia    Specimen: Blood   Result Value Ref Range    Ammonia 13 (L) 16 - 60 umol/L   CBC Auto Differential    Specimen: Blood   Result Value Ref Range    WBC 23.68 (H) 3.40 - 10.80 10*3/mm3    RBC 4.69 4.14 - 5.80 10*6/mm3    Hemoglobin 15.2 13.0 - 17.7 g/dL    Hematocrit 44.6 37.5 - 51.0 %    MCV 95.1 79.0 - 97.0 fL    MCH 32.4 26.6 - 33.0 pg    MCHC 34.1 31.5 - 35.7 g/dL    RDW 11.9 (L) 12.3 - 15.4 %    RDW-SD 40.8 37.0 - 54.0 fl    MPV 9.7 6.0 - 12.0 fL    Platelets 187 140 - 450 10*3/mm3    Neutrophil % 57.0 42.7 - 76.0 %    Lymphocyte % 10.5 (L) 19.6 - 45.3 %    Monocyte % 7.1 5.0 - 12.0 %    Eosinophil % 24.5 (H) 0.3 - 6.2 %    Basophil % 0.4 0.0 - 1.5 %    Immature Grans % 0.5 0.0 - 0.5 %    Neutrophils, Absolute 13.49 (H) 1.70 - 7.00 10*3/mm3    Lymphocytes, Absolute 2.48 0.70 - 3.10 10*3/mm3    Monocytes, Absolute 1.69 (H) 0.10 - 0.90 10*3/mm3    Eosinophils, Absolute 5.80 (H) 0.00 - 0.40 10*3/mm3    Basophils, Absolute 0.10 0.00 - 0.20 10*3/mm3    Immature Grans, Absolute 0.12 (H) 0.00 - 0.05 10*3/mm3    nRBC 0.0 0.0 - 0.2 /100 WBC   Scan Slide    Specimen: Blood   Result Value Ref Range    Platelet Estimate Adequate Normal    Scan Slide     Manual Differential    Specimen: Blood   Result Value Ref Range    Neutrophil % 82.0 (H) 42.7 - 76.0 %    Lymphocyte % 14.0 (L) 19.6 -  45.3 %    Monocyte % 3.0 (L) 5.0 - 12.0 %    Bands %  1.0 0.0 - 5.0 %    Neutrophils Absolute 19.65 (H) 1.70 - 7.00 10*3/mm3    Lymphocytes Absolute 3.32 (H) 0.70 - 3.10 10*3/mm3    Monocytes Absolute 0.71 0.10 - 0.90 10*3/mm3    RBC Morphology Normal Normal    WBC Morphology Normal Normal    Platelet Estimate Adequate Normal   Urinalysis, Microscopic Only - Urine, Catheter In/Out    Specimen: Urine, Catheter In/Out   Result Value Ref Range    RBC, UA 6-12 (A) None Seen /HPF    WBC, UA 31-50 (A) None Seen /HPF    Bacteria, UA Trace (A) None Seen /HPF    Squamous Epithelial Cells, UA None Seen None Seen, 0-2 /HPF    Hyaline Casts, UA None Seen None Seen /LPF    Methodology Manual Light Microscopy    Basic Metabolic Panel    Specimen: Blood   Result Value Ref Range    Glucose 126 (H) 65 - 99 mg/dL    BUN 10 8 - 23 mg/dL    Creatinine 1.06 0.76 - 1.27 mg/dL    Sodium 136 136 - 145 mmol/L    Potassium 4.2 3.5 - 5.2 mmol/L    Chloride 105 98 - 107 mmol/L    CO2 17.0 (L) 22.0 - 29.0 mmol/L    Calcium 8.1 (L) 8.6 - 10.5 mg/dL    BUN/Creatinine Ratio 9.4 7.0 - 25.0    Anion Gap 14.0 5.0 - 15.0 mmol/L    eGFR 69.2 >60.0 mL/min/1.73   CBC Auto Differential    Specimen: Blood   Result Value Ref Range    WBC 23.74 (H) 3.40 - 10.80 10*3/mm3    RBC 4.24 4.14 - 5.80 10*6/mm3    Hemoglobin 13.6 13.0 - 17.7 g/dL    Hematocrit 41.0 37.5 - 51.0 %    MCV 96.7 79.0 - 97.0 fL    MCH 32.1 26.6 - 33.0 pg    MCHC 33.2 31.5 - 35.7 g/dL    RDW 11.9 (L) 12.3 - 15.4 %    RDW-SD 41.9 37.0 - 54.0 fl    MPV 9.7 6.0 - 12.0 fL    Platelets 168 140 - 450 10*3/mm3    Neutrophil % 80.2 (H) 42.7 - 76.0 %    Lymphocyte % 10.9 (L) 19.6 - 45.3 %    Monocyte % 7.7 5.0 - 12.0 %    Eosinophil % 0.0 (L) 0.3 - 6.2 %    Basophil % 0.4 0.0 - 1.5 %    Immature Grans % 0.8 (H) 0.0 - 0.5 %    Neutrophils, Absolute 19.05 (H) 1.70 - 7.00 10*3/mm3    Lymphocytes, Absolute 2.58 0.70 - 3.10 10*3/mm3    Monocytes, Absolute 1.83 (H) 0.10 - 0.90 10*3/mm3    Eosinophils,  Absolute 0.00 0.00 - 0.40 10*3/mm3    Basophils, Absolute 0.09 0.00 - 0.20 10*3/mm3    Immature Grans, Absolute 0.19 (H) 0.00 - 0.05 10*3/mm3    nRBC 0.0 0.0 - 0.2 /100 WBC   Basic Metabolic Panel    Specimen: Blood   Result Value Ref Range    Glucose 111 (H) 65 - 99 mg/dL    BUN 10 8 - 23 mg/dL    Creatinine 0.95 0.76 - 1.27 mg/dL    Sodium 133 (L) 136 - 145 mmol/L    Potassium 4.0 3.5 - 5.2 mmol/L    Chloride 105 98 - 107 mmol/L    CO2 16.8 (L) 22.0 - 29.0 mmol/L    Calcium 7.8 (L) 8.6 - 10.5 mg/dL    BUN/Creatinine Ratio 10.5 7.0 - 25.0    Anion Gap 11.2 5.0 - 15.0 mmol/L    eGFR 78.9 >60.0 mL/min/1.73   CBC Auto Differential    Specimen: Blood   Result Value Ref Range    WBC 18.94 (H) 3.40 - 10.80 10*3/mm3    RBC 4.01 (L) 4.14 - 5.80 10*6/mm3    Hemoglobin 12.6 (L) 13.0 - 17.7 g/dL    Hematocrit 38.0 37.5 - 51.0 %    MCV 94.8 79.0 - 97.0 fL    MCH 31.4 26.6 - 33.0 pg    MCHC 33.2 31.5 - 35.7 g/dL    RDW 11.6 (L) 12.3 - 15.4 %    RDW-SD 40.1 37.0 - 54.0 fl    MPV 10.0 6.0 - 12.0 fL    Platelets 152 140 - 450 10*3/mm3    Neutrophil % 79.4 (H) 42.7 - 76.0 %    Lymphocyte % 12.8 (L) 19.6 - 45.3 %    Monocyte % 6.4 5.0 - 12.0 %    Eosinophil % 0.3 0.3 - 6.2 %    Basophil % 0.3 0.0 - 1.5 %    Immature Grans % 0.8 (H) 0.0 - 0.5 %    Neutrophils, Absolute 15.03 (H) 1.70 - 7.00 10*3/mm3    Lymphocytes, Absolute 2.43 0.70 - 3.10 10*3/mm3    Monocytes, Absolute 1.21 (H) 0.10 - 0.90 10*3/mm3    Eosinophils, Absolute 0.05 0.00 - 0.40 10*3/mm3    Basophils, Absolute 0.06 0.00 - 0.20 10*3/mm3    Immature Grans, Absolute 0.16 (H) 0.00 - 0.05 10*3/mm3    nRBC 0.0 0.0 - 0.2 /100 WBC   Basic Metabolic Panel    Specimen: Blood   Result Value Ref Range    Glucose 116 (H) 65 - 99 mg/dL    BUN 9 8 - 23 mg/dL    Creatinine 1.05 0.76 - 1.27 mg/dL    Sodium 137 136 - 145 mmol/L    Potassium 3.9 3.5 - 5.2 mmol/L    Chloride 106 98 - 107 mmol/L    CO2 21.0 (L) 22.0 - 29.0 mmol/L    Calcium 8.1 (L) 8.6 - 10.5 mg/dL    BUN/Creatinine Ratio  8.6 7.0 - 25.0    Anion Gap 10.0 5.0 - 15.0 mmol/L    eGFR 70.0 >60.0 mL/min/1.73   CBC Auto Differential    Specimen: Blood   Result Value Ref Range    WBC 12.63 (H) 3.40 - 10.80 10*3/mm3    RBC 3.99 (L) 4.14 - 5.80 10*6/mm3    Hemoglobin 12.9 (L) 13.0 - 17.7 g/dL    Hematocrit 37.9 37.5 - 51.0 %    MCV 95.0 79.0 - 97.0 fL    MCH 32.3 26.6 - 33.0 pg    MCHC 34.0 31.5 - 35.7 g/dL    RDW 11.8 (L) 12.3 - 15.4 %    RDW-SD 40.8 37.0 - 54.0 fl    MPV 10.0 6.0 - 12.0 fL    Platelets 173 140 - 450 10*3/mm3    Neutrophil % 73.5 42.7 - 76.0 %    Lymphocyte % 15.2 (L) 19.6 - 45.3 %    Monocyte % 8.4 5.0 - 12.0 %    Eosinophil % 1.3 0.3 - 6.2 %    Basophil % 0.6 0.0 - 1.5 %    Immature Grans % 1.0 (H) 0.0 - 0.5 %    Neutrophils, Absolute 9.29 (H) 1.70 - 7.00 10*3/mm3    Lymphocytes, Absolute 1.92 0.70 - 3.10 10*3/mm3    Monocytes, Absolute 1.06 (H) 0.10 - 0.90 10*3/mm3    Eosinophils, Absolute 0.16 0.00 - 0.40 10*3/mm3    Basophils, Absolute 0.08 0.00 - 0.20 10*3/mm3    Immature Grans, Absolute 0.12 (H) 0.00 - 0.05 10*3/mm3    nRBC 0.0 0.0 - 0.2 /100 WBC   Basic Metabolic Panel    Specimen: Blood   Result Value Ref Range    Glucose 129 (H) 65 - 99 mg/dL    BUN 9 8 - 23 mg/dL    Creatinine 0.89 0.76 - 1.27 mg/dL    Sodium 134 (L) 136 - 145 mmol/L    Potassium 3.7 3.5 - 5.2 mmol/L    Chloride 102 98 - 107 mmol/L    CO2 20.1 (L) 22.0 - 29.0 mmol/L    Calcium 8.5 (L) 8.6 - 10.5 mg/dL    BUN/Creatinine Ratio 10.1 7.0 - 25.0    Anion Gap 11.9 5.0 - 15.0 mmol/L    eGFR 84.5 >60.0 mL/min/1.73   CBC Auto Differential    Specimen: Blood   Result Value Ref Range    WBC 11.84 (H) 3.40 - 10.80 10*3/mm3    RBC 4.26 4.14 - 5.80 10*6/mm3    Hemoglobin 13.7 13.0 - 17.7 g/dL    Hematocrit 39.9 37.5 - 51.0 %    MCV 93.7 79.0 - 97.0 fL    MCH 32.2 26.6 - 33.0 pg    MCHC 34.3 31.5 - 35.7 g/dL    RDW 11.8 (L) 12.3 - 15.4 %    RDW-SD 40.5 37.0 - 54.0 fl    MPV 9.9 6.0 - 12.0 fL    Platelets 197 140 - 450 10*3/mm3    Neutrophil % 69.5 42.7 - 76.0 %     Lymphocyte % 17.9 (L) 19.6 - 45.3 %    Monocyte % 9.3 5.0 - 12.0 %    Eosinophil % 1.1 0.3 - 6.2 %    Basophil % 0.8 0.0 - 1.5 %    Immature Grans % 1.4 (H) 0.0 - 0.5 %    Neutrophils, Absolute 8.23 (H) 1.70 - 7.00 10*3/mm3    Lymphocytes, Absolute 2.12 0.70 - 3.10 10*3/mm3    Monocytes, Absolute 1.10 (H) 0.10 - 0.90 10*3/mm3    Eosinophils, Absolute 0.13 0.00 - 0.40 10*3/mm3    Basophils, Absolute 0.09 0.00 - 0.20 10*3/mm3    Immature Grans, Absolute 0.17 (H) 0.00 - 0.05 10*3/mm3    nRBC 0.0 0.0 - 0.2 /100 WBC   Basic Metabolic Panel    Specimen: Blood   Result Value Ref Range    Glucose 121 (H) 65 - 99 mg/dL    BUN 10 8 - 23 mg/dL    Creatinine 0.98 0.76 - 1.27 mg/dL    Sodium 134 (L) 136 - 145 mmol/L    Potassium 3.8 3.5 - 5.2 mmol/L    Chloride 103 98 - 107 mmol/L    CO2 19.7 (L) 22.0 - 29.0 mmol/L    Calcium 8.2 (L) 8.6 - 10.5 mg/dL    BUN/Creatinine Ratio 10.2 7.0 - 25.0    Anion Gap 11.3 5.0 - 15.0 mmol/L    eGFR 76.0 >60.0 mL/min/1.73   CBC Auto Differential    Specimen: Blood   Result Value Ref Range    WBC 11.61 (H) 3.40 - 10.80 10*3/mm3    RBC 4.34 4.14 - 5.80 10*6/mm3    Hemoglobin 13.9 13.0 - 17.7 g/dL    Hematocrit 40.6 37.5 - 51.0 %    MCV 93.5 79.0 - 97.0 fL    MCH 32.0 26.6 - 33.0 pg    MCHC 34.2 31.5 - 35.7 g/dL    RDW 11.5 (L) 12.3 - 15.4 %    RDW-SD 39.5 37.0 - 54.0 fl    MPV 10.1 6.0 - 12.0 fL    Platelets 209 140 - 450 10*3/mm3    Neutrophil % 56.9 42.7 - 76.0 %    Lymphocyte % 24.6 19.6 - 45.3 %    Monocyte % 12.7 (H) 5.0 - 12.0 %    Eosinophil % 1.9 0.3 - 6.2 %    Basophil % 1.2 0.0 - 1.5 %    Immature Grans % 2.7 (H) 0.0 - 0.5 %    Neutrophils, Absolute 6.60 1.70 - 7.00 10*3/mm3    Lymphocytes, Absolute 2.86 0.70 - 3.10 10*3/mm3    Monocytes, Absolute 1.48 (H) 0.10 - 0.90 10*3/mm3    Eosinophils, Absolute 0.22 0.00 - 0.40 10*3/mm3    Basophils, Absolute 0.14 0.00 - 0.20 10*3/mm3    Immature Grans, Absolute 0.31 (H) 0.00 - 0.05 10*3/mm3    nRBC 0.0 0.0 - 0.2 /100 WBC        ASSESSMENT    Diagnoses and all orders for this visit:    1. Acute cystitis without hematuria (Primary)    2. Benign prostatic hyperplasia with urinary retention    3. Essential hypertension    4. Hypothyroidism (acquired)    5. Hx of CABG        PLAN  MDR Pseudomonas UTI  - cont cefepime 2g daily x 7 days (stop date 6/27/22)  - cultures from hospital reviewed.  - AMS related to UTI has resolved.  - follow up CBC and CMP on admission.      Presbycusis   - cont hearing aides    Essential hypertension   - BP remains well controlled on lisinopril    Hypothyroidism  - cont synthroid, follow up TSH with admission labs.     BPH  - cont Proscar and tamsulosin  - following with Dr. Fuchs, s/p cystoscopy 6/24    Debility and weakness  - start PT for strengthening, cont short term rehab stay.     [x]  Discussed Patient in detail with nursing/staff, addressed all needs today.     [x]  Plan of Care Reviewed   [x]  PT/OT Reviewed   [x]  Order Changes  []  Discharge Plans Reviewed  [x]  Advance Directive on file with Nursing Home.   [x]  POA on file with Nursing Home.    [x]  Code Status listed and reviewed.       Anil Villavicencio DO.  6/23/2022      **Part of this note may be an electronic transcription/translation of spoken language to printed text using the Dragon Dictation System.**

## 2022-06-27 VITALS
WEIGHT: 186 LBS | HEART RATE: 74 BPM | RESPIRATION RATE: 18 BRPM | DIASTOLIC BLOOD PRESSURE: 72 MMHG | TEMPERATURE: 98 F | BODY MASS INDEX: 27.47 KG/M2 | OXYGEN SATURATION: 97 % | SYSTOLIC BLOOD PRESSURE: 126 MMHG

## 2022-06-27 NOTE — PROGRESS NOTES
"Nursing Home Follow Up Note      Juvencio Bermudez DO []   DIANE Castellon [x]  [address] [place] [address]  Phone: [phone]  Fax: [phone] Kenya Rodriguez MD []    Anil Villavicencio DO []   [address] [place] [address]  Phone: [phone]  Fax: [phone]     PATIENT NAME: Remington Rojas                                                                          YOB: 1935           DATE OF SERVICE: 06/24/2022  FACILITY:  [][facility]   [] [place]   [x] [facility]   [] [facility]   [] Other ______________________________________________________________________      CHIEF COMPLAINT:      Medication and chart review.    HISTORY OF PRESENT ILLNESS:     Patient is an 87 yo man admitted to facility for rehabilitation and strengthening, and antibiotics for treatment of UTI. He has past medical history significant for Parkinson's disease, hypertension,hypothyroidism and BPH.  He initially presented to the hospital with mental status changes and was diagnosed with Pseudomonas UTI with encephalopathy.  He was treated with IV cefepime and will continue this in the facility until 6/28.  He does have PICC line.  There are plans for him to be able to discharge back to assisted living once antibiotics complete and he is reached goals with therapy for rehabilitation and strengthening.    PAST MEDICAL & SURGICAL HISTORY:   Past Medical History:   Diagnosis Date   • Acute myocardial infarction (HCC)     \"ABOUT 8-10 YEARS AGO\" PATIENT REPORTS   • BPH (benign prostatic hyperplasia)    • CAD (coronary artery disease)    • Elevated cholesterol     REPORTS HAS BEEN ON MEDICATION IN THE PAST   • GERD without esophagitis    • [place] (hard of hearing)    • Hyperlipidemia     UNSPECIFIED   • Hypertension    • Hypothyroidism    • Impaired functional mobility, balance, gait, and endurance    • Overactive bladder    • Seasonal allergies    • Wears dentures       Past Surgical History:   Procedure " "Laterality Date   • CARDIAC CATHETERIZATION      REPORTS \"ABOUT 8-10 YEARS AGO\" HAD 2 STENTS PLACED   • CATARACT EXTRACTION W/ INTRAOCULAR LENS IMPLANT Right 6/21/2021    Procedure: CATARACT PHACO EXTRACTION WITH INTRAOCULAR LENS IMPLANT RIGHT COMLEX W/ MALYUGIN RING;  Surgeon: Td Palmer MD;  Location: [facility];  Service: Ophthalmology;  Laterality: Right;   • CATARACT EXTRACTION W/ INTRAOCULAR LENS IMPLANT Left 7/19/2021    Procedure: CATARACT PHACO EXTRACTION WITH INTRAOCULAR LENS IMPLANT LEFT COMPLEX W/ MALYUGIN RING;  Surgeon: Td Palmer MD;  Location: [facility];  Service: Ophthalmology;  Laterality: Left;   • CORONARY ARTERY BYPASS GRAFT  2006   • HERNIA REPAIR      BILATERAL INGUINAL HERNIA REPAIRS WITH MESH WITHIN 3 YEARS   • MOUTH SURGERY      FULL TEETH EXTRACTION   • ORIF WRIST FRACTURE Right 6/22/2018    Procedure: OPEN REDUCTION INTERNAL FIXATION WITH VOLAR PLATE (SYNTHES), RIGHT WRIST;  Surgeon: Sen Flanagan MD;  Location: [facility];  Service: Orthopedics   • OTHER SURGICAL HISTORY      INGUINAL HERNIA REPAIR BILATERAL   • OTHER SURGICAL HISTORY      PREVIOUS STENT PLACEMENT         MEDICATIONS:  I have reviewed and reconciled the patients medication list in the patients chart at the skilled nursing facility today.      ALLERGIES:    Allergies   Allergen Reactions   • Metoprolol Unknown (See Comments)     PATIENT DOES NOT RECALL ALLERGY REACTION OR TYPE, BUT DR BROWN (CARDIOTHORACIC SURGEON) TOLD HIM NOT TO TAKE IT.         SOCIAL HISTORY:    Social History     Socioeconomic History   • Marital status:    Tobacco Use   • Smoking status: Former Smoker   • Smokeless tobacco: Never Used   Substance and Sexual Activity   • Alcohol use: No   • Drug use: No   • Sexual activity: Defer       FAMILY HISTORY:    Family History   Problem Relation Age of Onset   • Coronary artery disease Mother    • Coronary artery disease Sister        REVIEW OF SYSTEMS:    Review of Systems "   Constitutional: Negative for activity change, appetite change, chills, diaphoresis, fatigue, fever, unexpected weight gain and unexpected weight loss.   HENT: Negative for congestion, ear pain, mouth sores, nosebleeds, postnasal drip, rhinorrhea, sinus pressure, sneezing, sore throat, swollen glands and trouble swallowing.    Eyes: Negative for pain, discharge, redness and itching.   Respiratory: Negative for apnea, cough, choking, chest tightness, shortness of breath, wheezing and stridor.    Cardiovascular: Negative for chest pain, palpitations and leg swelling.   Gastrointestinal: Negative for abdominal distention, abdominal pain, blood in stool, constipation, diarrhea, nausea, vomiting, GERD and indigestion.   Endocrine: Negative for polydipsia and polyuria.   Genitourinary: Negative for decreased urine volume, difficulty urinating, dysuria, flank pain, frequency, hematuria, urgency and urinary incontinence.   Musculoskeletal: Positive for arthralgias. Negative for back pain, gait problem, joint swelling and myalgias.   Skin: Negative for color change, dry skin, rash and skin lesions.   Allergic/Immunologic: Negative for environmental allergies.   Neurological: Positive for weakness (LE). Negative for dizziness, seizures, speech difficulty, memory problem and confusion.   Psychiatric/Behavioral: Negative for behavioral problems, dysphoric mood, hallucinations, sleep disturbance and depressed mood. The patient is not nervous/anxious.          PHYSICAL EXAMINATION:   VITAL SIGNS:   Vitals:    06/24/22 0904   BP: 126/72   Pulse: 74   Resp: 18   Temp: 98 °F (36.7 °C)   SpO2: 97%   Weight: 84.4 kg (186 lb)       Physical Exam  Vitals and nursing note reviewed.   Constitutional:       General: He is not in acute distress.     Appearance: He is well-developed.   HENT:      Head: Normocephalic.      Right Ear: External ear normal.      Left Ear: External ear normal.      Nose: Nose normal.   Eyes:       Conjunctiva/sclera: Conjunctivae normal.   Neck:      Thyroid: No thyromegaly.      Trachea: No tracheal deviation.   Cardiovascular:      Rate and Rhythm: Normal rate and regular rhythm.      Heart sounds: Normal heart sounds. No murmur heard.  Pulmonary:      Effort: Pulmonary effort is normal. No respiratory distress.      Breath sounds: Normal breath sounds. No wheezing or rales.   Abdominal:      General: Bowel sounds are normal. There is no distension.      Palpations: Abdomen is soft. There is no splenomegaly.      Tenderness: There is no abdominal tenderness. There is no guarding.   Musculoskeletal:         General: Normal range of motion.      Cervical back: Normal range of motion and neck supple.   Skin:     General: Skin is warm and dry.      Findings: No rash.      Comments: No s/s infection PICC line   Neurological:      Mental Status: He is alert and oriented to person, place, and time.      Coordination: Coordination normal.      Gait: Gait normal.   Psychiatric:         Behavior: Behavior normal.         Thought Content: Thought content normal.         Judgment: Judgment normal.         RECORDS REVIEW:   I have reviewed and interpreted the results in [facility] and [facility]    ASSESSMENT     Diagnoses and all orders for this visit:    1. Acute cystitis without hematuria (Primary)    2. Benign prostatic hyperplasia with urinary retention    3. History of encephalopathy        PLAN    Acute cystitis/BPH with urinary retention/history encephalopathy  -He will continue IV Cefepime tid until completion on 6/28. Plans are for patient to return to assisted living facility when abx complete.     Patient was encouraged to keep me informed of any acute changes, or any new concerning symptoms.     Nursing and therapy to continue supportive care for all ADLs.    [x]  Discussed Patient in detail with nursing/staff, addressed all needs today.     [x]  Plan of Care Reviewed   [x]  PT/OT Reviewed   []  Order Changes  [x]   Discharge Plans Reviewed  [x]  Advance Directive on file with Nursing Home.   [x]  POA on file with Nursing Home.   [x]  Code Status listed: [x]  Full Code   []  DNR     I spent 40 minutes caring for Remington on this date of service. This time includes time spent by me in the following activities:preparing for the visit, reviewing tests, obtaining and/or reviewing a separately obtained history, performing a medically appropriate examination and/or evaluation , counseling and educating the patient/family/caregiver, referring and communicating with other health care professionals , documenting information in the medical record and independently interpreting results and communicating that information with the patient/family/caregiver      Madelyn Jackson, APRN.

## 2022-07-07 ENCOUNTER — NURSING HOME (OUTPATIENT)
Dept: INTERNAL MEDICINE | Facility: CLINIC | Age: 87
End: 2022-07-07

## 2022-07-07 VITALS
BODY MASS INDEX: 27.76 KG/M2 | OXYGEN SATURATION: 96 % | HEART RATE: 70 BPM | WEIGHT: 188 LBS | RESPIRATION RATE: 18 BRPM | DIASTOLIC BLOOD PRESSURE: 71 MMHG | TEMPERATURE: 97.7 F | SYSTOLIC BLOOD PRESSURE: 123 MMHG

## 2022-07-07 DIAGNOSIS — N30.00 ACUTE CYSTITIS WITHOUT HEMATURIA: Primary | ICD-10-CM

## 2022-07-07 DIAGNOSIS — I10 ESSENTIAL HYPERTENSION: ICD-10-CM

## 2022-07-07 DIAGNOSIS — R33.8 BENIGN PROSTATIC HYPERPLASIA WITH URINARY RETENTION: ICD-10-CM

## 2022-07-07 DIAGNOSIS — N40.1 BENIGN PROSTATIC HYPERPLASIA WITH URINARY RETENTION: ICD-10-CM

## 2022-07-07 DIAGNOSIS — E03.9 HYPOTHYROIDISM (ACQUIRED): ICD-10-CM

## 2022-07-07 DIAGNOSIS — Z95.1 HX OF CABG: ICD-10-CM

## 2022-07-07 PROCEDURE — 99308 SBSQ NF CARE LOW MDM 20: CPT | Performed by: INTERNAL MEDICINE

## 2022-07-18 NOTE — PROGRESS NOTES
"  Nursing Home Progress Note        Juvencio Bermudez DO []  DIANE Castellon []  [address] [facility], [place] [address]  Phone: [phone]  Fax: [phone] Kenya Rodriguez MD []  Anil Villavicencio DO [x]   [address] [place] [address]  Phone: [phone]  Fax: [phone]     PATIENT NAME: Remington Rojas                                                                          YOB: 1935           DATE OF SERVICE: 07/07/2022  FACILITY:  [] [place]   [] [place]   [x] [facility]   [] [facility]  []  [facility]  [] Other ______________________________________________________________________     CHIEF COMPLAINT:  Chronic Medical Management      HISTORY OF PRESENT ILLNESS:   Patient was resting comfortably with no new complaints or concerns.  Mood and behaviors have been stable.  He completed IV cefepime and PICC has been removed.  He has been participating with PT and has made good progress.  He remains compliant with taking all of his medications.      PAST MEDICAL & SURGICAL HISTORY:   Past Medical History:   Diagnosis Date   • Acute myocardial infarction (HCC)     \"ABOUT 8-10 YEARS AGO\" PATIENT REPORTS   • BPH (benign prostatic hyperplasia)    • CAD (coronary artery disease)    • Elevated cholesterol     REPORTS HAS BEEN ON MEDICATION IN THE PAST   • GERD without esophagitis    • [place] (hard of hearing)    • Hyperlipidemia     UNSPECIFIED   • Hypertension    • Hypothyroidism    • Impaired functional mobility, balance, gait, and endurance    • Overactive bladder    • Seasonal allergies    • Wears dentures       Past Surgical History:   Procedure Laterality Date   • CARDIAC CATHETERIZATION      REPORTS \"ABOUT 8-10 YEARS AGO\" HAD 2 STENTS PLACED   • CATARACT EXTRACTION W/ INTRAOCULAR LENS IMPLANT Right 6/21/2021    Procedure: CATARACT PHACO EXTRACTION WITH INTRAOCULAR LENS IMPLANT RIGHT COMLEX W/ MALYUGIN RING;  Surgeon: Td Palmer MD;  Location: [facility] OR;  " Service: Ophthalmology;  Laterality: Right;   • CATARACT EXTRACTION W/ INTRAOCULAR LENS IMPLANT Left 7/19/2021    Procedure: CATARACT PHACO EXTRACTION WITH INTRAOCULAR LENS IMPLANT LEFT COMPLEX W/ MALYUGIN RING;  Surgeon: Td Palmer MD;  Location: [facility];  Service: Ophthalmology;  Laterality: Left;   • CORONARY ARTERY BYPASS GRAFT  2006   • HERNIA REPAIR      BILATERAL INGUINAL HERNIA REPAIRS WITH MESH WITHIN 3 YEARS   • MOUTH SURGERY      FULL TEETH EXTRACTION   • ORIF WRIST FRACTURE Right 6/22/2018    Procedure: OPEN REDUCTION INTERNAL FIXATION WITH VOLAR PLATE (SYNTHES), RIGHT WRIST;  Surgeon: Sen Flanagan MD;  Location: [facility];  Service: Orthopedics   • OTHER SURGICAL HISTORY      INGUINAL HERNIA REPAIR BILATERAL   • OTHER SURGICAL HISTORY      PREVIOUS STENT PLACEMENT         MEDICATIONS:  I have reviewed and reconciled the patients medication list in the patients chart at the [facility] nursing [facility] today, 07/07/2022.      ALLERGIES:  Allergies   Allergen Reactions   • Metoprolol Unknown (See Comments)     PATIENT DOES NOT RECALL ALLERGY REACTION OR TYPE, BUT DR BROWN (CARDIOTHORACIC SURGEON) TOLD HIM NOT TO TAKE IT.         SOCIAL HISTORY:  Social History     Socioeconomic History   • Marital status:    Tobacco Use   • Smoking status: Former Smoker   • Smokeless tobacco: Never Used   Substance and Sexual Activity   • Alcohol use: No   • Drug use: No   • Sexual activity: Defer       FAMILY HISTORY:  Family History   Problem Relation Age of Onset   • Coronary artery disease Mother    • Coronary artery disease Sister        REVIEW OF SYSTEMS:  Review of Systems   Constitutional: Negative for chills, fatigue and fever.   HENT: Negative for congestion, ear pain, rhinorrhea, sinus pressure and sore throat.    Eyes: Negative for visual disturbance.   Respiratory: Negative for cough, chest tightness, shortness of breath and wheezing.    Cardiovascular: Negative for chest pain, palpitations  and leg swelling.   Gastrointestinal: Negative for abdominal pain, blood in stool, constipation, diarrhea, nausea and vomiting.   Endocrine: Negative for polydipsia and polyuria.   Genitourinary: Negative for dysuria and hematuria.   Musculoskeletal: Negative for arthralgias and back pain.   Skin: Negative for rash.   Neurological: Negative for dizziness, light-headedness, numbness and headaches.   Psychiatric/Behavioral: Negative for dysphoric mood and sleep disturbance. The patient is not nervous/anxious.           PHYSICAL EXAMINATION:     VITAL SIGNS:  /71   Pulse 70   Temp 97.7 °F (36.5 °C)   Resp 18   Wt 85.3 kg (188 lb)   SpO2 96%   BMI 27.76 kg/m²     Physical Exam  Vitals and nursing note reviewed.   Constitutional:       Appearance: Normal appearance. He is well-developed.   HENT:      Head: Normocephalic and atraumatic.      Ears:      Comments: Hard of hearing     Nose: Nose normal.      Mouth/Throat:      Mouth: Mucous membranes are moist.      Pharynx: No oropharyngeal exudate.   Eyes:      General: No scleral icterus.     Conjunctiva/sclera: Conjunctivae normal.      Pupils: Pupils are equal, round, and reactive to light.   Neck:      Thyroid: No thyromegaly.   Cardiovascular:      Rate and Rhythm: Normal rate and regular rhythm.      Heart sounds: Normal heart sounds. No murmur heard.    No friction rub. No gallop.   Pulmonary:      Effort: Pulmonary effort is normal. No respiratory distress.      Breath sounds: Normal breath sounds. No wheezing.   Abdominal:      General: Bowel sounds are normal. There is no distension.      Palpations: Abdomen is soft.      Tenderness: There is no abdominal tenderness.   Musculoskeletal:         General: No deformity or signs of injury.      Cervical back: Normal range of motion and neck supple.   Lymphadenopathy:      Cervical: No cervical adenopathy.   Skin:     General: Skin is warm and dry.      Findings: No rash.   Neurological:      Mental Status:  He is alert and oriented to person, place, and time.   Psychiatric:         Mood and Affect: Mood normal.         Behavior: Behavior normal.         RECORDS REVIEW:       ASSESSMENT     Diagnoses and all orders for this visit:    1. Acute cystitis without hematuria (Primary)    2. Benign prostatic hyperplasia with urinary retention    3. Hypothyroidism (acquired)    4. Essential hypertension    5. Hx of CABG        PLAN    Debility and Weakness  - plan to complete PT for strengthening and return to [facility].     MDR Pseudomonas UTI  - Completed Cefipime, PICC line out, no signs of recurrence.      Presbycusis   - cont hearing aides     Essential hypertension   - BP remains well controlled on lisinopril     Hypothyroidism  - cont synthroid, TSH is stable.      BPH  - cont Proscar and tamsulosin  - following with Dr. Fuchs, s/p cystoscopy 6/24       [x]  Discussed Patient in detail with nursing/staff, addressed all needs today.     [x]  Plan of Care Reviewed   []  PT/OT Reviewed   []  Order Changes  []  Discharge Plans Reviewed  [x]  Advance Directive on file with Nursing Home.   [x]  POA on file with Nursing Home.    [x]  Code Status listed and reviewed.     I confirm accuracy of unchanged data/findings including physical exam and plan which have been carried forward from previous visit, as well as I have updated appropriately those that have changed        Anil Villavicencio DO.  7/18/2022

## 2022-07-26 ENCOUNTER — NURSING HOME (OUTPATIENT)
Dept: FAMILY MEDICINE CLINIC | Facility: CLINIC | Age: 87
End: 2022-07-26

## 2022-07-26 VITALS
WEIGHT: 178.7 LBS | SYSTOLIC BLOOD PRESSURE: 118 MMHG | DIASTOLIC BLOOD PRESSURE: 72 MMHG | HEART RATE: 83 BPM | TEMPERATURE: 98.2 F | OXYGEN SATURATION: 98 % | BODY MASS INDEX: 26.39 KG/M2 | RESPIRATION RATE: 18 BRPM

## 2022-07-26 DIAGNOSIS — Z78.9 IMPAIRED MOBILITY AND ADLS: ICD-10-CM

## 2022-07-26 DIAGNOSIS — R60.0 BILATERAL LOWER EXTREMITY EDEMA: Primary | ICD-10-CM

## 2022-07-26 DIAGNOSIS — N30.00 ACUTE CYSTITIS WITHOUT HEMATURIA: ICD-10-CM

## 2022-07-26 DIAGNOSIS — Z74.09 IMPAIRED MOBILITY AND ADLS: ICD-10-CM

## 2022-07-26 PROCEDURE — 99309 SBSQ NF CARE MODERATE MDM 30: CPT | Performed by: NURSE PRACTITIONER

## 2022-07-27 PROBLEM — Z01.818 PREOP EXAMINATION: Status: RESOLVED | Noted: 2018-06-19 | Resolved: 2022-07-27

## 2022-07-27 NOTE — PROGRESS NOTES
"Nursing Home Follow Up Note      Juvencio Bermudez DO []   DIANE Castellon [x]  [address] [place] [address]  Phone: [phone]  Fax: [phone] Kenya Rodriguez MD []    Anil Villavicencio DO []   [address] [place] [address]  Phone: [phone]  Fax: [phone]     PATIENT NAME: Remington Rojas                                                                          YOB: 1935           DATE OF SERVICE: 07/26/2022  FACILITY:  [][facility]   [] [place]   [x] [facility]   [] [facility]   [] Other ______________________________________________________________________      CHIEF COMPLAINT:     Increased lower extremity edema.     HISTORY OF PRESENT ILLNESS:     Physical therapy reported to nursing today that increased edema was noted to bilateral lower extremities during session today.  Patient reports that he does have edema in his lower extremities every day because he sits up in his wheelchair with his legs hanging down all day.  He reports that the edema improves when he lays down at night.  He reports there is no pain or discomfort and he has no edema elsewhere.  He denies any shortness of breath.    He will continue antibiotics for UTI till 8/1.      PAST MEDICAL & SURGICAL HISTORY:   Past Medical History:   Diagnosis Date   • Acute myocardial infarction (HCC)     \"ABOUT 8-10 YEARS AGO\" PATIENT REPORTS   • BPH (benign prostatic hyperplasia)    • CAD (coronary artery disease)    • Elevated cholesterol     REPORTS HAS BEEN ON MEDICATION IN THE PAST   • GERD without esophagitis    • [place] (hard of hearing)    • Hyperlipidemia     UNSPECIFIED   • Hypertension    • Hypothyroidism    • Impaired functional mobility, balance, gait, and endurance    • Overactive bladder    • Seasonal allergies    • Wears dentures       Past Surgical History:   Procedure Laterality Date   • CARDIAC CATHETERIZATION      REPORTS \"ABOUT 8-10 YEARS AGO\" HAD 2 STENTS PLACED   • CATARACT EXTRACTION W/ " INTRAOCULAR LENS IMPLANT Right 6/21/2021    Procedure: CATARACT PHACO EXTRACTION WITH INTRAOCULAR LENS IMPLANT RIGHT COMLEX W/ MALYUGIN RING;  Surgeon: Td Palmer MD;  Location: [facility];  Service: Ophthalmology;  Laterality: Right;   • CATARACT EXTRACTION W/ INTRAOCULAR LENS IMPLANT Left 7/19/2021    Procedure: CATARACT PHACO EXTRACTION WITH INTRAOCULAR LENS IMPLANT LEFT COMPLEX W/ MALYUGIN RING;  Surgeon: Td Palmer MD;  Location: [facility];  Service: Ophthalmology;  Laterality: Left;   • CORONARY ARTERY BYPASS GRAFT  2006   • HERNIA REPAIR      BILATERAL INGUINAL HERNIA REPAIRS WITH MESH WITHIN 3 YEARS   • MOUTH SURGERY      FULL TEETH EXTRACTION   • ORIF WRIST FRACTURE Right 6/22/2018    Procedure: OPEN REDUCTION INTERNAL FIXATION WITH VOLAR PLATE (SYNTHES), RIGHT WRIST;  Surgeon: Sen Flanagan MD;  Location: [facility];  Service: Orthopedics   • OTHER SURGICAL HISTORY      INGUINAL HERNIA REPAIR BILATERAL   • OTHER SURGICAL HISTORY      PREVIOUS STENT PLACEMENT         MEDICATIONS:  I have reviewed and reconciled the patients medication list in the patients chart at the [facility] nursing [facility] today.      ALLERGIES:    Allergies   Allergen Reactions   • Metoprolol Unknown (See Comments)     PATIENT DOES NOT RECALL ALLERGY REACTION OR TYPE, BUT DR BROWN (CARDIOTHORACIC SURGEON) TOLD HIM NOT TO TAKE IT.         SOCIAL HISTORY:    Social History     Socioeconomic History   • Marital status:    Tobacco Use   • Smoking status: Former Smoker   • Smokeless tobacco: Never Used   Substance and Sexual Activity   • Alcohol use: No   • Drug use: No   • Sexual activity: Defer       FAMILY HISTORY:    Family History   Problem Relation Age of Onset   • Coronary artery disease Mother    • Coronary artery disease Sister        REVIEW OF SYSTEMS:    Review of Systems   Constitutional: Negative for activity change, appetite change, chills, diaphoresis, fatigue, fever, unexpected weight gain and  unexpected weight loss.   Eyes: Negative for pain, discharge, redness and itching.   Respiratory: Negative for apnea, cough, choking, chest tightness, shortness of breath and wheezing.    Cardiovascular: Positive for leg swelling. Negative for chest pain and palpitations.   Gastrointestinal: Negative for abdominal distention, abdominal pain, blood in stool, constipation, diarrhea, nausea, vomiting, GERD and indigestion.   Endocrine: Negative for polydipsia and polyuria.   Genitourinary: Negative for decreased urine volume, difficulty urinating, dysuria, flank pain, frequency, hematuria, urgency and urinary incontinence.   Musculoskeletal: Negative for back pain, gait problem, joint swelling and myalgias. Arthralgias: chronic.   Skin: Negative for color change, dry skin, rash and skin lesions.   Neurological: Positive for weakness (LE). Negative for dizziness, memory problem and confusion.   Psychiatric/Behavioral: Negative for behavioral problems, dysphoric mood, hallucinations, sleep disturbance and depressed mood. The patient is not nervous/anxious.          PHYSICAL EXAMINATION:   VITAL SIGNS:   Vitals:    07/26/22 1105   BP: 118/72   Pulse: 83   Resp: 18   Temp: 98.2 °F (36.8 °C)   SpO2: 98%   Weight: 81.1 kg (178 lb 11.2 oz)       Physical Exam  Vitals and nursing note reviewed.   Constitutional:       General: He is not in acute distress.     Appearance: He is well-developed.   HENT:      Head: Normocephalic.      Right Ear: External ear normal.      Left Ear: External ear normal.      Nose: Nose normal.   Eyes:      Conjunctiva/sclera: Conjunctivae normal.   Neck:      Thyroid: No thyromegaly.      Trachea: No tracheal deviation.   Cardiovascular:      Rate and Rhythm: Normal rate and regular rhythm.      Heart sounds: Normal heart sounds. No murmur heard.     Comments: Trace edema bilateral ankles  Pulmonary:      Effort: Pulmonary effort is normal. No respiratory distress.      Breath sounds: Normal breath  sounds. No wheezing or rales.   Abdominal:      General: Bowel sounds are normal. There is no distension.      Palpations: Abdomen is soft. There is no splenomegaly.      Tenderness: There is no abdominal tenderness. There is no guarding.   Musculoskeletal:         General: Normal range of motion.      Cervical back: Normal range of motion and neck supple.   Skin:     General: Skin is warm and dry.      Findings: No rash.      Comments: No s/s infection PICC line   Neurological:      Mental Status: He is alert and oriented to person, place, and time.      Coordination: Coordination normal.      Gait: Gait normal.   Psychiatric:         Behavior: Behavior normal.         Thought Content: Thought content normal.         Judgment: Judgment normal.         RECORDS REVIEW:   I have reviewed and interpreted the results in [facility]    ASSESSMENT     Diagnoses and all orders for this visit:    1. Bilateral lower extremity edema (Primary)    2. Acute cystitis without hematuria    3. Impaired mobility and ADLs        PLAN    BLE edema  -NELSON hose to bilateral lower extremities during the day, off at night.  Patient encouraged to elevate legs is much as possible.  Will follow-up and continue to monitor.    UTI  -Oral antibiotics complete 7/30 and IV antibiotics complete on 8/1.  We will continue to monitor.    Patient was encouraged to keep me informed of any acute changes, or any new concerning symptoms.     Nursing and therapy to continue supportive care for all ADLs.    [x]  Discussed Patient in detail with nursing/staff, addressed all needs today.     [x]  Plan of Care Reviewed   [x]  PT/OT Reviewed   []  Order Changes  [x]  Discharge Plans Reviewed  [x]  Advance Directive on file with Nursing Home.   [x]  POA on file with Nursing Home.   [x]  Code Status listed: [x]  Full Code   []  DNR         Madelyn Jackson, DIANE.

## 2024-05-17 ENCOUNTER — HOSPITAL ENCOUNTER (OUTPATIENT)
Facility: HOSPITAL | Age: 89
Setting detail: OBSERVATION
Discharge: HOME | End: 2024-05-17
Attending: STUDENT IN AN ORGANIZED HEALTH CARE EDUCATION/TRAINING PROGRAM | Admitting: INTERNAL MEDICINE
Payer: MEDICARE

## 2024-05-17 ENCOUNTER — APPOINTMENT (OUTPATIENT)
Dept: GENERAL RADIOLOGY | Facility: HOSPITAL | Age: 89
End: 2024-05-17
Payer: MEDICARE

## 2024-05-17 ENCOUNTER — APPOINTMENT (OUTPATIENT)
Dept: CARDIOLOGY | Facility: HOSPITAL | Age: 89
End: 2024-05-17
Payer: MEDICARE

## 2024-05-17 VITALS
SYSTOLIC BLOOD PRESSURE: 117 MMHG | BODY MASS INDEX: 26.48 KG/M2 | TEMPERATURE: 97.7 F | HEIGHT: 69 IN | WEIGHT: 178.79 LBS | DIASTOLIC BLOOD PRESSURE: 65 MMHG | RESPIRATION RATE: 16 BRPM | HEART RATE: 74 BPM | OXYGEN SATURATION: 98 %

## 2024-05-17 DIAGNOSIS — R07.9 ACUTE CHEST PAIN: Primary | ICD-10-CM

## 2024-05-17 LAB
ALBUMIN SERPL-MCNC: 3.7 G/DL (ref 3.5–5.2)
ALBUMIN/GLOB SERPL: 1.1 G/DL
ALP SERPL-CCNC: 98 U/L (ref 39–117)
ALT SERPL W P-5'-P-CCNC: 13 U/L (ref 1–41)
ANION GAP SERPL CALCULATED.3IONS-SCNC: 12.2 MMOL/L (ref 5–15)
ANION GAP SERPL CALCULATED.3IONS-SCNC: 9.5 MMOL/L (ref 5–15)
AST SERPL-CCNC: 16 U/L (ref 1–40)
BASOPHILS # BLD AUTO: 0.09 10*3/MM3 (ref 0–0.2)
BASOPHILS # BLD AUTO: 0.1 10*3/MM3 (ref 0–0.2)
BASOPHILS NFR BLD AUTO: 0.7 % (ref 0–1.5)
BASOPHILS NFR BLD AUTO: 1 % (ref 0–1.5)
BH CV ECHO MEAS - AO MAX PG: 4.2 MMHG
BH CV ECHO MEAS - AO MEAN PG: 2 MMHG
BH CV ECHO MEAS - AO ROOT DIAM: 3.2 CM
BH CV ECHO MEAS - AO V2 MAX: 102 CM/SEC
BH CV ECHO MEAS - AO V2 VTI: 23 CM
BH CV ECHO MEAS - AVA(I,D): 3 CM2
BH CV ECHO MEAS - EDV(CUBED): 143.1 ML
BH CV ECHO MEAS - EDV(MOD-SP2): 68.7 ML
BH CV ECHO MEAS - EDV(MOD-SP4): 89.1 ML
BH CV ECHO MEAS - EF(MOD-BP): 45 %
BH CV ECHO MEAS - EF(MOD-SP2): 33.2 %
BH CV ECHO MEAS - EF(MOD-SP4): 54.4 %
BH CV ECHO MEAS - EF_3D-VOL: 40 %
BH CV ECHO MEAS - ESV(CUBED): 74.6 ML
BH CV ECHO MEAS - ESV(MOD-SP2): 45.9 ML
BH CV ECHO MEAS - ESV(MOD-SP4): 40.6 ML
BH CV ECHO MEAS - FS: 19.5 %
BH CV ECHO MEAS - IVS/LVPW: 1.2 CM
BH CV ECHO MEAS - IVSD: 1 CM
BH CV ECHO MEAS - LA DIMENSION: 5.5 CM
BH CV ECHO MEAS - LAT PEAK E' VEL: 6.3 CM/SEC
BH CV ECHO MEAS - LV DIASTOLIC VOL/BSA (35-75): 45.3 CM2
BH CV ECHO MEAS - LV MASS(C)D: 174.4 GRAMS
BH CV ECHO MEAS - LV MAX PG: 2.5 MMHG
BH CV ECHO MEAS - LV MEAN PG: 1 MMHG
BH CV ECHO MEAS - LV SYSTOLIC VOL/BSA (12-30): 20.7 CM2
BH CV ECHO MEAS - LV V1 MAX: 79.6 CM/SEC
BH CV ECHO MEAS - LV V1 VTI: 20.5 CM
BH CV ECHO MEAS - LVIDD: 5.2 CM
BH CV ECHO MEAS - LVIDS: 4.2 CM
BH CV ECHO MEAS - LVOT AREA: 3.3 CM2
BH CV ECHO MEAS - LVOT DIAM: 2.06 CM
BH CV ECHO MEAS - LVPWD: 0.83 CM
BH CV ECHO MEAS - MED PEAK E' VEL: 5.2 CM/SEC
BH CV ECHO MEAS - MV A MAX VEL: 104 CM/SEC
BH CV ECHO MEAS - MV DEC SLOPE: 217 CM/SEC2
BH CV ECHO MEAS - MV DEC TIME: 0.31 SEC
BH CV ECHO MEAS - MV E MAX VEL: 68.1 CM/SEC
BH CV ECHO MEAS - MV E/A: 0.65
BH CV ECHO MEAS - MV MAX PG: 4.7 MMHG
BH CV ECHO MEAS - MV MEAN PG: 2 MMHG
BH CV ECHO MEAS - MV V2 VTI: 29.8 CM
BH CV ECHO MEAS - MVA(VTI): 2.29 CM2
BH CV ECHO MEAS - PA ACC TIME: 0.15 SEC
BH CV ECHO MEAS - PA V2 MAX: 154 CM/SEC
BH CV ECHO MEAS - RAP SYSTOLE: 3 MMHG
BH CV ECHO MEAS - RV MAX PG: 0.96 MMHG
BH CV ECHO MEAS - RV V1 MAX: 49 CM/SEC
BH CV ECHO MEAS - RV V1 VTI: 10 CM
BH CV ECHO MEAS - RVSP: 27.2 MMHG
BH CV ECHO MEAS - SV(LVOT): 68.3 ML
BH CV ECHO MEAS - SV(MOD-SP2): 22.8 ML
BH CV ECHO MEAS - SV(MOD-SP4): 48.5 ML
BH CV ECHO MEAS - SVI(LVOT): 34.8 ML/M2
BH CV ECHO MEAS - SVI(MOD-SP2): 11.6 ML/M2
BH CV ECHO MEAS - SVI(MOD-SP4): 24.7 ML/M2
BH CV ECHO MEAS - TAPSE (>1.6): 2.41 CM
BH CV ECHO MEAS - TR MAX PG: 24.2 MMHG
BH CV ECHO MEAS - TR MAX VEL: 246 CM/SEC
BH CV ECHO MEASUREMENTS AVERAGE E/E' RATIO: 11.84
BH CV XLRA - RV BASE: 3.4 CM
BH CV XLRA - TDI S': 8.6 CM/SEC
BILIRUB SERPL-MCNC: 0.5 MG/DL (ref 0–1.2)
BUN SERPL-MCNC: 10 MG/DL (ref 8–23)
BUN SERPL-MCNC: 9 MG/DL (ref 8–23)
BUN/CREAT SERPL: 9.2 (ref 7–25)
BUN/CREAT SERPL: 9.8 (ref 7–25)
CALCIUM SPEC-SCNC: 8.3 MG/DL (ref 8.6–10.5)
CALCIUM SPEC-SCNC: 8.4 MG/DL (ref 8.6–10.5)
CHLORIDE SERPL-SCNC: 102 MMOL/L (ref 98–107)
CHLORIDE SERPL-SCNC: 106 MMOL/L (ref 98–107)
CHOLEST SERPL-MCNC: 153 MG/DL (ref 0–200)
CO2 SERPL-SCNC: 22.5 MMOL/L (ref 22–29)
CO2 SERPL-SCNC: 22.8 MMOL/L (ref 22–29)
CREAT SERPL-MCNC: 0.92 MG/DL (ref 0.76–1.27)
CREAT SERPL-MCNC: 1.09 MG/DL (ref 0.76–1.27)
DEPRECATED RDW RBC AUTO: 40.5 FL (ref 37–54)
DEPRECATED RDW RBC AUTO: 41.1 FL (ref 37–54)
EGFRCR SERPLBLD CKD-EPI 2021: 66.1 ML/MIN/1.73
EGFRCR SERPLBLD CKD-EPI 2021: 81 ML/MIN/1.73
EOSINOPHIL # BLD AUTO: 0.16 10*3/MM3 (ref 0–0.4)
EOSINOPHIL # BLD AUTO: 0.33 10*3/MM3 (ref 0–0.4)
EOSINOPHIL NFR BLD AUTO: 1.6 % (ref 0.3–6.2)
EOSINOPHIL NFR BLD AUTO: 2.7 % (ref 0.3–6.2)
ERYTHROCYTE [DISTWIDTH] IN BLOOD BY AUTOMATED COUNT: 11.7 % (ref 12.3–15.4)
ERYTHROCYTE [DISTWIDTH] IN BLOOD BY AUTOMATED COUNT: 11.8 % (ref 12.3–15.4)
GEN 5 2HR TROPONIN T REFLEX: 19 NG/L
GLOBULIN UR ELPH-MCNC: 3.3 GM/DL
GLUCOSE SERPL-MCNC: 107 MG/DL (ref 65–99)
GLUCOSE SERPL-MCNC: 121 MG/DL (ref 65–99)
HBA1C MFR BLD: 5.3 % (ref 4.8–5.6)
HCT VFR BLD AUTO: 43.2 % (ref 37.5–51)
HCT VFR BLD AUTO: 45.1 % (ref 37.5–51)
HDLC SERPL-MCNC: 35 MG/DL (ref 40–60)
HGB BLD-MCNC: 14.2 G/DL (ref 13–17.7)
HGB BLD-MCNC: 15.1 G/DL (ref 13–17.7)
HOLD SPECIMEN: NORMAL
HOLD SPECIMEN: NORMAL
IMM GRANULOCYTES # BLD AUTO: 0.12 10*3/MM3 (ref 0–0.05)
IMM GRANULOCYTES # BLD AUTO: 0.14 10*3/MM3 (ref 0–0.05)
IMM GRANULOCYTES NFR BLD AUTO: 1.2 % (ref 0–0.5)
IMM GRANULOCYTES NFR BLD AUTO: 1.2 % (ref 0–0.5)
LDLC SERPL CALC-MCNC: 101 MG/DL (ref 0–100)
LDLC/HDLC SERPL: 2.86 {RATIO}
LYMPHOCYTES # BLD AUTO: 3.06 10*3/MM3 (ref 0.7–3.1)
LYMPHOCYTES # BLD AUTO: 4.44 10*3/MM3 (ref 0.7–3.1)
LYMPHOCYTES NFR BLD AUTO: 30.5 % (ref 19.6–45.3)
LYMPHOCYTES NFR BLD AUTO: 36.9 % (ref 19.6–45.3)
MCH RBC QN AUTO: 31.3 PG (ref 26.6–33)
MCH RBC QN AUTO: 32 PG (ref 26.6–33)
MCHC RBC AUTO-ENTMCNC: 32.9 G/DL (ref 31.5–35.7)
MCHC RBC AUTO-ENTMCNC: 33.5 G/DL (ref 31.5–35.7)
MCV RBC AUTO: 95.4 FL (ref 79–97)
MCV RBC AUTO: 95.6 FL (ref 79–97)
MONOCYTES # BLD AUTO: 0.84 10*3/MM3 (ref 0.1–0.9)
MONOCYTES # BLD AUTO: 0.98 10*3/MM3 (ref 0.1–0.9)
MONOCYTES NFR BLD AUTO: 8.1 % (ref 5–12)
MONOCYTES NFR BLD AUTO: 8.4 % (ref 5–12)
NEUTROPHILS NFR BLD AUTO: 5.76 10*3/MM3 (ref 1.7–7)
NEUTROPHILS NFR BLD AUTO: 50.4 % (ref 42.7–76)
NEUTROPHILS NFR BLD AUTO: 57.3 % (ref 42.7–76)
NEUTROPHILS NFR BLD AUTO: 6.05 10*3/MM3 (ref 1.7–7)
NRBC BLD AUTO-RTO: 0 /100 WBC (ref 0–0.2)
NRBC BLD AUTO-RTO: 0 /100 WBC (ref 0–0.2)
NT-PROBNP SERPL-MCNC: 310.1 PG/ML (ref 0–1800)
PLATELET # BLD AUTO: 186 10*3/MM3 (ref 140–450)
PLATELET # BLD AUTO: 191 10*3/MM3 (ref 140–450)
PMV BLD AUTO: 9.6 FL (ref 6–12)
PMV BLD AUTO: 9.8 FL (ref 6–12)
POTASSIUM SERPL-SCNC: 3.7 MMOL/L (ref 3.5–5.2)
POTASSIUM SERPL-SCNC: 3.7 MMOL/L (ref 3.5–5.2)
PROT SERPL-MCNC: 7 G/DL (ref 6–8.5)
RBC # BLD AUTO: 4.53 10*6/MM3 (ref 4.14–5.8)
RBC # BLD AUTO: 4.72 10*6/MM3 (ref 4.14–5.8)
SODIUM SERPL-SCNC: 137 MMOL/L (ref 136–145)
SODIUM SERPL-SCNC: 138 MMOL/L (ref 136–145)
TRIGL SERPL-MCNC: 89 MG/DL (ref 0–150)
TROPONIN T DELTA: 10 NG/L
TROPONIN T SERPL HS-MCNC: 9 NG/L
VLDLC SERPL-MCNC: 17 MG/DL (ref 5–40)
WBC NRBC COR # BLD AUTO: 10.04 10*3/MM3 (ref 3.4–10.8)
WBC NRBC COR # BLD AUTO: 12.03 10*3/MM3 (ref 3.4–10.8)
WHOLE BLOOD HOLD COAG: NORMAL
WHOLE BLOOD HOLD SPECIMEN: NORMAL

## 2024-05-17 PROCEDURE — 71045 X-RAY EXAM CHEST 1 VIEW: CPT

## 2024-05-17 PROCEDURE — 99204 OFFICE O/P NEW MOD 45 MIN: CPT | Performed by: INTERNAL MEDICINE

## 2024-05-17 PROCEDURE — G0378 HOSPITAL OBSERVATION PER HR: HCPCS

## 2024-05-17 PROCEDURE — 83880 ASSAY OF NATRIURETIC PEPTIDE: CPT | Performed by: STUDENT IN AN ORGANIZED HEALTH CARE EDUCATION/TRAINING PROGRAM

## 2024-05-17 PROCEDURE — 93005 ELECTROCARDIOGRAM TRACING: CPT | Performed by: STUDENT IN AN ORGANIZED HEALTH CARE EDUCATION/TRAINING PROGRAM

## 2024-05-17 PROCEDURE — 36415 COLL VENOUS BLD VENIPUNCTURE: CPT

## 2024-05-17 PROCEDURE — 25010000002 ENOXAPARIN PER 10 MG: Performed by: INTERNAL MEDICINE

## 2024-05-17 PROCEDURE — 93306 TTE W/DOPPLER COMPLETE: CPT | Performed by: INTERNAL MEDICINE

## 2024-05-17 PROCEDURE — 85025 COMPLETE CBC W/AUTO DIFF WBC: CPT | Performed by: INTERNAL MEDICINE

## 2024-05-17 PROCEDURE — 99285 EMERGENCY DEPT VISIT HI MDM: CPT

## 2024-05-17 PROCEDURE — 99222 1ST HOSP IP/OBS MODERATE 55: CPT | Performed by: INTERNAL MEDICINE

## 2024-05-17 PROCEDURE — 96372 THER/PROPH/DIAG INJ SC/IM: CPT

## 2024-05-17 PROCEDURE — 80061 LIPID PANEL: CPT | Performed by: INTERNAL MEDICINE

## 2024-05-17 PROCEDURE — 93306 TTE W/DOPPLER COMPLETE: CPT

## 2024-05-17 PROCEDURE — 83036 HEMOGLOBIN GLYCOSYLATED A1C: CPT | Performed by: INTERNAL MEDICINE

## 2024-05-17 PROCEDURE — 84484 ASSAY OF TROPONIN QUANT: CPT | Performed by: STUDENT IN AN ORGANIZED HEALTH CARE EDUCATION/TRAINING PROGRAM

## 2024-05-17 PROCEDURE — 99235 HOSP IP/OBS SAME DATE MOD 70: CPT | Performed by: INTERNAL MEDICINE

## 2024-05-17 PROCEDURE — 80053 COMPREHEN METABOLIC PANEL: CPT | Performed by: STUDENT IN AN ORGANIZED HEALTH CARE EDUCATION/TRAINING PROGRAM

## 2024-05-17 PROCEDURE — 85025 COMPLETE CBC W/AUTO DIFF WBC: CPT | Performed by: STUDENT IN AN ORGANIZED HEALTH CARE EDUCATION/TRAINING PROGRAM

## 2024-05-17 RX ORDER — ATORVASTATIN CALCIUM 80 MG/1
80 TABLET, FILM COATED ORAL NIGHTLY
Status: DISCONTINUED | OUTPATIENT
Start: 2024-05-17 | End: 2024-05-17 | Stop reason: HOSPADM

## 2024-05-17 RX ORDER — SODIUM CHLORIDE 0.9 % (FLUSH) 0.9 %
10 SYRINGE (ML) INJECTION AS NEEDED
Status: DISCONTINUED | OUTPATIENT
Start: 2024-05-17 | End: 2024-05-17 | Stop reason: HOSPADM

## 2024-05-17 RX ORDER — ACETAMINOPHEN 160 MG/5ML
650 SOLUTION ORAL EVERY 4 HOURS PRN
Status: DISCONTINUED | OUTPATIENT
Start: 2024-05-17 | End: 2024-05-17 | Stop reason: HOSPADM

## 2024-05-17 RX ORDER — LEVOTHYROXINE SODIUM 0.1 MG/1
100 TABLET ORAL DAILY
Status: DISCONTINUED | OUTPATIENT
Start: 2024-05-17 | End: 2024-05-17 | Stop reason: HOSPADM

## 2024-05-17 RX ORDER — NITROGLYCERIN 0.4 MG/1
0.4 TABLET SUBLINGUAL
Status: DISCONTINUED | OUTPATIENT
Start: 2024-05-17 | End: 2024-05-17

## 2024-05-17 RX ORDER — ENOXAPARIN SODIUM 100 MG/ML
1 INJECTION SUBCUTANEOUS EVERY 12 HOURS
Status: DISCONTINUED | OUTPATIENT
Start: 2024-05-17 | End: 2024-05-17 | Stop reason: HOSPADM

## 2024-05-17 RX ORDER — CHOLECALCIFEROL (VITAMIN D3) 125 MCG
5 CAPSULE ORAL NIGHTLY PRN
Status: DISCONTINUED | OUTPATIENT
Start: 2024-05-17 | End: 2024-05-17 | Stop reason: HOSPADM

## 2024-05-17 RX ORDER — LISINOPRIL 5 MG/1
2.5 TABLET ORAL DAILY
Status: DISCONTINUED | OUTPATIENT
Start: 2024-05-17 | End: 2024-05-17 | Stop reason: HOSPADM

## 2024-05-17 RX ORDER — SODIUM CHLORIDE 0.9 % (FLUSH) 0.9 %
10 SYRINGE (ML) INJECTION EVERY 12 HOURS SCHEDULED
Status: DISCONTINUED | OUTPATIENT
Start: 2024-05-17 | End: 2024-05-17 | Stop reason: HOSPADM

## 2024-05-17 RX ORDER — ISOSORBIDE MONONITRATE 30 MG/1
30 TABLET, EXTENDED RELEASE ORAL
Status: DISCONTINUED | OUTPATIENT
Start: 2024-05-17 | End: 2024-05-17 | Stop reason: HOSPADM

## 2024-05-17 RX ORDER — ISOSORBIDE MONONITRATE 30 MG/1
30 TABLET, EXTENDED RELEASE ORAL DAILY
Qty: 30 TABLET | Refills: 0 | Status: SHIPPED | OUTPATIENT
Start: 2024-05-17

## 2024-05-17 RX ORDER — NALOXONE HCL 0.4 MG/ML
0.4 VIAL (ML) INJECTION
Status: DISCONTINUED | OUTPATIENT
Start: 2024-05-17 | End: 2024-05-17 | Stop reason: HOSPADM

## 2024-05-17 RX ORDER — BISACODYL 5 MG/1
5 TABLET, DELAYED RELEASE ORAL DAILY PRN
Status: DISCONTINUED | OUTPATIENT
Start: 2024-05-17 | End: 2024-05-17 | Stop reason: HOSPADM

## 2024-05-17 RX ORDER — ACETAMINOPHEN 650 MG/1
650 SUPPOSITORY RECTAL EVERY 4 HOURS PRN
Status: DISCONTINUED | OUTPATIENT
Start: 2024-05-17 | End: 2024-05-17 | Stop reason: HOSPADM

## 2024-05-17 RX ORDER — ASPIRIN 81 MG/1
81 TABLET ORAL DAILY
Status: DISCONTINUED | OUTPATIENT
Start: 2024-05-17 | End: 2024-05-17 | Stop reason: HOSPADM

## 2024-05-17 RX ORDER — SODIUM CHLORIDE 9 MG/ML
40 INJECTION, SOLUTION INTRAVENOUS AS NEEDED
Status: DISCONTINUED | OUTPATIENT
Start: 2024-05-17 | End: 2024-05-17 | Stop reason: HOSPADM

## 2024-05-17 RX ORDER — POLYETHYLENE GLYCOL 3350 17 G/17G
17 POWDER, FOR SOLUTION ORAL DAILY PRN
Status: DISCONTINUED | OUTPATIENT
Start: 2024-05-17 | End: 2024-05-17 | Stop reason: HOSPADM

## 2024-05-17 RX ORDER — AMOXICILLIN 250 MG
2 CAPSULE ORAL 2 TIMES DAILY PRN
Status: DISCONTINUED | OUTPATIENT
Start: 2024-05-17 | End: 2024-05-17 | Stop reason: HOSPADM

## 2024-05-17 RX ORDER — NITROGLYCERIN 0.4 MG/1
0.4 TABLET SUBLINGUAL
Status: DISCONTINUED | OUTPATIENT
Start: 2024-05-17 | End: 2024-05-17 | Stop reason: HOSPADM

## 2024-05-17 RX ORDER — ONDANSETRON 2 MG/ML
4 INJECTION INTRAMUSCULAR; INTRAVENOUS EVERY 6 HOURS PRN
Status: DISCONTINUED | OUTPATIENT
Start: 2024-05-17 | End: 2024-05-17 | Stop reason: HOSPADM

## 2024-05-17 RX ORDER — BISACODYL 10 MG
10 SUPPOSITORY, RECTAL RECTAL DAILY PRN
Status: DISCONTINUED | OUTPATIENT
Start: 2024-05-17 | End: 2024-05-17 | Stop reason: HOSPADM

## 2024-05-17 RX ORDER — AMLODIPINE BESYLATE 2.5 MG/1
2.5 TABLET ORAL DAILY
Qty: 30 TABLET | Refills: 0 | Status: SHIPPED | OUTPATIENT
Start: 2024-05-17

## 2024-05-17 RX ORDER — MORPHINE SULFATE 2 MG/ML
1 INJECTION, SOLUTION INTRAMUSCULAR; INTRAVENOUS EVERY 4 HOURS PRN
Status: DISCONTINUED | OUTPATIENT
Start: 2024-05-17 | End: 2024-05-17 | Stop reason: HOSPADM

## 2024-05-17 RX ORDER — ACETAMINOPHEN 325 MG/1
650 TABLET ORAL EVERY 4 HOURS PRN
Status: DISCONTINUED | OUTPATIENT
Start: 2024-05-17 | End: 2024-05-17 | Stop reason: HOSPADM

## 2024-05-17 RX ORDER — AMLODIPINE BESYLATE 5 MG/1
2.5 TABLET ORAL
Status: DISCONTINUED | OUTPATIENT
Start: 2024-05-17 | End: 2024-05-17 | Stop reason: HOSPADM

## 2024-05-17 RX ADMIN — Medication 10 ML: at 09:36

## 2024-05-17 RX ADMIN — ISOSORBIDE MONONITRATE 30 MG: 30 TABLET, EXTENDED RELEASE ORAL at 11:49

## 2024-05-17 RX ADMIN — ASPIRIN 81 MG: 81 TABLET, COATED ORAL at 09:17

## 2024-05-17 RX ADMIN — AMLODIPINE BESYLATE 2.5 MG: 5 TABLET ORAL at 11:49

## 2024-05-17 RX ADMIN — ENOXAPARIN SODIUM 80 MG: 100 INJECTION SUBCUTANEOUS at 05:57

## 2024-05-17 RX ADMIN — LEVOTHYROXINE SODIUM 100 MCG: 100 TABLET ORAL at 09:17

## 2024-05-17 RX ADMIN — LISINOPRIL 2.5 MG: 5 TABLET ORAL at 09:17

## 2024-05-17 NOTE — PAYOR COMM NOTE
"TO:HUMANA  FROM:ELLIS SPENCER RN PHONE [phone]  OBSERVATION NOTIFICATION  TAX ID [id number] NPI [id number]    Amanda Saravia (88 y.o. Male)       Date of Birth   1935    Social Security Number       Address   [address] [place] [id number]    Home Phone   [phone]    MRN   [id number]       Zoroastrianism   None    Marital Status                               Admission Date   24    Admission Type   Emergency    Admitting Provider   Pablo Holt DO    Attending Provider   Shin Smith MD    Department, Room/Bed   [facility] TELEMETRY 3, 319/1       Discharge Date       Discharge Disposition       Discharge Destination                                 Attending Provider: Shin Smith MD    Allergies: Metoprolol    Isolation: Contact   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)   Code Status: CPR    Ht: 175.3 cm (69.02\")   Wt: 81.1 kg (178 lb 12.7 oz)    Admission Cmt: None   Principal Problem: Acute chest pain [R07.9]                   Active Insurance as of 2024       Primary Coverage       Payor Plan Insurance Group Employer/Plan Group    HUMANA MEDICARE REPLACEMENT HUMANA MED ADV HMO [id number]       Payor Plan Address Payor Plan Phone Number Payor Plan Fax Number Effective Dates    [address] [address] [phone]  2023 - None Entered    Formerly [facility] [id number]         Subscriber Name Subscriber Birth Date Member ID       AMANDA SARAVIA 1935 [id number]                     Emergency Contacts        (Rel.) Home Phone Work Phone Mobile Phone    Reagan Saravia (Son) [phone] -- --    Anya Saravia (Sister) [phone] -- --                 History & Physical        Pablo Holt DO at 24 0523            [facility] HOSPITALIST   HISTORY AND PHYSICAL      Name:  Amanda Saravia   Age:  88 y.o.  Sex:  male  :  1935  MRN:  [id number]   Visit Number:  [id number]  Admission Date:  " 5/17/2024  Date Of Service:  05/17/24  Primary Care Physician:  Karoline Aceves MD    Chief Complaint:     Chest pain    History Of Presenting Illness:      Patient is a chronically ill 88-year-old male with history of CAD status post CABG who presents to the emergency room after acute onset midsternal chest pain that awoke patient from sleep.  Patient reported that pain did radiate into his left arm and was associated with some nausea, no episode of vomiting.  Patient received 324 mg oral aspirin prior to arrival.  Currently chest pain-free.  Upon arrival to the ER patient afebrile, hemodynamically stable and nonhypoxic on room air.  Labs are significant for uptrending troponin 9-19 with a delta of +10.  Normal proBNP.  Unremarkable CMP except for glucose 121.  Unremarkable CBC.  Chest x-ray personally examined, cardiomegaly, sternotomy wires, no acute process.  Given concerning chest pain symptoms with high risk history, hospitalist asked to admit.    Review Of Systems:    All systems were reviewed and negative except as mentioned in history of presenting illness, assessment and plan.    Past Medical History: Patient  has a past medical history of Acute myocardial infarction, BPH (benign prostatic hyperplasia), CAD (coronary artery disease), Elevated cholesterol, GERD without esophagitis, [place] (hard of hearing), Hyperlipidemia, Hypertension, Hypothyroidism, Impaired functional mobility, balance, gait, and endurance, Overactive bladder, Seasonal allergies, and Wears dentures.    Past Surgical History: Patient  has a past surgical history that includes Coronary artery bypass graft (2006); Other surgical history; Other surgical history; Cardiac catheterization; Hernia repair; Mouth surgery; ORIF wrist fracture (Right, 6/22/2018); Cataract extraction w/ intraocular lens implant (Right, 6/21/2021); and Cataract extraction w/ intraocular lens implant (Left, 7/19/2021).    Social History: Patient  reports that  "he has quit smoking. He has never used smokeless tobacco. He reports that he does not drink alcohol and does not use drugs.    Family History:  Patient's family history has been reviewed and found to be noncontributory.     Allergies:      Metoprolol    Home Medications:    Prior to Admission Medications       Prescriptions Last Dose Informant Patient Reported? Taking?    aspirin (aspirin) 81 MG EC tablet   No No    Take 1 tablet by mouth Daily.    finasteride (PROSCAR) 5 MG tablet  Self Yes No    Take 5 mg by mouth daily.    levothyroxine (SYNTHROID, LEVOTHROID) 88 MCG tablet   Yes No    Take 100 mcg by mouth Daily. Patient taking 100 mcg qd    lisinopril (PRINIVIL,ZESTRIL) 2.5 MG tablet  Self Yes No    Take 2.5 mg by mouth Daily.    nitroglycerin (NITROSTAT) 0.4 MG SL tablet  Self Yes No    Place 0.4 mg under the tongue Every 5 (Five) Minutes As Needed.    Patient not taking:  Reported on 6/16/2022    tamsulosin (FLOMAX) 0.4 MG capsule 24 hr capsule   Yes No    Take 1 capsule by mouth 2 (Two) Times a Day.          ED Medications:    Medications   sodium chloride 0.9 % flush 10 mL (has no administration in time range)     Vital Signs:  Temp:  [97.7 °F (36.5 °C)] 97.7 °F (36.5 °C)  Heart Rate:  [67-90] 67  Resp:  [17] 17  BP: (140-156)/(75-88) 154/75        05/17/24  0133   Weight: 81.1 kg (178 lb 12.7 oz)     Body mass index is 26.4 kg/m².    Physical Exam:     Most recent vital Signs: /75   Pulse 67   Temp 97.7 °F (36.5 °C) (Oral)   Resp 17   Ht 175.3 cm (69\")   Wt 81.1 kg (178 lb 12.7 oz)   SpO2 94%   BMI 26.40 kg/m²     Physical Exam  Vitals reviewed.   Constitutional:       General: He is not in acute distress.  HENT:      Head: Normocephalic and atraumatic.      Right Ear: External ear normal.      Left Ear: External ear normal.      Mouth/Throat:      Mouth: Mucous membranes are moist.      Pharynx: Oropharynx is clear.   Eyes:      Extraocular Movements: Extraocular movements intact.      " "Conjunctiva/sclera: Conjunctivae normal.   Cardiovascular:      Rate and Rhythm: Normal rate and regular rhythm.      Pulses: Normal pulses.      Heart sounds: Normal heart sounds.   Pulmonary:      Effort: Pulmonary effort is normal.      Breath sounds: Normal breath sounds.   Abdominal:      General: Bowel sounds are normal.      Palpations: Abdomen is soft.   Musculoskeletal:      Right lower leg: No edema.      Left lower leg: No edema.   Skin:     General: Skin is warm and dry.   Neurological:      General: No focal deficit present.      Mental Status: He is alert and oriented to person, place, and time.   Psychiatric:         Behavior: Behavior normal.         Laboratory data:    I have reviewed the labs done in the emergency room.    Results from last 7 days   Lab Units 05/17/24  0129   SODIUM mmol/L 137   POTASSIUM mmol/L 3.7   CHLORIDE mmol/L 102   CO2 mmol/L 22.8   BUN mg/dL 10   CREATININE mg/dL 1.09   CALCIUM mg/dL 8.4*   BILIRUBIN mg/dL 0.5   ALK PHOS U/L 98   ALT (SGPT) U/L 13   AST (SGOT) U/L 16   GLUCOSE mg/dL 121*     Results from last 7 days   Lab Units 05/17/24  0129   WBC 10*3/mm3 12.03*   HEMOGLOBIN g/dL 15.1   HEMATOCRIT % 45.1   PLATELETS 10*3/mm3 191         Results from last 7 days   Lab Units 05/17/24  0339 05/17/24  0129   HSTROP T ng/L 19 9     Results from last 7 days   Lab Units 05/17/24  0129   PROBNP pg/mL 310.1                       Invalid input(s): \"USDES\", \"NITRITITE\", \"BACT\", \"EP\"    Pain Management Panel          Latest Ref Rng & Units 6/21/2015   Pain Management Panel   Amphetamine, Urine Qual NEGATIVE ng/mL Negative    Barbiturates Screen, Urine NEGATIVE ng/mL Negative    Benzodiazepine Screen, Urine NEGATIVE ng/mL Negative    Cocaine Screen, Urine NEGATIVE ng/mL Negative    Methadone Screen , Urine NEGATIVE ng/mL Negative        EKG:      EKG personally reviewed, sinus rhythm with rate of 93 bpm.  Occasional PVC.  No acute ST or T wave changes.    Radiology:    No radiology " "results for the last 3 days    Assessment:    Unstable angina POA  CAD status post CABG  Hypertension  Hyperlipidemia  Hypothyroidism  BPH  Impaired mobility and ADL    Plan:    Unstable angina  -Troponins trended upward with significant delta  -Currently chest pain-free  -Lipid panel and A1c pending  -2D echo  -Therapeutic Lovenox, high intensity statin, aspirin  -Cardiology consulted and appreciate recommendation    Further orders as clinical course dictate    Risk Assessment: High  DVT Prophylaxis: Therapeutic Lovenox  Code Status: Full  Diet: N.p.o.            Pablo Holt DO  24  05:23 EDT    Dictated utilizing Dragon dictation.      Electronically signed by Pablo Holt DO at 24 0651          Emergency Department Notes        Camilo West MD at 24 0128           EMERGENCY DEPARTMENT ENCOUNTER    Pt Name: Remington Rojas  MRN: [id number]  Pt :   1935  Room Number:    Date of encounter:  2024  PCP: Karoline Aceves MD  ED Provider: Camilo West MD    Historian: Patient and EMS      HPI:  Chief Complaint: Chest pain        Context: Remington Rojas is a 88 y.o. male who presents to the ED c/o chest pain.  Patient states that he woke up from sleep with acute left-sided chest pain that radiated into his left arm.  EMS was called and provided patient with 324 mg oral aspirin.  Patient states that chest pain fully resolved upon coming into the emergency department and denies any current symptoms.  Denies any recent illness.  Denies any cough or difficulty breathing.  Patient does report previous myocardial infarction around 10 years ago, on 81 mg aspirin daily.      PAST MEDICAL HISTORY  Past Medical History:   Diagnosis Date    Acute myocardial infarction     \"ABOUT 8-10 YEARS AGO\" PATIENT REPORTS    BPH (benign prostatic hyperplasia)     CAD (coronary artery disease)     Elevated cholesterol     REPORTS HAS BEEN ON MEDICATION IN THE PAST    GERD without " "esophagitis     [place] (hard of hearing)     Hyperlipidemia     UNSPECIFIED    Hypertension     Hypothyroidism     Impaired functional mobility, balance, gait, and endurance     Overactive bladder     Seasonal allergies     Wears dentures          PAST SURGICAL HISTORY  Past Surgical History:   Procedure Laterality Date    CARDIAC CATHETERIZATION      REPORTS \"ABOUT 8-10 YEARS AGO\" HAD 2 STENTS PLACED    CATARACT EXTRACTION W/ INTRAOCULAR LENS IMPLANT Right 6/21/2021    Procedure: CATARACT PHACO EXTRACTION WITH INTRAOCULAR LENS IMPLANT RIGHT COMLEX W/ MALYUGIN RING;  Surgeon: Td Palmer MD;  Location: [facility] OR;  Service: Ophthalmology;  Laterality: Right;    CATARACT EXTRACTION W/ INTRAOCULAR LENS IMPLANT Left 7/19/2021    Procedure: CATARACT PHACO EXTRACTION WITH INTRAOCULAR LENS IMPLANT LEFT COMPLEX W/ MALYUGIN RING;  Surgeon: Td Palmer MD;  Location: [facility] OR;  Service: Ophthalmology;  Laterality: Left;    CORONARY ARTERY BYPASS GRAFT  2006    HERNIA REPAIR      BILATERAL INGUINAL HERNIA REPAIRS WITH MESH WITHIN 3 YEARS    MOUTH SURGERY      FULL TEETH EXTRACTION    ORIF WRIST FRACTURE Right 6/22/2018    Procedure: OPEN REDUCTION INTERNAL FIXATION WITH VOLAR PLATE (SYNTHES), RIGHT WRIST;  Surgeon: Sen Flanagan MD;  Location: [facility] OR;  Service: Orthopedics    OTHER SURGICAL HISTORY      INGUINAL HERNIA REPAIR BILATERAL    OTHER SURGICAL HISTORY      PREVIOUS STENT PLACEMENT         FAMILY HISTORY  Family History   Problem Relation Age of Onset    Coronary artery disease Mother     Coronary artery disease Sister          SOCIAL HISTORY  Social History     Socioeconomic History    Marital status:    Tobacco Use    Smoking status: Former    Smokeless tobacco: Never   Substance and Sexual Activity    Alcohol use: No    Drug use: No    Sexual activity: Defer         ALLERGIES  Metoprolol        REVIEW OF SYSTEMS  Review of Systems     All systems reviewed and negative except for those " discussed in HPI.       PHYSICAL EXAM    I have reviewed the triage vital signs and nursing notes.    ED Triage Vitals   Temp Heart Rate Resp BP SpO2   05/17/24 0123 05/17/24 0123 05/17/24 0123 05/17/24 0124 05/17/24 0124   97.7 °F (36.5 °C) 72 17 156/88 98 %      Temp src Heart Rate Source Patient Position BP Location FiO2 (%)   05/17/24 0123 -- 05/17/24 0123 05/17/24 0123 --   Oral  Sitting Left arm        Physical Exam    General:  Awake, alert, elderly, no acute distress  HEENT: Atraumatic, normocephalic, EOMI, PERRLA, mucous membranes moist  NECK:  Supple, atraumatic  Cardiovascular:  Regular rate, regular rhythm, no murmurs, rubs, or gallops.  Extremities well perfused   Respiratory:  Regular rate, clear lungs to auscultation bilaterally.  No rhonchi, rales, wheezing  Abdominal:  Soft, nondistended, nontender.  No guarding or rebound.  No palpable masses  Extremity:  No visible bony abnormalities in all 4 extremities.  Full range of motion of all extremities.  Skin:  Warm and dry.  No rashes  Neuro:  AAOx3, GCS 15. Cranial nerves 2-12 grossly intact.  No focal strength or sensation deficits.  Psych:  Mood and affect appropriate.        LAB RESULTS  Recent Results (from the past 24 hour(s))   Comprehensive Metabolic Panel    Collection Time: 05/17/24  1:29 AM    Specimen: Blood   Result Value Ref Range    Glucose 121 (H) 65 - 99 mg/dL    BUN 10 8 - 23 mg/dL    Creatinine 1.09 0.76 - 1.27 mg/dL    Sodium 137 136 - 145 mmol/L    Potassium 3.7 3.5 - 5.2 mmol/L    Chloride 102 98 - 107 mmol/L    CO2 22.8 22.0 - 29.0 mmol/L    Calcium 8.4 (L) 8.6 - 10.5 mg/dL    Total Protein 7.0 6.0 - 8.5 g/dL    Albumin 3.7 3.5 - 5.2 g/dL    ALT (SGPT) 13 1 - 41 U/L    AST (SGOT) 16 1 - 40 U/L    Alkaline Phosphatase 98 39 - 117 U/L    Total Bilirubin 0.5 0.0 - 1.2 mg/dL    Globulin 3.3 gm/dL    A/G Ratio 1.1 g/dL    BUN/Creatinine Ratio 9.2 7.0 - 25.0    Anion Gap 12.2 5.0 - 15.0 mmol/L    eGFR 66.1 >60.0 mL/min/1.73   High  Sensitivity Troponin T    Collection Time: 05/17/24  1:29 AM    Specimen: Blood   Result Value Ref Range    HS Troponin T 9 <22 ng/L   Green Top (Gel)    Collection Time: 05/17/24  1:29 AM   Result Value Ref Range    Extra Tube Hold for add-ons.    Lavender Top    Collection Time: 05/17/24  1:29 AM   Result Value Ref Range    Extra Tube hold for add-on    Gold Top - SST    Collection Time: 05/17/24  1:29 AM   Result Value Ref Range    Extra Tube Hold for add-ons.    Light Blue Top    Collection Time: 05/17/24  1:29 AM   Result Value Ref Range    Extra Tube Hold for add-ons.    CBC Auto Differential    Collection Time: 05/17/24  1:29 AM    Specimen: Blood   Result Value Ref Range    WBC 12.03 (H) 3.40 - 10.80 10*3/mm3    RBC 4.72 4.14 - 5.80 10*6/mm3    Hemoglobin 15.1 13.0 - 17.7 g/dL    Hematocrit 45.1 37.5 - 51.0 %    MCV 95.6 79.0 - 97.0 fL    MCH 32.0 26.6 - 33.0 pg    MCHC 33.5 31.5 - 35.7 g/dL    RDW 11.8 (L) 12.3 - 15.4 %    RDW-SD 41.1 37.0 - 54.0 fl    MPV 9.6 6.0 - 12.0 fL    Platelets 191 140 - 450 10*3/mm3    Neutrophil % 50.4 42.7 - 76.0 %    Lymphocyte % 36.9 19.6 - 45.3 %    Monocyte % 8.1 5.0 - 12.0 %    Eosinophil % 2.7 0.3 - 6.2 %    Basophil % 0.7 0.0 - 1.5 %    Immature Grans % 1.2 (H) 0.0 - 0.5 %    Neutrophils, Absolute 6.05 1.70 - 7.00 10*3/mm3    Lymphocytes, Absolute 4.44 (H) 0.70 - 3.10 10*3/mm3    Monocytes, Absolute 0.98 (H) 0.10 - 0.90 10*3/mm3    Eosinophils, Absolute 0.33 0.00 - 0.40 10*3/mm3    Basophils, Absolute 0.09 0.00 - 0.20 10*3/mm3    Immature Grans, Absolute 0.14 (H) 0.00 - 0.05 10*3/mm3    nRBC 0.0 0.0 - 0.2 /100 WBC   BNP    Collection Time: 05/17/24  1:29 AM    Specimen: Blood   Result Value Ref Range    proBNP 310.1 0.0 - 1,800.0 pg/mL   High Sensitivity Troponin T 2Hr    Collection Time: 05/17/24  3:39 AM    Specimen: Blood   Result Value Ref Range    HS Troponin T 19 <22 ng/L    Troponin T Delta 10 (C) >=-4 - <+4 ng/L       If labs were ordered, I independently reviewed  the results and considered them in treating the patient.        RADIOLOGY  No Radiology Exams Resulted Within Past 24 Hours    I ordered and independently reviewed the above noted radiographic studies.     See radiologist's dictation for official interpretation.        PROCEDURES    Procedures    ECG 12 Lead ED Triage Standing Order; Chest Pain   Final Result          MEDICATIONS GIVEN IN ER    Medications   sodium chloride 0.9 % flush 10 mL (has no administration in time range)         MEDICAL DECISION MAKING, PROGRESS, and CONSULTS    All labs, if obtained, have been independently reviewed by me.  All radiology studies, if obtained, have been reviewed by me and the radiologist dictating the report.  All EKG's, if obtained, have been independently viewed and interpreted by me.      Discussion below represents my analysis of pertinent findings related to patient's condition, differential diagnosis, treatment plan and final disposition.    Remington Rojas is a 88 y.o. male who presents to the ED c/o chest pain.  Mildly hypertensive on arrival with blood pressure 156/88 but mentating appropriately and nontoxic-appearing.  GCS 15.  Brought in by EMS.  Differential diagnosis includes but is not limited to MI, musculoskeletal chest pain, GERD, stable angina, unstable angina, pneumothorax, rib fracture. Labs obtained including CBC, CMP, Troponin profile. Chest xray and EKG obtained. Patient received total of 324mg ASA prior to arrival.  Asymptomatic on arrival.    EKG independently interpretted by myself and shows sinus rhythm with rate of 93 and occasional PVC but no evidence of acute ischemic changes or significant interval abnormalities otherwise.    Chest x-ray personally interpreted showing cardiomegaly but no evidence of pulmonary edema or acute cardiopulmonary abnormality.    Normal initial troponin.  Mild leukocytosis of 12.  No critical electrolyte imbalances.    2-hour troponin of 19.  Positive delta change.   Due to patient's elevated heart score along with delta change of troponin and high risk chest pain, patient to be admitted by hospitalist for further cardiac observation.  Patient agreeable to this plan.      HEART Score: 5                      Orders placed during this visit:  Orders Placed This Encounter   Procedures    XR Chest 1 View    [place] Draw    Comprehensive Metabolic Panel    High Sensitivity Troponin T    CBC Auto Differential    BNP    High Sensitivity Troponin T 2Hr    NPO Diet NPO Type: Strict NPO    Undress & Gown    Continuous Pulse Oximetry    Oxygen Therapy- Nasal Cannula; Titrate 1-6 LPM Per SpO2; 90 - 95%    ECG 12 Lead ED Triage Standing Order; Chest Pain    Insert Peripheral IV    Initiate Observation Status    CBC & Differential    Green Top (Gel)    Lavender Top    Gold Top - SST    Light Blue Top         ED Course:    Consultants:                  Shared Decision Making:  After my consideration of clinical presentation and any laboratory/radiology studies obtained, I discussed the findings with the patient/patient representative who is in agreement with the treatment plan and the final disposition.   Risks and benefits of discharge and/or observation/admission were discussed.      AS OF 04:52 EDT VITALS:    BP - 154/75  HR - 67  TEMP - 97.7 °F (36.5 °C) (Oral)  O2 SATS - 94%                  DIAGNOSIS  Final diagnoses:   Acute chest pain         DISPOSITION  Admit      Please note that portions of this document were completed with voice recognition software.        Camilo West MD  05/17/24 0453      Electronically signed by Camilo West MD at 05/17/24 0453       Vital Signs (last day)       Date/Time Temp Temp src Pulse Resp BP Patient Position SpO2    05/17/24 1129 -- -- 65 -- 145/78 -- --    05/17/24 1100 -- -- 65 -- 145/74 -- 98    05/17/24 1029 -- -- 64 -- 141/73 -- 96    05/17/24 1000 -- -- 67 -- 140/83 -- 96    05/17/24 0917 -- -- 76 -- 140/76 -- --    05/17/24 0855 -- --  -- -- 134/78 -- --    05/17/24 0830 -- -- 70 -- 134/78 -- 94    05/17/24 0800 -- -- 72 -- 143/78 -- 94    05/17/24 0659 -- -- 66 -- 138/77 -- 95    05/17/24 0602 -- -- 70 -- 134/64 -- 96    05/17/24 0532 -- -- 63 -- 126/65 -- 95    05/17/24 0431 -- -- 67 -- 154/75 -- 94    05/17/24 0400 -- -- 67 -- 140/75 -- 96    05/17/24 0127 -- -- 90 -- 143/75 -- 97    05/17/24 0124 -- -- -- -- 156/88 -- 98    05/17/24 0123 97.7 (36.5) Oral 72 17 -- Sitting --          Current Facility-Administered Medications   Medication Dose Route Frequency Provider Last Rate Last Admin    acetaminophen (TYLENOL) tablet 650 mg  650 mg Oral Q4H PRN Pablo Holt DO        Or    acetaminophen (TYLENOL) 160 MG/5ML oral solution 650 mg  650 mg Oral Q4H PRN Pablo Holt DO        Or    acetaminophen (TYLENOL) suppository 650 mg  650 mg Rectal Q4H PRN Pablo Holt DO        amLODIPine (NORVASC) tablet 2.5 mg  2.5 mg Oral Q24H Varun Lee MD   2.5 mg at 05/17/24 1149    aspirin EC tablet 81 mg  81 mg Oral Daily Pablo Holt DO   81 mg at 05/17/24 0917    atorvastatin (LIPITOR) tablet 80 mg  80 mg Oral Nightly Pablo Holt DO        sennosides-docusate (PERICOLACE) 8.6-50 MG per tablet 2 tablet  2 tablet Oral BID PRN Pablo Holt DO        And    polyethylene glycol (MIRALAX) packet 17 g  17 g Oral Daily PRN Pablo Holt DO        And    bisacodyl (DULCOLAX) EC tablet 5 mg  5 mg Oral Daily PRN Pablo Holt DO        And    bisacodyl (DULCOLAX) suppository 10 mg  10 mg Rectal Daily PRN Pablo Holt DO        Enoxaparin Sodium (LOVENOX) syringe 80 mg  1 mg/kg Subcutaneous Q12H Salvador Mckee MD   80 mg at 05/17/24 0557    isosorbide mononitrate (IMDUR) 24 hr tablet 30 mg  30 mg Oral Q24H Varun Lee MD   30 mg at 05/17/24 1149    levothyroxine (SYNTHROID, LEVOTHROID) tablet 100 mcg  100 mcg Oral Daily Pablo Holt DO   100 mcg at 05/17/24 0917    lisinopril (PRINIVIL,ZESTRIL) tablet 2.5 mg  2.5 mg  Oral Daily Pablo Holt DO   2.5 mg at 05/17/24 0917    melatonin tablet 5 mg  5 mg Oral Nightly PRN Pablo Holt DO        Morphine sulfate (PF) injection 1 mg  1 mg Intravenous Q4H PRN Pablo Holt DO        And    naloxone (NARCAN) injection 0.4 mg  0.4 mg Intravenous Q5 Min PRN Pablo Holt DO        nitroglycerin (NITROSTAT) SL tablet 0.4 mg  0.4 mg Sublingual Q5 Min PRN Varun Lee MD        ondansetron (ZOFRAN) injection 4 mg  4 mg Intravenous Q6H PRN Pablo Holt DO        Pharmacy to Dose enoxaparin (LOVENOX)   Does not apply Continuous PRN Pablo Holt DO        sodium chloride 0.9 % flush 10 mL  10 mL Intravenous PRN Camilo West MD        sodium chloride 0.9 % flush 10 mL  10 mL Intravenous Q12H Pablo Holt DO   10 mL at 05/17/24 0936    sodium chloride 0.9 % flush 10 mL  10 mL Intravenous PRN Pablo Holt DO        sodium chloride 0.9 % infusion 40 mL  40 mL Intravenous PRN Pablo Holt DO         Lab Results (last 24 hours)       Procedure Component Value Units Date/Time    Basic Metabolic Panel [[id number]]  (Abnormal) Collected: 05/17/24 0632    Specimen: Blood Updated: 05/17/24 0703     Glucose 107 mg/dL      BUN 9 mg/dL      Creatinine 0.92 mg/dL      Sodium 138 mmol/L      Potassium 3.7 mmol/L      Chloride 106 mmol/L      CO2 22.5 mmol/L      Calcium 8.3 mg/dL      BUN/Creatinine Ratio 9.8     Anion Gap 9.5 mmol/L      eGFR 81.0 mL/min/1.73     Narrative:      GFR Normal >60  Chronic Kidney Disease <60  Kidney Failure <15    The GFR formula is only valid for adults with stable renal function between ages 18 and 70.    Lipid Panel [[id number]]  (Abnormal) Collected: 05/17/24 0632    Specimen: Blood Updated: 05/17/24 0703     Total Cholesterol 153 mg/dL      Triglycerides 89 mg/dL      HDL Cholesterol 35 mg/dL      LDL Cholesterol  101 mg/dL      VLDL Cholesterol 17 mg/dL      LDL/HDL Ratio 2.86    Narrative:      Cholesterol Reference Ranges  (U.S.  Department of Health and Human Services ATP III Classifications)    Desirable          <200 mg/dL  Borderline High    200-239 mg/dL  High Risk          >240 mg/dL      Triglyceride Reference Ranges  (U.S. Department of Health and Human Services ATP III Classifications)    Normal           <150 mg/dL  Borderline High  150-199 mg/dL  High             200-499 mg/dL  Very High        >500 mg/dL    HDL Reference Ranges  (U.S. Department of Health and Human Services ATP III Classifications)    Low     <40 mg/dl (major risk factor for CHD)  High    >60 mg/dl ('negative' risk factor for CHD)        LDL Reference Ranges  (U.S. Department of Health and Human Services ATP III Classifications)    Optimal          <100 mg/dL  Near Optimal     100-129 mg/dL  Borderline High  130-159 mg/dL  High             160-189 mg/dL  Very High        >189 mg/dL    Hemoglobin A1c [[id number]]  (Normal) Collected: 05/17/24 0632    Specimen: Blood Updated: 05/17/24 0657     Hemoglobin A1C 5.30 %     Narrative:      Hemoglobin A1C Ranges:    Increased Risk for Diabetes  5.7% to 6.4%  Diabetes                     >= 6.5%  Diabetic Goal                < 7.0%    CBC Auto Differential [[id number]]  (Abnormal) Collected: 05/17/24 0632    Specimen: Blood Updated: 05/17/24 0644     WBC 10.04 10*3/mm3      RBC 4.53 10*6/mm3      Hemoglobin 14.2 g/dL      Hematocrit 43.2 %      MCV 95.4 fL      MCH 31.3 pg      MCHC 32.9 g/dL      RDW 11.7 %      RDW-SD 40.5 fl      MPV 9.8 fL      Platelets 186 10*3/mm3      Neutrophil % 57.3 %      Lymphocyte % 30.5 %      Monocyte % 8.4 %      Eosinophil % 1.6 %      Basophil % 1.0 %      Immature Grans % 1.2 %      Neutrophils, Absolute 5.76 10*3/mm3      Lymphocytes, Absolute 3.06 10*3/mm3      Monocytes, Absolute 0.84 10*3/mm3      Eosinophils, Absolute 0.16 10*3/mm3      Basophils, Absolute 0.10 10*3/mm3      Immature Grans, Absolute 0.12 10*3/mm3      nRBC 0.0 /100 WBC     High Sensitivity Troponin T 2Hr [[id number]]   (Abnormal) Collected: 05/17/24 0339    Specimen: Blood Updated: 05/17/24 0405     HS Troponin T 19 ng/L      Troponin T Delta 10 ng/L     Narrative:      High Sensitive Troponin T Reference Range:  <14.0 ng/L- Negative Female for AMI  <22.0 ng/L- Negative Male for AMI  >=14 - Abnormal Female indicating possible myocardial injury.  >=22 - Abnormal Male indicating possible myocardial injury.   Clinicians would have to utilize clinical acumen, EKG, Troponin, and serial changes to determine if it is an Acute Myocardial Infarction or myocardial injury due to an underlying chronic condition.         High Sensitivity Troponin T [[id number]]  (Normal) Collected: 05/17/24 0129    Specimen: Blood Updated: 05/17/24 0155     HS Troponin T 9 ng/L     Narrative:      High Sensitive Troponin T Reference Range:  <14.0 ng/L- Negative Female for AMI  <22.0 ng/L- Negative Male for AMI  >=14 - Abnormal Female indicating possible myocardial injury.  >=22 - Abnormal Male indicating possible myocardial injury.   Clinicians would have to utilize clinical acumen, EKG, Troponin, and serial changes to determine if it is an Acute Myocardial Infarction or myocardial injury due to an underlying chronic condition.         BNP [[id number]]  (Normal) Collected: 05/17/24 0129    Specimen: Blood Updated: 05/17/24 0155     proBNP 310.1 pg/mL     Narrative:      This assay is used as an aid in the diagnosis of individuals suspected of having heart failure. It can be used as an aid in the diagnosis of acute decompensated heart failure (ADHF) in patients presenting with signs and symptoms of ADHF to the emergency department (ED). In addition, NT-proBNP of <300 pg/mL indicates ADHF is not likely.    Age Range Result Interpretation  NT-proBNP Concentration (pg/mL:      <50             Positive            >450                   Gray                 300-450                    Negative             <300    50-75           Positive            >900                   Delatorre                300-900                  Negative            <300      >75             Positive            >1800                  Gray                300-1800                  Negative            <300    Comprehensive Metabolic Panel [[id number]]  (Abnormal) Collected: 05/17/24 0129    Specimen: Blood Updated: 05/17/24 0151     Glucose 121 mg/dL      BUN 10 mg/dL      Creatinine 1.09 mg/dL      Sodium 137 mmol/L      Potassium 3.7 mmol/L      Chloride 102 mmol/L      CO2 22.8 mmol/L      Calcium 8.4 mg/dL      Total Protein 7.0 g/dL      Albumin 3.7 g/dL      ALT (SGPT) 13 U/L      AST (SGOT) 16 U/L      Alkaline Phosphatase 98 U/L      Total Bilirubin 0.5 mg/dL      Globulin 3.3 gm/dL      A/G Ratio 1.1 g/dL      BUN/Creatinine Ratio 9.2     Anion Gap 12.2 mmol/L      eGFR 66.1 mL/min/1.73     Narrative:      GFR Normal >60  Chronic Kidney Disease <60  Kidney Failure <15    The GFR formula is only valid for adults with stable renal function between ages 18 and 70.    [place] Draw [[id number]] Collected: 05/17/24 0129    Specimen: Blood Updated: 05/17/24 0145    Narrative:      The following orders were created for panel order [place] Draw.  Procedure                               Abnormality         Status                     ---------                               -----------         ------                     Green Top (Gel)[[id number]]                                  Final result               Lavender Top[[id number]]                                     Final result               Gold Top - SST[[id number]]                                   Final result               Light Blue Top[[id number]]                                   Final result                 Please view results for these tests on the individual orders.    Green Top (Gel) [[id number]] Collected: 05/17/24 0129    Specimen: Blood Updated: 05/17/24 0145     Extra Tube Hold for add-ons.     Comment: Auto resulted.       Lavender Top [[id number]]  Collected: 05/17/24 0129    Specimen: Blood Updated: 05/17/24 0145     Extra Tube hold for add-on     Comment: Auto resulted       Gold Top - SST [[id number]] Collected: 05/17/24 0129    Specimen: Blood Updated: 05/17/24 0145     Extra Tube Hold for add-ons.     Comment: Auto resulted.       Light Blue Top [[id number]] Collected: 05/17/24 0129    Specimen: Blood Updated: 05/17/24 0145     Extra Tube Hold for add-ons.     Comment: Auto resulted       CBC & Differential [[id number]]  (Abnormal) Collected: 05/17/24 0129    Specimen: Blood Updated: 05/17/24 0135    Narrative:      The following orders were created for panel order CBC & Differential.  Procedure                               Abnormality         Status                     ---------                               -----------         ------                     CBC Auto Differential[[id number]]        Abnormal            Final result                 Please view results for these tests on the individual orders.    CBC Auto Differential [[id number]]  (Abnormal) Collected: 05/17/24 0129    Specimen: Blood Updated: 05/17/24 0135     WBC 12.03 10*3/mm3      RBC 4.72 10*6/mm3      Hemoglobin 15.1 g/dL      Hematocrit 45.1 %      MCV 95.6 fL      MCH 32.0 pg      MCHC 33.5 g/dL      RDW 11.8 %      RDW-SD 41.1 fl      MPV 9.6 fL      Platelets 191 10*3/mm3      Neutrophil % 50.4 %      Lymphocyte % 36.9 %      Monocyte % 8.1 %      Eosinophil % 2.7 %      Basophil % 0.7 %      Immature Grans % 1.2 %      Neutrophils, Absolute 6.05 10*3/mm3      Lymphocytes, Absolute 4.44 10*3/mm3      Monocytes, Absolute 0.98 10*3/mm3      Eosinophils, Absolute 0.33 10*3/mm3      Basophils, Absolute 0.09 10*3/mm3      Immature Grans, Absolute 0.14 10*3/mm3      nRBC 0.0 /100 WBC           Imaging Results (Last 24 Hours)       Procedure Component Value Units Date/Time    XR Chest 1 View [[id number]] Collected: 05/17/24 0817     Updated: 05/17/24 1036    Narrative:      PROCEDURE: XR CHEST 1  VW-     HISTORY: Chest Pain Triage Protocol     COMPARISON: 06/15/2022     FINDINGS: No acute pulmonary opacity is present. There is no evidence of  effusion or pneumothorax.        There is evidence of prior median sternotomy, presumably from CABG.   Otherwise, mediastinum is unremarkable.        Heart size is mildly enlarged but stable.       Impression:      Unremarkable chest, status post CABG.              This report was signed and finalized on 5/17/2024 10:34 AM by Clifford Bhatti MD.             ECG/EMG Results (last 24 hours)       Procedure Component Value Units Date/Time    ECG 12 Lead ED Triage Standing Order; Chest Pain [[id number]] Resulted: 05/17/24 0136     Updated: 05/17/24 0138    Adult Transthoracic Echo Complete w/ Color, Spectral and Contrast if necessary per protocol [[id number]] Resulted: 05/17/24 1010     Updated: 05/17/24 1023     EF(MOD-bp) 45.0 %      EF_3D-VOL 40.0 %      LVIDd 5.2 cm      LVIDs 4.2 cm      IVSd 1.00 cm      LVPWd 0.83 cm      FS 19.5 %      IVS/LVPW 1.20 cm      ESV(cubed) 74.6 ml      LV Sys Vol (BSA corrected) 20.7 cm2      EDV(cubed) 143.1 ml      LV Figueroa Vol (BSA corrected) 45.3 cm2      LV mass(C)d 174.4 grams      LVOT area 3.3 cm2      LVOT diam 2.06 cm      EDV(MOD-sp2) 68.7 ml      EDV(MOD-sp4) 89.1 ml      ESV(MOD-sp2) 45.9 ml      ESV(MOD-sp4) 40.6 ml      SV(MOD-sp2) 22.8 ml      SV(MOD-sp4) 48.5 ml      SVi(MOD-SP2) 11.6 ml/m2      SVi(MOD-SP4) 24.7 ml/m2      SVi (LVOT) 34.8 ml/m2      EF(MOD-sp2) 33.2 %      EF(MOD-sp4) 54.4 %      MV E max ctahi 68.1 cm/sec      MV A max cathi 104.0 cm/sec      MV dec time 0.31 sec      MV E/A 0.65     Med Peak E' Cathi 5.2 cm/sec      Lat Peak E' Cathi 6.3 cm/sec      TR max cathi 246.0 cm/sec      Avg E/e' ratio 11.84     SV(LVOT) 68.3 ml      RV Base 3.4 cm      TAPSE (>1.6) 2.41 cm      RV S' 8.6 cm/sec      LA dimension (2D)  5.5 cm      LV V1 max 79.6 cm/sec      LV V1 max PG 2.5 mmHg      LV V1 mean PG 1.00 mmHg      LV V1 VTI  20.5 cm      Ao pk cathi 102.0 cm/sec      Ao max PG 4.2 mmHg      Ao mean PG 2.00 mmHg      Ao V2 VTI 23.0 cm      JANET(I,D) 3.0 cm2      MV max PG 4.7 mmHg      MV mean PG 2.00 mmHg      MV V2 VTI 29.8 cm      MVA(VTI) 2.29 cm2      MV dec slope 217.0 cm/sec2      TR max PG 24.2 mmHg      RVSP(TR) 27.2 mmHg      RAP systole 3.0 mmHg      RV V1 max PG 0.96 mmHg      RV V1 max 49.0 cm/sec      RV V1 VTI 10.0 cm      PA V2 max 154.0 cm/sec      PA acc time 0.15 sec      Ao root diam 3.2 cm     Narrative:        Left ventricular systolic function is low normal. Left ventricular   ejection fraction appears to be 51 - 55%. Left ventricular diastolic   function was normal.    The right ventricular cavity is borderline dilated with normal systolic   function.    Mild mitral valve regurgitation is present.    Mild tricuspid valve regurgitation is present. Estimated right   ventricular systolic pressure from tricuspid regurgitation is normal (<35   mmHg).            Physician Progress Notes (last 24 hours)  Notes from 05/16/24 1228 through 05/17/24 1228   No notes of this type exist for this encounter.          Consult Notes (last 24 hours)        Varun Lee MD at 05/17/24 1109        Consult Orders    1. Inpatient Cardiology Consult [[id number]] ordered by Pablo Holt DO at 05/17/24 0523                 [facility]-Cardiology Consult Note    Referring Provider: Yanet  Reason for Consultation: Chest pain    Patient Care Team:  Karoline Aceves MD as PCP - General  Anil Villavicencio DO as PCP - Internal Medicine ([facility] Care Facility)  Madelyn Jackson APRN as PCP - Family Medicine (Long Term [facility])    Chief complaint : Chest pain    Subjective:    History of present illness: This is an 88-year-old male patient with known coronary artery disease who is brought to the emergency room from his long-term care facility after he was awakened from sleep with chest discomfort around midnight 2 days  ago.  The patient indicates he has had no recurrent chest pain while in the hospital here.  His twelve-lead electrocardiogram showed sinus rhythm with no ischemic ST-T wave changes or injury current.  Cardiac troponin was normal x 2 sets.  A bedside echocardiogram showed a normal ejection fraction with no regional wall motion abnormalities.  Left ventricular diastolic function was normal.  There was mild mitral regurgitation and mild tricuspid regurgitation but no significant valvular heart disease and no evidence of pulmonary hypertension.  The patient has prior coronary artery bypass surgery.  He is a patient of Dr. Parks.  He indicates he saw Dr. Parks in the cardiology clinic approximately 1 year ago.    Review of Systems   Review of Systems   Constitutional: Negative for chills, diaphoresis, fever, malaise/fatigue, weight gain and weight loss.   HENT:  Negative for ear discharge, hearing loss, hoarse voice and nosebleeds.    Eyes:  Negative for discharge, double vision, pain and photophobia.   Cardiovascular:  Positive for chest pain. Negative for claudication, cyanosis, dyspnea on exertion, irregular heartbeat, leg swelling, near-syncope, orthopnea, palpitations, paroxysmal nocturnal dyspnea and syncope.   Respiratory:  Negative for cough, hemoptysis, sputum production and wheezing.    Endocrine: Negative for cold intolerance, heat intolerance, polydipsia, polyphagia and polyuria.   Hematologic/Lymphatic: Negative for adenopathy and bleeding problem. Does not bruise/bleed easily.   Skin:  Negative for color change, flushing, itching and rash.   Musculoskeletal:  Negative for muscle cramps, muscle weakness, myalgias and stiffness.   Gastrointestinal:  Negative for abdominal pain, diarrhea, hematemesis, hematochezia, nausea and vomiting.   Genitourinary:  Negative for dysuria, frequency and nocturia.   Neurological:  Negative for focal weakness, loss of balance, numbness, paresthesias and seizures.  "  Psychiatric/Behavioral:  Negative for altered mental status, hallucinations and suicidal ideas.    Allergic/Immunologic: Negative for HIV exposure, hives and persistent infections.       History  Past Medical History:   Diagnosis Date    Acute myocardial infarction     \"ABOUT 8-10 YEARS AGO\" PATIENT REPORTS    BPH (benign prostatic hyperplasia)     CAD (coronary artery disease)     Elevated cholesterol     REPORTS HAS BEEN ON MEDICATION IN THE PAST    GERD without esophagitis     [place] (hard of hearing)     Hyperlipidemia     UNSPECIFIED    Hypertension     Hypothyroidism     Impaired functional mobility, balance, gait, and endurance     Overactive bladder     Seasonal allergies     Wears dentures    ,   Past Surgical History:   Procedure Laterality Date    CARDIAC CATHETERIZATION      REPORTS \"ABOUT 8-10 YEARS AGO\" HAD 2 STENTS PLACED    CATARACT EXTRACTION W/ INTRAOCULAR LENS IMPLANT Right 6/21/2021    Procedure: CATARACT PHACO EXTRACTION WITH INTRAOCULAR LENS IMPLANT RIGHT COMLEX W/ MALYUGIN RING;  Surgeon: Td Palmer MD;  Location: [facility];  Service: Ophthalmology;  Laterality: Right;    CATARACT EXTRACTION W/ INTRAOCULAR LENS IMPLANT Left 7/19/2021    Procedure: CATARACT PHACO EXTRACTION WITH INTRAOCULAR LENS IMPLANT LEFT COMPLEX W/ MALYUGIN RING;  Surgeon: Td Palmer MD;  Location: [facility];  Service: Ophthalmology;  Laterality: Left;    CORONARY ARTERY BYPASS GRAFT  2006    HERNIA REPAIR      BILATERAL INGUINAL HERNIA REPAIRS WITH MESH WITHIN 3 YEARS    MOUTH SURGERY      FULL TEETH EXTRACTION    ORIF WRIST FRACTURE Right 6/22/2018    Procedure: OPEN REDUCTION INTERNAL FIXATION WITH VOLAR PLATE (SYNTHES), RIGHT WRIST;  Surgeon: Sen Flanagan MD;  Location: [facility];  Service: Orthopedics    OTHER SURGICAL HISTORY      INGUINAL HERNIA REPAIR BILATERAL    OTHER SURGICAL HISTORY      PREVIOUS STENT PLACEMENT   ,   Family History   Problem Relation Age of Onset    Coronary artery disease " "Mother     Coronary artery disease Sister    ,   Social History     Tobacco Use    Smoking status: Former    Smokeless tobacco: Never   Vaping Use    Vaping status: Never Used   Substance Use Topics    Alcohol use: No    Drug use: No   , (Not in a hospital admission)   and Allergies:  Metoprolol    Objective:    Vital Sign Min/Max for last 24 hours  Temp  Min: 97.7 °F (36.5 °C)  Max: 97.7 °F (36.5 °C)   BP  Min: 126/65  Max: 156/88   Pulse  Min: 63  Max: 90   Resp  Min: 17  Max: 17   SpO2  Min: 94 %  Max: 98 %   No data recorded   Weight  Min: 81.1 kg (178 lb 12.7 oz)  Max: 81.1 kg (178 lb 12.7 oz)     Flowsheet Rows      Flowsheet Row First Filed Value   Admission Height 175.3 cm (69\") Documented at 05/17/2024 0133   Admission Weight 81.1 kg (178 lb 12.7 oz) Documented at 05/17/2024 0133                 Physical Exam:   Vitals and nursing note reviewed.   Constitutional:       Appearance: Healthy appearance. Not in distress.   Neck:      Vascular: No JVR. JVD normal.   Pulmonary:      Effort: Pulmonary effort is normal.      Breath sounds: Normal breath sounds. No wheezing. No rhonchi. No rales.   Chest:      Chest wall: Not tender to palpatation.   Cardiovascular:      PMI at left midclavicular line. Normal rate. Regular rhythm. Normal S1. Normal S2.       Murmurs: There is no murmur.      No gallop.  No click. No rub.   Pulses:     Intact distal pulses.   Edema:     Peripheral edema absent.   Abdominal:      General: Bowel sounds are normal.      Palpations: Abdomen is soft.      Tenderness: There is no abdominal tenderness.   Musculoskeletal: Normal range of motion.         General: No tenderness. Skin:     General: Skin is warm and dry.   Neurological:      General: No focal deficit present.      Mental Status: Alert and oriented to person, place and time.         Results Review:   I reviewed the patient's new clinical results.  Results from last 7 days   Lab Units 05/17/24  0632 05/17/24  0129   WBC 10*3/mm3 " 10.04 12.03*   HEMOGLOBIN g/dL 14.2 15.1   HEMATOCRIT % 43.2 45.1   PLATELETS 10*3/mm3 186 191     Results from last 7 days   Lab Units 05/17/24  0632 05/17/24  0129   SODIUM mmol/L 138 137   POTASSIUM mmol/L 3.7 3.7   CHLORIDE mmol/L 106 102   CO2 mmol/L 22.5 22.8   BUN mg/dL 9 10   CREATININE mg/dL 0.92 1.09   GLUCOSE mg/dL 107* 121*   CALCIUM mg/dL 8.3* 8.4*     Lab Results   Lab Value Date/Time    TROPONINT 19 05/17/2024 0339    TROPONINT 9 05/17/2024 0129    TROPONINT <0.010 06/15/2022 2335    TROPONINT <0.010 11/25/2019 1907     Results from last 7 days   Lab Units 05/17/24  0632   CHOLESTEROL mg/dL 153   TRIGLYCERIDES mg/dL 89   HDL CHOL mg/dL 35*   LDL CHOL mg/dL 101*         Assessment/Plan:      Acute chest pain      The patient may be discharged to home from my perspective.  Arrangements should be made for the patient to follow-up with his regular established cardiologist Dr. Godfrey Parks.  Outpatient stress testing can be arranged at that clinic visit if deemed appropriate.  The patient is not a candidate for invasive management strategies.  I have recommended starting Imdur 30 mg orally every morning.  I have recommended sublingual nitroglycerin to use on an as-needed basis.  I have recommended additional antianginal therapy with amlodipine 2.5 mg once per day for improved blood pressure control.  I would tend to avoid beta-blockade since his resting heart rate is 60 bpm.    I discussed the patient's findings and my recommendations with patient    Varun ENAMORADO. MD [place]  05/17/24  11:09 EDT            Electronically signed by Varun Lee MD at 05/17/24 4087

## 2024-05-17 NOTE — CASE MANAGEMENT/SOCIAL WORK
Continued Stay Note   Sonny     Patient Name: Remington Rojas  MRN: [id number]  Today's Date: 5/17/2024    Admit Date: 5/17/2024    Plan: [facility] called and stated if patient is not discharged back to them today; then it will be monday before they can reassess and admit back to their assisted living   Discharge Plan       Row Name 05/17/24 1327       Plan    Plan [facility] called and stated if patient is not discharged back to them today; then it will be monday before they can reassess and admit back to their assisted living; informed dr. Smith and rn; stated he will discharge today  13:37 EDT  Called and informed [facility], stated to send d/c summary with any med changes; no report needed to be called; came by ems  14:05 EDT  Called son and informed leaving back to [facility] today; stated he had just left [facility] and they stated they did have a bus that could transport if he left soon enough and if not would like EMS, even if he has to pay, due to unable to get in and out of his automobile safely; informed rn  15:46 EDT  Per rn patient going by [facility] bus      Row Name 05/17/24 1304       Plan    Plan The patient is awake and able to answer questions.  He is a current resident at [facility] where he is followed by Karoline Aceves and gets his medications from [facility].  He elects to enroll in Meds to Bed.  He uses a walker and wheelchair; he denies the need for additional DME at [facility].  At the time of [facility] the patient plans to return to [facility]. Questions and concerns were addressed, GUTIÉRREZ delivered at the time of this conversation.  Will provide additional resource and information upon request.    Patient/Family in Agreement with Plan unable to assess    Provided Post Acute Provider List? N/A    N/A Provider List Comment Patient plans to return to same [facility]; no new DME needs    Provided Post Acute Provider Quality & Resource List? N/A    N/A Quality & Resource  List Comment Patient plans to return to same longterm; no new DME needs    Plan Comments Patient denies needs at this time    Final Discharge Disposition Code 01 - home or self-care    Final Note Patient plans to return to [facility]                   Discharge Codes    No documentation.                       Davida Orr RN

## 2024-05-17 NOTE — H&P
[facility]      Name:  Remington Rojas   Age:  88 y.o.  Sex:  male  :  1935  MRN:  [id number]   Visit Number:  [id number]  Admission Date:  2024  Date Of Service:  24  Primary Care Physician:  Karoline Aceves MD    Chief Complaint:     Chest pain    History Of Presenting Illness:      Patient is a chronically ill 88-year-old male with history of CAD status post CABG who presents to the emergency room after acute onset midsternal chest pain that awoke patient from sleep.  Patient reported that pain did radiate into his left arm and was associated with some nausea, no episode of vomiting.  Patient received 324 mg oral aspirin prior to arrival.  Currently chest pain-free.  Upon arrival to the ER patient afebrile, hemodynamically stable and nonhypoxic on room air.  Labs are significant for uptrending troponin 9-19 with a delta of +10.  Normal proBNP.  Unremarkable CMP except for glucose 121.  Unremarkable CBC.  Chest x-ray personally examined, cardiomegaly, sternotomy wires, no acute process.  Given concerning chest pain symptoms with high risk history, hospitalist asked to admit.    Review Of Systems:    All systems were reviewed and negative except as mentioned in history of presenting illness, assessment and plan.    Past Medical History: Patient  has a past medical history of Acute myocardial infarction, BPH (benign prostatic hyperplasia), CAD (coronary artery disease), Elevated cholesterol, GERD without esophagitis, [place] (hard of hearing), Hyperlipidemia, Hypertension, Hypothyroidism, Impaired functional mobility, balance, gait, and endurance, Overactive bladder, Seasonal allergies, and Wears dentures.    Past Surgical History: Patient  has a past surgical history that includes Coronary artery bypass graft (); Other surgical history; Other surgical history; Cardiac catheterization; Hernia repair; Mouth surgery; ORIF wrist fracture  "(Right, 6/22/2018); Cataract extraction w/ intraocular lens implant (Right, 6/21/2021); and Cataract extraction w/ intraocular lens implant (Left, 7/19/2021).    Social History: Patient  reports that he has quit smoking. He has never used smokeless tobacco. He reports that he does not drink alcohol and does not use drugs.    Family History:  Patient's family history has been reviewed and found to be noncontributory.     Allergies:      Metoprolol    Home Medications:    Prior to Admission Medications       Prescriptions Last Dose Informant Patient Reported? Taking?    aspirin (aspirin) 81 MG EC tablet   No No    Take 1 tablet by mouth Daily.    finasteride (PROSCAR) 5 MG tablet  Self Yes No    Take 5 mg by mouth daily.    levothyroxine (SYNTHROID, LEVOTHROID) 88 MCG tablet   Yes No    Take 100 mcg by mouth Daily. Patient taking 100 mcg qd    lisinopril (PRINIVIL,ZESTRIL) 2.5 MG tablet  Self Yes No    Take 2.5 mg by mouth Daily.    nitroglycerin (NITROSTAT) 0.4 MG SL tablet  Self Yes No    Place 0.4 mg under the tongue Every 5 (Five) Minutes As Needed.    Patient not taking:  Reported on 6/16/2022    tamsulosin (FLOMAX) 0.4 MG capsule 24 hr capsule   Yes No    Take 1 capsule by mouth 2 (Two) Times a Day.          ED Medications:    Medications   sodium chloride 0.9 % flush 10 mL (has no administration in time range)     Vital Signs:  Temp:  [97.7 °F (36.5 °C)] 97.7 °F (36.5 °C)  Heart Rate:  [67-90] 67  Resp:  [17] 17  BP: (140-156)/(75-88) 154/75        05/17/24  0133   Weight: 81.1 kg (178 lb 12.7 oz)     Body mass index is 26.4 kg/m².    Physical Exam:     Most recent vital Signs: /75   Pulse 67   Temp 97.7 °F (36.5 °C) (Oral)   Resp 17   Ht 175.3 cm (69\")   Wt 81.1 kg (178 lb 12.7 oz)   SpO2 94%   BMI 26.40 kg/m²     Physical Exam  Vitals reviewed.   Constitutional:       General: He is not in acute distress.  HENT:      Head: Normocephalic and atraumatic.      Right Ear: External ear normal.      " "Left Ear: External ear normal.      Mouth/Throat:      Mouth: Mucous membranes are moist.      Pharynx: Oropharynx is clear.   Eyes:      Extraocular Movements: Extraocular movements intact.      Conjunctiva/sclera: Conjunctivae normal.   Cardiovascular:      Rate and Rhythm: Normal rate and regular rhythm.      Pulses: Normal pulses.      Heart sounds: Normal heart sounds.   Pulmonary:      Effort: Pulmonary effort is normal.      Breath sounds: Normal breath sounds.   Abdominal:      General: Bowel sounds are normal.      Palpations: Abdomen is soft.   Musculoskeletal:      Right lower leg: No edema.      Left lower leg: No edema.   Skin:     General: Skin is warm and dry.   Neurological:      General: No focal deficit present.      Mental Status: He is alert and oriented to person, place, and time.   Psychiatric:         Behavior: Behavior normal.         Laboratory data:    I have reviewed the labs done in the emergency room.    Results from last 7 days   Lab Units 05/17/24  0129   SODIUM mmol/L 137   POTASSIUM mmol/L 3.7   CHLORIDE mmol/L 102   CO2 mmol/L 22.8   BUN mg/dL 10   CREATININE mg/dL 1.09   CALCIUM mg/dL 8.4*   BILIRUBIN mg/dL 0.5   ALK PHOS U/L 98   ALT (SGPT) U/L 13   AST (SGOT) U/L 16   GLUCOSE mg/dL 121*     Results from last 7 days   Lab Units 05/17/24  0129   WBC 10*3/mm3 12.03*   HEMOGLOBIN g/dL 15.1   HEMATOCRIT % 45.1   PLATELETS 10*3/mm3 191         Results from last 7 days   Lab Units 05/17/24  0339 05/17/24  0129   HSTROP T ng/L 19 9     Results from last 7 days   Lab Units 05/17/24  0129   PROBNP pg/mL 310.1                       Invalid input(s): \"USDES\", \"NITRITITE\", \"BACT\", \"EP\"    Pain Management Panel          Latest Ref Rng & Units 6/21/2015   Pain Management Panel   Amphetamine, Urine Qual NEGATIVE ng/mL Negative    Barbiturates Screen, Urine NEGATIVE ng/mL Negative    Benzodiazepine Screen, Urine NEGATIVE ng/mL Negative    Cocaine Screen, Urine NEGATIVE ng/mL Negative    Methadone " Screen , Urine NEGATIVE ng/mL Negative        EKG:      EKG personally reviewed, sinus rhythm with rate of 93 bpm.  Occasional PVC.  No acute ST or T wave changes.    Radiology:    No radiology results for the last 3 days    Assessment:    Unstable angina POA  CAD status post CABG  Hypertension  Hyperlipidemia  Hypothyroidism  BPH  Impaired mobility and ADL    Plan:    Unstable angina  -Troponins trended upward with significant delta  -Currently chest pain-free  -Lipid panel and A1c pending  -2D echo  -Therapeutic Lovenox, high intensity statin, aspirin  -Cardiology consulted and appreciate recommendation    Further orders as clinical course dictate    Risk Assessment: High  DVT Prophylaxis: Therapeutic Lovenox  Code Status: Full  Diet: N.p.o.            Pablo Holt DO  05/17/24  05:23 EDT    Dictated utilizing Dragon dictation.

## 2024-05-17 NOTE — CASE MANAGEMENT/SOCIAL WORK
Discharge Planning Assessment  [facility]     Patient Name: Remington Rojas  MRN: [id number]  Today's Date: 5/17/2024    Admit Date: 5/17/2024    Plan: The patient is awake and able to answer questions.  He is a current resident at [place] where he is followed by Karoline Aceves and gets his medications from [facility].  He elects to enroll in Meds to Bed.  He uses a walker and wheelchair; he denies the need for additional DME at [place].  At the time of [place] the patient plans to return to [facility]. Questions and concerns were addressed, GUTIÉRREZ delivered at the time of this conversation.  Will provide additional resource and information upon request.   Discharge Needs Assessment       Row Name 05/17/24 1302       Living Environment    People in Home facility resident    Unique Family Situation [facility]    Current Living Arrangements assisted living facility    Duration at Residence 1 1/2 years    Potentially Unsafe Housing Conditions none    In the past 12 months has the electric, gas, oil, or water company threatened to shut off services in your home? No    Primary Care Provided by self;other (see comments)  Nursing staff at [facility]    Provides Primary Care For no one    Family Caregiver if Needed none    Quality of Family Relationships helpful;involved;supportive    Able to Return to Prior Arrangements yes       Resource/Environmental Concerns    Resource/Environmental Concerns none    Transportation Concerns none       Transportation Needs    In the past 12 months, has lack of transportation kept you from medical appointments or from getting medications? no    In the past 12 months, has lack of transportation kept you from meetings, work, or from getting things needed for daily living? No       Food Insecurity    Within the past 12 months, you worried that your food would run out before you got the money to buy more. Never true    Within the past 12 months, the food you bought just didn't  last and you didn't have money to get more. Never true       Transition Planning    Patient/Family Anticipates Transition to home    Patient/Family Anticipated Services at Transition none       Discharge Needs Assessment    Readmission Within the Last 30 Days no previous admission in last 30 days    Current Outpatient/Agency/Support Group assisted living facility    Equipment Currently Used at Home wheelchair;walker, rolling    Concerns to be Addressed denies needs/concerns at this time    Anticipated Changes Related to Illness inability to care for self    Equipment Needed After Discharge none    Discharge Facility/Level of Care Needs assisted living facility    Provided Post Acute Provider List? N/A    N/A Provider List Comment Patient plans to return to same [facility]; no new DME needs    Provided Post Acute Provider Quality & Resource List? N/A    N/A Quality & Resource List Comment Patient plans to return to same [facility]; no new DME needs    Patient's Choice of Community Agency(s) [facility]                   Discharge Plan       Row Name 05/17/24 1304       Plan    Plan The patient is awake and able to answer questions.  He is a current resident at [facility] where he is followed by Karoline Aceves and gets his medications from [facility].  He elects to enroll in Meds to Bed.  He uses a walker and wheelchair; he denies the need for additional DME at [place].  At the time of [place] the patient plans to return to [facility]. Questions and concerns were addressed, GUTIÉRREZ delivered at the time of this conversation.  Will provide additional resource and information upon request.    Patient/Family in Agreement with Plan unable to assess    Provided Post Acute Provider List? N/A    N/A Provider List Comment Patient plans to return to same [facility]; no new DME needs    Provided Post Acute Provider Quality & Resource List? N/A    N/A Quality & Resource List Comment Patient plans to return to same senior care; no new DME needs    Plan  Comments Patient denies needs at this time    Final Discharge Disposition Code 01 - home or self-care    Final Note Patient plans to return to [facility] - Admitted Since 5/17/2024    No active coordination exists for this encounter.          Demographic Summary       Row Name 05/17/24 1301       General Information    Admission Type observation    Arrived From emergency department    Required Notices Provided Observation Status Notice    Referral Source admission list    Reason for Consult discharge planning    Preferred Language English       Contact Information    Permission Granted to Share Info With                    Functional Status       Row Name 05/17/24 1301       Functional Status    Usual Activity Tolerance good    Current Activity Tolerance moderate       Physical Activity    On average, how many days per week do you engage in moderate to strenuous exercise (like a brisk walk)? 0 days    On average, how many minutes do you engage in exercise at this level? 0 min    Number of minutes of exercise per week 0       Assessment of Health Literacy    How often do you have someone help you read hospital materials? Occasionally    How often do you have problems learning about your medical condition because of difficulty understanding written information? Occasionally    How often do you have a problem understanding what is told to you about your medical condition? Occasionally    How confident are you filling out medical forms by yourself? Quite a bit    Health Literacy Good       Functional Status, IADL    Medications completely dependent    Meal Preparation completely dependent    Housekeeping completely dependent    Laundry completely dependent    Shopping completely dependent       Mental Status    General Appearance WDL WDL       Mental Status Summary    Recent Changes in Mental Status/Cognitive Functioning no changes                    Psychosocial       Row Name 05/17/24 1302       Values/Beliefs    Spiritual, Cultural Beliefs, Mandaen Practices, Values that Affect Care no       Behavior WDL    Behavior WDL WDL       Emotion Mood WDL    Emotion/Mood/Affect WDL WDL       Speech WDL    Speech WDL WDL       Perceptual State WDL    Perceptual State WDL WDL       Thought Process WDL    Thought Process WDL WDL       Intellectual Performance WDL    Intellectual Performance WDL WDL                   Abuse/Neglect       Row Name 05/17/24 1302       Personal Safety    Feels Unsafe at Home or Work/School no    Feels Threatened by Someone no    Does Anyone Try to Keep You From Having Contact with Others or Doing Things Outside Your Home? no    Physical Signs of Abuse Present no                   Legal       Row Name 05/17/24 1302       Financial Resource Strain    How hard is it for you to pay for the very basics like food, housing, medical care, and heating? Not hard                   Substance Abuse       Row Name 05/17/24 1302       Substance Use    Substance Use Status never used                   Patient Forms       Row Name 05/17/24 1306       Patient Forms    Patient Observation Letter Delivered    Delivered to Patient    Method of delivery In person                      Divya Del Real RN

## 2024-05-17 NOTE — CONSULTS
G-Cardiology Consult Note    Referring Provider: Yanet  Reason for Consultation: Chest pain    Patient Care Team:  Karoline Aceves MD as PCP - [facility]  Anil Villavicencio DO as PCP - Internal Medicine ([facility])  Madelyn Jackson APRN as PCP - Family Medicine ([facility])    Chief complaint : Chest pain    Subjective:    History of present illness: This is an 88-year-old male patient with known coronary artery disease who is brought to the emergency room from his long-term care facility after he was awakened from sleep with chest discomfort around midnight 2 days ago.  The patient indicates he has had no recurrent chest pain while in the hospital here.  His twelve-lead electrocardiogram showed sinus rhythm with no ischemic ST-T wave changes or injury current.  Cardiac troponin was normal x 2 sets.  A bedside echocardiogram showed a normal ejection fraction with no regional wall motion abnormalities.  Left ventricular diastolic function was normal.  There was mild mitral regurgitation and mild tricuspid regurgitation but no significant valvular heart disease and no evidence of pulmonary hypertension.  The patient has prior coronary artery bypass surgery.  He is a patient of Dr. Parks.  He indicates he saw Dr. Parks in the cardiology clinic approximately 1 year ago.    Review of Systems   Review of Systems   Constitutional: Negative for chills, diaphoresis, fever, malaise/fatigue, weight gain and weight loss.   HENT:  Negative for ear discharge, hearing loss, hoarse voice and nosebleeds.    Eyes:  Negative for discharge, double vision, pain and photophobia.   Cardiovascular:  Positive for chest pain. Negative for claudication, cyanosis, dyspnea on exertion, irregular heartbeat, leg swelling, near-syncope, orthopnea, palpitations, paroxysmal nocturnal dyspnea and syncope.   Respiratory:  Negative for cough, hemoptysis, sputum production and wheezing.    Endocrine:  "Negative for cold intolerance, heat intolerance, polydipsia, polyphagia and polyuria.   Hematologic/Lymphatic: Negative for adenopathy and bleeding problem. Does not bruise/bleed easily.   Skin:  Negative for color change, flushing, itching and rash.   Musculoskeletal:  Negative for muscle cramps, muscle weakness, myalgias and stiffness.   Gastrointestinal:  Negative for abdominal pain, diarrhea, hematemesis, hematochezia, nausea and vomiting.   Genitourinary:  Negative for dysuria, frequency and nocturia.   Neurological:  Negative for focal weakness, loss of balance, numbness, paresthesias and seizures.   Psychiatric/Behavioral:  Negative for altered mental status, hallucinations and suicidal ideas.    Allergic/Immunologic: Negative for HIV exposure, hives and persistent infections.       History  Past Medical History:   Diagnosis Date    Acute myocardial infarction     \"ABOUT 8-10 YEARS AGO\" PATIENT REPORTS    BPH (benign prostatic hyperplasia)     CAD (coronary artery disease)     Elevated cholesterol     REPORTS HAS BEEN ON MEDICATION IN THE PAST    GERD without esophagitis     [place] (hard of hearing)     Hyperlipidemia     UNSPECIFIED    Hypertension     Hypothyroidism     Impaired functional mobility, balance, gait, and endurance     Overactive bladder     Seasonal allergies     Wears dentures    ,   Past Surgical History:   Procedure Laterality Date    CARDIAC CATHETERIZATION      REPORTS \"ABOUT 8-10 YEARS AGO\" HAD 2 STENTS PLACED    CATARACT EXTRACTION W/ INTRAOCULAR LENS IMPLANT Right 6/21/2021    Procedure: CATARACT PHACO EXTRACTION WITH INTRAOCULAR LENS IMPLANT RIGHT COMLEX W/ MALYUGIN RING;  Surgeon: Td Palmer MD;  Location: [facility];  Service: Ophthalmology;  Laterality: Right;    CATARACT EXTRACTION W/ INTRAOCULAR LENS IMPLANT Left 7/19/2021    Procedure: CATARACT PHACO EXTRACTION WITH INTRAOCULAR LENS IMPLANT LEFT COMPLEX W/ MALYUGIN RING;  Surgeon: Td Palmer MD;  Location[facility]" "OR;  Service: Ophthalmology;  Laterality: Left;    CORONARY ARTERY BYPASS GRAFT  2006    HERNIA REPAIR      BILATERAL INGUINAL HERNIA REPAIRS WITH MESH WITHIN 3 YEARS    MOUTH SURGERY      FULL TEETH EXTRACTION    ORIF WRIST FRACTURE Right 6/22/2018    Procedure: OPEN REDUCTION INTERNAL FIXATION WITH VOLAR PLATE (SYNTHES), RIGHT WRIST;  Surgeon: Sen Flanagan MD;  Location: [facility] OR;  Service: Orthopedics    OTHER SURGICAL HISTORY      INGUINAL HERNIA REPAIR BILATERAL    OTHER SURGICAL HISTORY      PREVIOUS STENT PLACEMENT   ,   Family History   Problem Relation Age of Onset    Coronary artery disease Mother     Coronary artery disease Sister    ,   Social History     Tobacco Use    Smoking status: Former    Smokeless tobacco: Never   Vaping Use    Vaping status: Never Used   Substance Use Topics    Alcohol use: No    Drug use: No   , (Not in a hospital admission)   and Allergies:  Metoprolol    Objective:    Vital Sign Min/Max for last 24 hours  Temp  Min: 97.7 °F (36.5 °C)  Max: 97.7 °F (36.5 °C)   BP  Min: 126/65  Max: 156/88   Pulse  Min: 63  Max: 90   Resp  Min: 17  Max: 17   SpO2  Min: 94 %  Max: 98 %   No data recorded   Weight  Min: 81.1 kg (178 lb 12.7 oz)  Max: 81.1 kg (178 lb 12.7 oz)     Flowsheet Rows      Flowsheet Row First Filed Value   Admission Height 175.3 cm (69\") Documented at 05/17/2024 0133   Admission Weight 81.1 kg (178 lb 12.7 oz) Documented at 05/17/2024 0133                 Physical Exam:   Vitals and nursing note reviewed.   Constitutional:       Appearance: Healthy appearance. Not in distress.   Neck:      Vascular: No JVR. JVD normal.   Pulmonary:      Effort: Pulmonary effort is normal.      Breath sounds: Normal breath sounds. No wheezing. No rhonchi. No rales.   Chest:      Chest wall: Not tender to palpatation.   Cardiovascular:      PMI at left midclavicular line. Normal rate. Regular rhythm. Normal S1. Normal S2.       Murmurs: There is no murmur.      No gallop.  No click. No " rub.   Pulses:     Intact distal pulses.   Edema:     Peripheral edema absent.   Abdominal:      General: Bowel sounds are normal.      Palpations: Abdomen is soft.      Tenderness: There is no abdominal tenderness.   Musculoskeletal: Normal range of motion.         General: No tenderness. Skin:     General: Skin is warm and dry.   Neurological:      General: No focal deficit present.      Mental Status: Alert and oriented to person, place and time.         Results Review:   I reviewed the patient's new clinical results.  Results from last 7 days   Lab Units 05/17/24  0632 05/17/24  0129   WBC 10*3/mm3 10.04 12.03*   HEMOGLOBIN g/dL 14.2 15.1   HEMATOCRIT % 43.2 45.1   PLATELETS 10*3/mm3 186 191     Results from last 7 days   Lab Units 05/17/24  0632 05/17/24  0129   SODIUM mmol/L 138 137   POTASSIUM mmol/L 3.7 3.7   CHLORIDE mmol/L 106 102   CO2 mmol/L 22.5 22.8   BUN mg/dL 9 10   CREATININE mg/dL 0.92 1.09   GLUCOSE mg/dL 107* 121*   CALCIUM mg/dL 8.3* 8.4*     Lab Results   Lab Value Date/Time    TROPONINT 19 05/17/2024 0339    TROPONINT 9 05/17/2024 0129    TROPONINT <0.010 06/15/2022 2335    TROPONINT <0.010 11/25/2019 1907     Results from last 7 days   Lab Units 05/17/24  0632   CHOLESTEROL mg/dL 153   TRIGLYCERIDES mg/dL 89   HDL CHOL mg/dL 35*   LDL CHOL mg/dL 101*         Assessment/Plan:      Acute chest pain      The patient may be discharged to home from my perspective.  Arrangements should be made for the patient to follow-up with his regular established cardiologist Dr. Godfrey Parks.  Outpatient stress testing can be arranged at that clinic visit if deemed appropriate.  The patient is not a candidate for invasive management strategies.  I have recommended starting Imdur 30 mg orally every morning.  I have recommended sublingual nitroglycerin to use on an as-needed basis.  I have recommended additional antianginal therapy with amlodipine 2.5 mg once per day for improved blood pressure control.  I would  tend to avoid beta-blockade since his resting heart rate is 60 bpm.    I discussed the patient's findings and my recommendations with patient    Varun Lee MD  05/17/24  11:09 EDT

## 2024-05-17 NOTE — ED PROVIDER NOTES
" EMERGENCY DEPARTMENT ENCOUNTER    Pt Name: Remington Rojas  MRN: [id number]  Pt :   1935  Room Number:    Date of encounter:  2024  PCP: Karoline Aceves MD  ED Provider: Camilo West MD    Historian: Patient and EMS      HPI:  Chief Complaint: Chest pain        Context: Remington Rojas is a 88 y.o. male who presents to the ED c/o chest pain.  Patient states that he woke up from sleep with acute left-sided chest pain that radiated into his left arm.  EMS was called and provided patient with 324 mg oral aspirin.  Patient states that chest pain fully resolved upon coming into the emergency department and denies any current symptoms.  Denies any recent illness.  Denies any cough or difficulty breathing.  Patient does report previous myocardial infarction around 10 years ago, on 81 mg aspirin daily.      PAST MEDICAL HISTORY  Past Medical History:   Diagnosis Date    Acute myocardial infarction     \"ABOUT 8-10 YEARS AGO\" PATIENT REPORTS    BPH (benign prostatic hyperplasia)     CAD (coronary artery disease)     Elevated cholesterol     REPORTS HAS BEEN ON MEDICATION IN THE PAST    GERD without esophagitis     [place] (hard of hearing)     Hyperlipidemia     UNSPECIFIED    Hypertension     Hypothyroidism     Impaired functional mobility, balance, gait, and endurance     Overactive bladder     Seasonal allergies     Wears dentures          PAST SURGICAL HISTORY  Past Surgical History:   Procedure Laterality Date    CARDIAC CATHETERIZATION      REPORTS \"ABOUT 8-10 YEARS AGO\" HAD 2 STENTS PLACED    CATARACT EXTRACTION W/ INTRAOCULAR LENS IMPLANT Right 2021    Procedure: CATARACT PHACO EXTRACTION WITH INTRAOCULAR LENS IMPLANT RIGHT COMLEX W/ MALYUGIN RING;  Surgeon: Td Palmer MD;  Location: [facility];  Service: Ophthalmology;  Laterality: Right;    CATARACT EXTRACTION W/ INTRAOCULAR LENS IMPLANT Left 2021    Procedure: CATARACT PHACO EXTRACTION WITH INTRAOCULAR LENS IMPLANT " LEFT COMPLEX W/ MALYUGIN RING;  Surgeon: Td Palmer MD;  Location: [facility];  Service: Ophthalmology;  Laterality: Left;    CORONARY ARTERY BYPASS GRAFT  2006    HERNIA REPAIR      BILATERAL INGUINAL HERNIA REPAIRS WITH MESH WITHIN 3 YEARS    MOUTH SURGERY      FULL TEETH EXTRACTION    ORIF WRIST FRACTURE Right 6/22/2018    Procedure: OPEN REDUCTION INTERNAL FIXATION WITH VOLAR PLATE (SYNTHES), RIGHT WRIST;  Surgeon: Sen Flanagan MD;  Location: [facility] OR;  Service: Orthopedics    OTHER SURGICAL HISTORY      INGUINAL HERNIA REPAIR BILATERAL    OTHER SURGICAL HISTORY      PREVIOUS STENT PLACEMENT         FAMILY HISTORY  Family History   Problem Relation Age of Onset    Coronary artery disease Mother     Coronary artery disease Sister          SOCIAL HISTORY  Social History     Socioeconomic History    Marital status:    Tobacco Use    Smoking status: Former    Smokeless tobacco: Never   Substance and Sexual Activity    Alcohol use: No    Drug use: No    Sexual activity: Defer         ALLERGIES  Metoprolol        REVIEW OF SYSTEMS  Review of Systems     All systems reviewed and negative except for those discussed in HPI.       PHYSICAL EXAM    I have reviewed the triage vital signs and nursing notes.    ED Triage Vitals   Temp Heart Rate Resp BP SpO2   05/17/24 0123 05/17/24 0123 05/17/24 0123 05/17/24 0124 05/17/24 0124   97.7 °F (36.5 °C) 72 17 156/88 98 %      Temp src Heart Rate Source Patient Position BP Location FiO2 (%)   05/17/24 0123 -- 05/17/24 0123 05/17/24 0123 --   Oral  Sitting Left arm        Physical Exam    General:  Awake, alert, elderly, no acute distress  HEENT: Atraumatic, normocephalic, EOMI, PERRLA, mucous membranes moist  NECK:  Supple, atraumatic  Cardiovascular:  Regular rate, regular rhythm, no murmurs, rubs, or gallops.  Extremities well perfused   Respiratory:  Regular rate, clear lungs to auscultation bilaterally.  No rhonchi, rales, wheezing  Abdominal:  Soft,  nondistended, nontender.  No guarding or rebound.  No palpable masses  Extremity:  No visible bony abnormalities in all 4 extremities.  Full range of motion of all extremities.  Skin:  Warm and dry.  No rashes  Neuro:  AAOx3, GCS 15. Cranial nerves 2-12 grossly intact.  No focal strength or sensation deficits.  Psych:  Mood and affect appropriate.        LAB RESULTS  Recent Results (from the past 24 hour(s))   Comprehensive Metabolic Panel    Collection Time: 05/17/24  1:29 AM    Specimen: Blood   Result Value Ref Range    Glucose 121 (H) 65 - 99 mg/dL    BUN 10 8 - 23 mg/dL    Creatinine 1.09 0.76 - 1.27 mg/dL    Sodium 137 136 - 145 mmol/L    Potassium 3.7 3.5 - 5.2 mmol/L    Chloride 102 98 - 107 mmol/L    CO2 22.8 22.0 - 29.0 mmol/L    Calcium 8.4 (L) 8.6 - 10.5 mg/dL    Total Protein 7.0 6.0 - 8.5 g/dL    Albumin 3.7 3.5 - 5.2 g/dL    ALT (SGPT) 13 1 - 41 U/L    AST (SGOT) 16 1 - 40 U/L    Alkaline Phosphatase 98 39 - 117 U/L    Total Bilirubin 0.5 0.0 - 1.2 mg/dL    Globulin 3.3 gm/dL    A/G Ratio 1.1 g/dL    BUN/Creatinine Ratio 9.2 7.0 - 25.0    Anion Gap 12.2 5.0 - 15.0 mmol/L    eGFR 66.1 >60.0 mL/min/1.73   High Sensitivity Troponin T    Collection Time: 05/17/24  1:29 AM    Specimen: Blood   Result Value Ref Range    HS Troponin T 9 <22 ng/L   Green Top (Gel)    Collection Time: 05/17/24  1:29 AM   Result Value Ref Range    Extra Tube Hold for add-ons.    Lavender Top    Collection Time: 05/17/24  1:29 AM   Result Value Ref Range    Extra Tube hold for add-on    Gold Top - SST    Collection Time: 05/17/24  1:29 AM   Result Value Ref Range    Extra Tube Hold for add-ons.    Light Blue Top    Collection Time: 05/17/24  1:29 AM   Result Value Ref Range    Extra Tube Hold for add-ons.    CBC Auto Differential    Collection Time: 05/17/24  1:29 AM    Specimen: Blood   Result Value Ref Range    WBC 12.03 (H) 3.40 - 10.80 10*3/mm3    RBC 4.72 4.14 - 5.80 10*6/mm3    Hemoglobin 15.1 13.0 - 17.7 g/dL     Hematocrit 45.1 37.5 - 51.0 %    MCV 95.6 79.0 - 97.0 fL    MCH 32.0 26.6 - 33.0 pg    MCHC 33.5 31.5 - 35.7 g/dL    RDW 11.8 (L) 12.3 - 15.4 %    RDW-SD 41.1 37.0 - 54.0 fl    MPV 9.6 6.0 - 12.0 fL    Platelets 191 140 - 450 10*3/mm3    Neutrophil % 50.4 42.7 - 76.0 %    Lymphocyte % 36.9 19.6 - 45.3 %    Monocyte % 8.1 5.0 - 12.0 %    Eosinophil % 2.7 0.3 - 6.2 %    Basophil % 0.7 0.0 - 1.5 %    Immature Grans % 1.2 (H) 0.0 - 0.5 %    Neutrophils, Absolute 6.05 1.70 - 7.00 10*3/mm3    Lymphocytes, Absolute 4.44 (H) 0.70 - 3.10 10*3/mm3    Monocytes, Absolute 0.98 (H) 0.10 - 0.90 10*3/mm3    Eosinophils, Absolute 0.33 0.00 - 0.40 10*3/mm3    Basophils, Absolute 0.09 0.00 - 0.20 10*3/mm3    Immature Grans, Absolute 0.14 (H) 0.00 - 0.05 10*3/mm3    nRBC 0.0 0.0 - 0.2 /100 WBC   BNP    Collection Time: 05/17/24  1:29 AM    Specimen: Blood   Result Value Ref Range    proBNP 310.1 0.0 - 1,800.0 pg/mL   High Sensitivity Troponin T 2Hr    Collection Time: 05/17/24  3:39 AM    Specimen: Blood   Result Value Ref Range    HS Troponin T 19 <22 ng/L    Troponin T Delta 10 (C) >=-4 - <+4 ng/L       If labs were ordered, I independently reviewed the results and considered them in treating the patient.        RADIOLOGY  No Radiology Exams Resulted Within Past 24 Hours    I ordered and independently reviewed the above noted radiographic studies.     See radiologist's dictation for official interpretation.        PROCEDURES    Procedures    ECG 12 Lead ED Triage Standing Order; Chest Pain   Final Result          MEDICATIONS GIVEN IN ER    Medications   sodium chloride 0.9 % flush 10 mL (has no administration in time range)         MEDICAL DECISION MAKING, PROGRESS, and CONSULTS    All labs, if obtained, have been independently reviewed by me.  All radiology studies, if obtained, have been reviewed by me and the radiologist dictating the report.  All EKG's, if obtained, have been independently viewed and interpreted by me.       Discussion below represents my analysis of pertinent findings related to patient's condition, differential diagnosis, treatment plan and final disposition.    Remington Rojas is a 88 y.o. male who presents to the ED c/o chest pain.  Mildly hypertensive on arrival with blood pressure 156/88 but mentating appropriately and nontoxic-appearing.  GCS 15.  Brought in by EMS.  Differential diagnosis includes but is not limited to MI, musculoskeletal chest pain, GERD, stable angina, unstable angina, pneumothorax, rib fracture. Labs obtained including CBC, CMP, Troponin profile. Chest xray and EKG obtained. Patient received total of 324mg ASA prior to arrival.  Asymptomatic on arrival.    EKG independently interpretted by myself and shows sinus rhythm with rate of 93 and occasional PVC but no evidence of acute ischemic changes or significant interval abnormalities otherwise.    Chest x-ray personally interpreted showing cardiomegaly but no evidence of pulmonary edema or acute cardiopulmonary abnormality.    Normal initial troponin.  Mild leukocytosis of 12.  No critical electrolyte imbalances.    2-hour troponin of 19.  Positive delta change.  Due to patient's elevated heart score along with delta change of troponin and high risk chest pain, patient to be admitted by hospitalist for further cardiac observation.  Patient agreeable to this plan.      HEART Score: 5                      Orders placed during this visit:  Orders Placed This Encounter   Procedures    XR Chest 1 View    [place] Draw    Comprehensive Metabolic Panel    High Sensitivity Troponin T    CBC Auto Differential    BNP    High Sensitivity Troponin T 2Hr    NPO Diet NPO Type: Strict NPO    Undress & Gown    Continuous Pulse Oximetry    Oxygen Therapy- Nasal Cannula; Titrate 1-6 LPM Per SpO2; 90 - 95%    ECG 12 Lead ED Triage Standing Order; Chest Pain    Insert Peripheral IV    Initiate Observation Status    CBC & Differential    Green Top (Gel)     Lavender Top    Gold Top - SST    Light Blue Top         ED Course:    Consultants:                  Shared Decision Making:  After my consideration of clinical presentation and any laboratory/radiology studies obtained, I discussed the findings with the patient/patient representative who is in agreement with the treatment plan and the final disposition.   Risks and benefits of discharge and/or observation/admission were discussed.      AS OF 04:52 EDT VITALS:    BP - 154/75  HR - 67  TEMP - 97.7 °F (36.5 °C) (Oral)  O2 SATS - 94%                  DIAGNOSIS  Final diagnoses:   Acute chest pain         DISPOSITION  Admit      Please note that portions of this document were completed with voice recognition software.        Camilo West MD  05/17/24 0451

## 2024-05-17 NOTE — DISCHARGE SUMMARY
[facility]   INTERNAL MEDICINE DISCHARGE SUMMARY      Name:  Remington Rojas   Age:  88 y.o.  Sex:  male  :  1935  MRN:  [id number]   Visit Number:  [id number]  Primary Care Physician:  Karoline Aceves MD  Date of Discharge:  2024  Admission Date:  2024      Discharge Diagnosis:         Acute chest pain  Cad  Htn  Hypothyroidism  Hyperlipidemia        Consults:     Consults       Date and Time Order Name Status Description    2024  5:39 AM Inpatient Cardiology Consult Completed             Procedures Performed:                 Hospital Course:   The patient was admitted on 2024  Please see H&P for details on admission HPI and ROS.  Patient is a chronically ill 88-year-old male with history of CAD status post CABG who presents to the emergency room after acute onset midsternal chest pain that awoke patient from sleep.  Patient reported that pain did radiate into his left arm and was associated with some nausea, no episode of vomiting.  Patient received 324 mg oral aspirin prior to arrival.  Currently chest pain-free.  Upon arrival to the ER patient afebrile, hemodynamically stable and nonhypoxic on room air.  Labs are significant for uptrending troponin 9-19 with a delta of +10.  Normal proBNP.  Unremarkable CMP except for glucose 121.  Unremarkable CBC.  Chest x-ray personally examined, cardiomegaly, sternotomy wires, no acute process.  Given concerning chest pain symptoms with high risk history, hospitalist asked to admit.    After admission he was observed on the telemetry and has not had any concerning abnormalities.  His troponin was followed up and it has actually remained stable.  Cardiology consult has been done and Dr. Lee has seen the patient and he thinks that he would not want to do any test inpatient and is stable to be discharged back home from cardiology point of view.  He would want patient to follow-up with his cardiologist as outpatient  "and further testing to be done as needed by Dr. Parks  He recommended to avoid beta-blocker because of bradycardia and give him to as as low-dose of amlodipine  Patient will be discharged back to assisted living place and follow-up with Dr. Parks has been recommended      Vital Signs:     Temp:  [97.7 °F (36.5 °C)] 97.7 °F (36.5 °C)  Heart Rate:  [63-90] 74  Resp:  [16-17] 16  BP: (117-156)/(64-88) 117/65    Physical Exam:     General Appearance:    Alert and cooperative, not in any acute distress.   Head:    Atraumatic and normocephalic, without obvious abnormality.   Eyes:            PERRLA,  No pallor. Extraocular movements are within normal limits.   Neck:   Supple,  No lymph glands, no bruit   Lungs:     Chest shape is normal. Breath sounds heard bilaterally equally.  No crackles or wheezing.     Heart:    Normal S1 and S2, no murmur,  No JVD   Abdomen:     Normal bowel sounds, no masses, no organomegaly. Soft     nontender, no guarding, no rebound tenderness   Extremities:   Moves all extremities well, no edema, no cyanosis,    Skin:   No  bruising or rash.   Neurologic:   Grossly nonfocal and moves all extremities.        Pertinent Lab Results:     Results from last 7 days   Lab Units 05/17/24  0632 05/17/24  0129   SODIUM mmol/L 138 137   POTASSIUM mmol/L 3.7 3.7   CHLORIDE mmol/L 106 102   CO2 mmol/L 22.5 22.8   BUN mg/dL 9 10   CREATININE mg/dL 0.92 1.09   CALCIUM mg/dL 8.3* 8.4*   BILIRUBIN mg/dL  --  0.5   ALK PHOS U/L  --  98   ALT (SGPT) U/L  --  13   AST (SGOT) U/L  --  16   GLUCOSE mg/dL 107* 121*     Results from last 7 days   Lab Units 05/17/24  0632 05/17/24  0129   WBC 10*3/mm3 10.04 12.03*   HEMOGLOBIN g/dL 14.2 15.1   HEMATOCRIT % 43.2 45.1   PLATELETS 10*3/mm3 186 191         No results found for: \"BLOODCX\", \"URINECX\", \"WOUNDCX\", \"MRSACX\"    Pertinent Radiology Results:    Imaging Results (Most Recent)       Procedure Component Value Units Date/Time    XR Chest 1 View [[id number]] " Collected: 05/17/24 0817     Updated: 05/17/24 1036    Narrative:      PROCEDURE: XR CHEST 1 VW-     HISTORY: Chest Pain Triage Protocol     COMPARISON: 06/15/2022     FINDINGS: No acute pulmonary opacity is present. There is no evidence of  effusion or pneumothorax.        There is evidence of prior median sternotomy, presumably from CABG.   Otherwise, mediastinum is unremarkable.        Heart size is mildly enlarged but stable.       Impression:      Unremarkable chest, status post CABG.              This report was signed and finalized on 5/17/2024 10:34 AM by Clifford Bhatti MD.                       Discharge Disposition:          Discharge Medication:      Imdur 30 mg daily  Amlodipine 2.5 mg daily     Discharge Medications        ASK your doctor about these medications        Instructions Start Date   aspirin 81 MG EC tablet   81 mg, Oral, Daily      finasteride 5 MG tablet  Commonly known as: PROSCAR   5 mg, Oral, Daily      levothyroxine 88 MCG tablet  Commonly known as: SYNTHROID, LEVOTHROID   100 mcg, Oral, Daily, Patient taking 100 mcg qd      lisinopril 2.5 MG tablet  Commonly known as: PRINIVIL,ZESTRIL   2.5 mg, Oral, Daily      nitroglycerin 0.4 MG SL tablet  Commonly known as: NITROSTAT   0.4 mg, Every 5 Minutes PRN      tamsulosin 0.4 MG capsule 24 hr capsule  Commonly known as: FLOMAX   1 capsule, Oral, 2 Times Daily               Discharge Diet:             Follow-up Appointments:          Follow-up with primary care   recommend follow-up with Dr. Parks    Test Results Pending at Discharge:             Shin Smith MD  05/17/24  14:28 EDT    Time:  Discharge 24 min    Please note that portions of this note were completed with a voice recognition program.

## 2024-05-17 NOTE — PLAN OF CARE
Goal Outcome Evaluation: Patient to be discharged home today

## 2024-05-18 NOTE — PAYOR COMM NOTE
"FROM:ELLIS SPENCER RN PHONE [phone]  [facility]      Amanda Saravia (88 y.o. Male)       Date of Birth   1935    Social Security Number       Address   [address] [place] [address]    Home Phone   [phone]    MRN   [id number]       Hinduism   None    Marital Status                               Admission Date   24    Admission Type   Emergency    Admitting Provider   Pablo Holt DO    Attending Provider       Department, Room/Bed   [facility] TELEMETRY 3, 319/1       Discharge Date   2024    Discharge Disposition   Home or Self Care    Discharge Destination                                 Attending Provider: (none)   Allergies: Metoprolol    Isolation: None   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)   Code Status: Prior    Ht: 175.3 cm (69.02\")   Wt: 81.1 kg (178 lb 12.7 oz)    Admission Cmt: None   Principal Problem: Acute chest pain [R07.9]                   Active Insurance as of 2024       Primary Coverage       Payor Plan Insurance Group Employer/Plan Group    HUMANA MEDICARE REPLACEMENT HUMANA MED ADV HMO [id number]       Payor Plan Address Payor Plan Phone Number Payor Plan Fax Number Effective Dates    [address] [address] [phone]  2023 - None Entered    [facility] [id number]         Subscriber Name Subscriber Birth Date Member ID       AMANDA SARAVIA 1935 [id number]                     Emergency Contacts        (Rel.) Home Phone Work Phone Mobile Phone    Reagan Saravia (Son) [phone] -- --    Anya Saravia (Sister) [phone] -- --                 Discharge Summary        Shin Smith MD at 24 1428              [facility]   INTERNAL MEDICINE DISCHARGE SUMMARY      Name:  Amanda Saravia   Age:  88 y.o.  Sex:  male  :  1935  MRN:  [id number]   Visit Number:  [id number]  Primary Care Physician:  Karoline Aceves MD  Date of Discharge:  " 5/17/2024  Admission Date:  5/17/2024      Discharge Diagnosis:         Acute chest pain  Cad  Htn  Hypothyroidism  Hyperlipidemia        Consults:     Consults       Date and Time Order Name Status Description    5/17/2024  5:39 AM Inpatient Cardiology Consult Completed             Procedures Performed:                 Hospital Course:   The patient was admitted on 5/17/2024  Please see H&P for details on admission HPI and ROS.  Patient is a chronically ill 88-year-old male with history of CAD status post CABG who presents to the emergency room after acute onset midsternal chest pain that awoke patient from sleep.  Patient reported that pain did radiate into his left arm and was associated with some nausea, no episode of vomiting.  Patient received 324 mg oral aspirin prior to arrival.  Currently chest pain-free.  Upon arrival to the ER patient afebrile, hemodynamically stable and nonhypoxic on room air.  Labs are significant for uptrending troponin 9-19 with a delta of +10.  Normal proBNP.  Unremarkable CMP except for glucose 121.  Unremarkable CBC.  Chest x-ray personally examined, cardiomegaly, sternotomy wires, no acute process.  Given concerning chest pain symptoms with high risk history, hospitalist asked to admit.    After admission he was observed on the telemetry and has not had any concerning abnormalities.  His troponin was followed up and it has actually remained stable.  Cardiology consult has been done and Dr. Lee has seen the patient and he thinks that he would not want to do any test inpatient and is stable to be discharged back home from cardiology point of view.  He would want patient to follow-up with his cardiologist as outpatient and further testing to be done as needed by Dr. Parks  He recommended to avoid beta-blocker because of bradycardia and give him to as as low-dose of amlodipine  Patient will be discharged back to assisted living place and follow-up with Dr. Parks has been  "recommended      Vital Signs:     Temp:  [97.7 °F (36.5 °C)] 97.7 °F (36.5 °C)  Heart Rate:  [63-90] 74  Resp:  [16-17] 16  BP: (117-156)/(64-88) 117/65    Physical Exam:     General Appearance:    Alert and cooperative, not in any acute distress.   Head:    Atraumatic and normocephalic, without obvious abnormality.   Eyes:            PERRLA,  No pallor. Extraocular movements are within normal limits.   Neck:   Supple,  No lymph glands, no bruit   Lungs:     Chest shape is normal. Breath sounds heard bilaterally equally.  No crackles or wheezing.     Heart:    Normal S1 and S2, no murmur,  No JVD   Abdomen:     Normal bowel sounds, no masses, no organomegaly. Soft     nontender, no guarding, no rebound tenderness   Extremities:   Moves all extremities well, no edema, no cyanosis,    Skin:   No  bruising or rash.   Neurologic:   Grossly nonfocal and moves all extremities.        Pertinent Lab Results:     Results from last 7 days   Lab Units 05/17/24  0632 05/17/24  0129   SODIUM mmol/L 138 137   POTASSIUM mmol/L 3.7 3.7   CHLORIDE mmol/L 106 102   CO2 mmol/L 22.5 22.8   BUN mg/dL 9 10   CREATININE mg/dL 0.92 1.09   CALCIUM mg/dL 8.3* 8.4*   BILIRUBIN mg/dL  --  0.5   ALK PHOS U/L  --  98   ALT (SGPT) U/L  --  13   AST (SGOT) U/L  --  16   GLUCOSE mg/dL 107* 121*     Results from last 7 days   Lab Units 05/17/24  0632 05/17/24  0129   WBC 10*3/mm3 10.04 12.03*   HEMOGLOBIN g/dL 14.2 15.1   HEMATOCRIT % 43.2 45.1   PLATELETS 10*3/mm3 186 191         No results found for: \"BLOODCX\", \"URINECX\", \"WOUNDCX\", \"MRSACX\"    Pertinent Radiology Results:    Imaging Results (Most Recent)       Procedure Component Value Units Date/Time    XR Chest 1 View [[id number]] Collected: 05/17/24 0817     Updated: 05/17/24 1036    Narrative:      PROCEDURE: XR CHEST 1 VW-     HISTORY: Chest Pain Triage Protocol     COMPARISON: 06/15/2022     FINDINGS: No acute pulmonary opacity is present. There is no evidence of  effusion or pneumothorax.   "      There is evidence of prior median sternotomy, presumably from CABG.   Otherwise, mediastinum is unremarkable.        Heart size is mildly enlarged but stable.       Impression:      Unremarkable chest, status post CABG.              This report was signed and finalized on 5/17/2024 10:34 AM by Clifford Bhatti MD.                       Discharge Disposition:          Discharge Medication:      Imdur 30 mg daily  Amlodipine 2.5 mg daily     Discharge Medications        ASK your doctor about these medications        Instructions Start Date   aspirin 81 MG EC tablet   81 mg, Oral, Daily      finasteride 5 MG tablet  Commonly known as: PROSCAR   5 mg, Oral, Daily      levothyroxine 88 MCG tablet  Commonly known as: SYNTHROID, LEVOTHROID   100 mcg, Oral, Daily, Patient taking 100 mcg qd      lisinopril 2.5 MG tablet  Commonly known as: PRINIVIL,ZESTRIL   2.5 mg, Oral, Daily      nitroglycerin 0.4 MG SL tablet  Commonly known as: NITROSTAT   0.4 mg, Every 5 Minutes PRN      tamsulosin 0.4 MG capsule 24 hr capsule  Commonly known as: FLOMAX   1 capsule, Oral, 2 Times Daily               Discharge Diet:             Follow-up Appointments:          Follow-up with primary care   recommend follow-up with Dr. Parks    Test Results Pending at Discharge:             Shin Smith MD  05/17/24  14:28 EDT    Time:  Discharge 24 min    Please note that portions of this note were completed with a voice recognition program.        Electronically signed by Shin Smith MD at 05/17/24 9654

## 2024-09-11 ENCOUNTER — HOSPITAL ENCOUNTER (EMERGENCY)
Facility: HOSPITAL | Age: 89
Discharge: HOME | End: 2024-09-11
Attending: EMERGENCY MEDICINE
Payer: MEDICARE

## 2024-09-11 ENCOUNTER — APPOINTMENT (OUTPATIENT)
Dept: GENERAL RADIOLOGY | Facility: HOSPITAL | Age: 89
End: 2024-09-11
Payer: MEDICARE

## 2024-09-11 ENCOUNTER — TRANSCRIBE ORDERS (OUTPATIENT)
Facility: HOSPITAL | Age: 89
End: 2024-09-11
Payer: MEDICARE

## 2024-09-11 VITALS
SYSTOLIC BLOOD PRESSURE: 109 MMHG | RESPIRATION RATE: 18 BRPM | DIASTOLIC BLOOD PRESSURE: 61 MMHG | WEIGHT: 178.57 LBS | TEMPERATURE: 97.8 F | HEIGHT: 69 IN | BODY MASS INDEX: 26.45 KG/M2 | HEART RATE: 66 BPM | OXYGEN SATURATION: 94 %

## 2024-09-11 DIAGNOSIS — N30.00 ACUTE CYSTITIS WITHOUT HEMATURIA: Primary | ICD-10-CM

## 2024-09-11 LAB
ALBUMIN SERPL-MCNC: 3.8 G/DL (ref 3.5–5.2)
ALBUMIN/GLOB SERPL: 1.2 G/DL
ALP SERPL-CCNC: 89 U/L (ref 39–117)
ALT SERPL W P-5'-P-CCNC: 22 U/L (ref 1–41)
ANION GAP SERPL CALCULATED.3IONS-SCNC: 12.3 MMOL/L (ref 5–15)
AST SERPL-CCNC: 37 U/L (ref 1–40)
BACTERIA UR QL AUTO: ABNORMAL /HPF
BASOPHILS # BLD AUTO: 0.08 10*3/MM3 (ref 0–0.2)
BASOPHILS NFR BLD AUTO: 0.9 % (ref 0–1.5)
BILIRUB SERPL-MCNC: 0.8 MG/DL (ref 0–1.2)
BILIRUB UR QL STRIP: NEGATIVE
BUN SERPL-MCNC: 15 MG/DL (ref 8–23)
BUN/CREAT SERPL: 10.9 (ref 7–25)
CALCIUM SPEC-SCNC: 8.7 MG/DL (ref 8.6–10.5)
CHLORIDE SERPL-SCNC: 100 MMOL/L (ref 98–107)
CLARITY UR: ABNORMAL
CO2 SERPL-SCNC: 22.7 MMOL/L (ref 22–29)
COLOR UR: YELLOW
CREAT SERPL-MCNC: 1.38 MG/DL (ref 0.76–1.27)
D-LACTATE SERPL-SCNC: 2 MMOL/L (ref 0.5–2)
DEPRECATED RDW RBC AUTO: 41.5 FL (ref 37–54)
EGFRCR SERPLBLD CKD-EPI 2021: 49.8 ML/MIN/1.73
EOSINOPHIL # BLD AUTO: 0.01 10*3/MM3 (ref 0–0.4)
EOSINOPHIL NFR BLD AUTO: 0.1 % (ref 0.3–6.2)
ERYTHROCYTE [DISTWIDTH] IN BLOOD BY AUTOMATED COUNT: 11.9 % (ref 12.3–15.4)
GLOBULIN UR ELPH-MCNC: 3.2 GM/DL
GLUCOSE SERPL-MCNC: 128 MG/DL (ref 65–99)
GLUCOSE UR STRIP-MCNC: NEGATIVE MG/DL
HCT VFR BLD AUTO: 44 % (ref 37.5–51)
HGB BLD-MCNC: 14.8 G/DL (ref 13–17.7)
HGB UR QL STRIP.AUTO: ABNORMAL
HOLD SPECIMEN: NORMAL
HOLD SPECIMEN: NORMAL
HYALINE CASTS UR QL AUTO: ABNORMAL /LPF
IMM GRANULOCYTES # BLD AUTO: 0.08 10*3/MM3 (ref 0–0.05)
IMM GRANULOCYTES NFR BLD AUTO: 0.9 % (ref 0–0.5)
KETONES UR QL STRIP: NEGATIVE
LEUKOCYTE ESTERASE UR QL STRIP.AUTO: ABNORMAL
LYMPHOCYTES # BLD AUTO: 2.93 10*3/MM3 (ref 0.7–3.1)
LYMPHOCYTES NFR BLD AUTO: 31.2 % (ref 19.6–45.3)
MAGNESIUM SERPL-MCNC: 2.1 MG/DL (ref 1.6–2.4)
MCH RBC QN AUTO: 32 PG (ref 26.6–33)
MCHC RBC AUTO-ENTMCNC: 33.6 G/DL (ref 31.5–35.7)
MCV RBC AUTO: 95.2 FL (ref 79–97)
MONOCYTES # BLD AUTO: 1.48 10*3/MM3 (ref 0.1–0.9)
MONOCYTES NFR BLD AUTO: 15.7 % (ref 5–12)
NEUTROPHILS NFR BLD AUTO: 4.82 10*3/MM3 (ref 1.7–7)
NEUTROPHILS NFR BLD AUTO: 51.2 % (ref 42.7–76)
NITRITE UR QL STRIP: NEGATIVE
NRBC BLD AUTO-RTO: 0 /100 WBC (ref 0–0.2)
PH UR STRIP.AUTO: 6 [PH] (ref 5–8)
PLATELET # BLD AUTO: 157 10*3/MM3 (ref 140–450)
PMV BLD AUTO: 9.9 FL (ref 6–12)
POTASSIUM SERPL-SCNC: 4 MMOL/L (ref 3.5–5.2)
PROT SERPL-MCNC: 7 G/DL (ref 6–8.5)
PROT UR QL STRIP: ABNORMAL
RBC # BLD AUTO: 4.62 10*6/MM3 (ref 4.14–5.8)
RBC # UR STRIP: ABNORMAL /HPF
REF LAB TEST METHOD: ABNORMAL
SODIUM SERPL-SCNC: 135 MMOL/L (ref 136–145)
SP GR UR STRIP: 1.01 (ref 1–1.03)
SQUAMOUS #/AREA URNS HPF: ABNORMAL /HPF
TROPONIN T SERPL HS-MCNC: 12 NG/L
UROBILINOGEN UR QL STRIP: ABNORMAL
WBC # UR STRIP: ABNORMAL /HPF
WBC NRBC COR # BLD AUTO: 9.4 10*3/MM3 (ref 3.4–10.8)
WHOLE BLOOD HOLD COAG: NORMAL
WHOLE BLOOD HOLD SPECIMEN: NORMAL

## 2024-09-11 PROCEDURE — 25810000003 SODIUM CHLORIDE 0.9 % SOLUTION: Performed by: EMERGENCY MEDICINE

## 2024-09-11 PROCEDURE — 93005 ELECTROCARDIOGRAM TRACING: CPT | Performed by: EMERGENCY MEDICINE

## 2024-09-11 PROCEDURE — 85025 COMPLETE CBC W/AUTO DIFF WBC: CPT | Performed by: EMERGENCY MEDICINE

## 2024-09-11 PROCEDURE — 83735 ASSAY OF MAGNESIUM: CPT | Performed by: EMERGENCY MEDICINE

## 2024-09-11 PROCEDURE — 84484 ASSAY OF TROPONIN QUANT: CPT | Performed by: EMERGENCY MEDICINE

## 2024-09-11 PROCEDURE — 87077 CULTURE AEROBIC IDENTIFY: CPT | Performed by: EMERGENCY MEDICINE

## 2024-09-11 PROCEDURE — 81001 URINALYSIS AUTO W/SCOPE: CPT | Performed by: EMERGENCY MEDICINE

## 2024-09-11 PROCEDURE — 25010000002 TOBRAMYCIN PER 80 MG: Performed by: EMERGENCY MEDICINE

## 2024-09-11 PROCEDURE — 87086 URINE CULTURE/COLONY COUNT: CPT | Performed by: EMERGENCY MEDICINE

## 2024-09-11 PROCEDURE — 83605 ASSAY OF LACTIC ACID: CPT | Performed by: EMERGENCY MEDICINE

## 2024-09-11 PROCEDURE — 96365 THER/PROPH/DIAG IV INF INIT: CPT

## 2024-09-11 PROCEDURE — 80053 COMPREHEN METABOLIC PANEL: CPT | Performed by: EMERGENCY MEDICINE

## 2024-09-11 PROCEDURE — 87186 SC STD MICRODIL/AGAR DIL: CPT | Performed by: EMERGENCY MEDICINE

## 2024-09-11 PROCEDURE — 99284 EMERGENCY DEPT VISIT MOD MDM: CPT

## 2024-09-11 PROCEDURE — 71045 X-RAY EXAM CHEST 1 VIEW: CPT

## 2024-09-11 RX ORDER — SODIUM CHLORIDE 0.9 % (FLUSH) 0.9 %
10 SYRINGE (ML) INJECTION AS NEEDED
Status: DISCONTINUED | OUTPATIENT
Start: 2024-09-11 | End: 2024-09-11 | Stop reason: HOSPADM

## 2024-09-11 RX ORDER — SODIUM CHLORIDE 9 MG/ML
20 INJECTION, SOLUTION INTRAVENOUS ONCE
Status: CANCELLED | OUTPATIENT
Start: 2024-09-12

## 2024-09-11 RX ADMIN — SODIUM CHLORIDE 500 ML: 9 INJECTION, SOLUTION INTRAVENOUS at 11:11

## 2024-09-11 RX ADMIN — TOBRAMYCIN 570 MG: 40 INJECTION INTRAMUSCULAR; INTRAVENOUS at 12:44

## 2024-09-11 NOTE — CASE MANAGEMENT/SOCIAL WORK
Case Management/Social Work    Patient Name:  Remington Rojas  YOB: 1935  MRN: [id number]  Admit Date:  9/11/2024        PAUL spoke with RN regarding pt needing OP infusion vs HH. Pt lives at [facility]. Pt needs IV infusion daily for a week. Provider has arranged for OP infusion but family was stating the difficulty for transportation. SW spoke with [facility] who states they do not have staff to transport at this time. HH will not come to pt daily for infusion. SW offered [facility] [facility] but pt requires assistance in and out of the vehicle per RN. RN will talk to son regarding him having to transport due to no other options. If family unable, PAUL advised RN to speak with provider about possible admission.       Electronically signed by:  TRUNG Velasquez  09/11/24 14:47 EDT

## 2024-09-11 NOTE — ED PROVIDER NOTES
" EMERGENCY DEPARTMENT ENCOUNTER    Pt Name: Remington Rojas  MRN: [id number]  Pt :   1935  Room Number:    Date of encounter:  2024  PCP: Karoline Aceves MD  ED Provider: Chay Tripp MD    Historian: Patient, Family, and EMS      HPI:  Chief Complaint   Patient presents with    Weakness - Generalized    Covid 19    R/O UTI     Presents to the Ed with C/O weakness.  Dx with Covid 19 yesterday. Hx of UTI and c/o dysuria.  Denies any difficulty breathing. Family reported to EMS difficulty with transfers and strong urine smell that usually signifies a UTI          Context: Remington Rojas is a 88 y.o. male who presents to the ED c/o generalized weakness, the patient's had difficulty with transfers, he was diagnosed with COVID yesterday.  The patient additionally had some dysuria and foul-smelling urine, he has a history of significant UTIs with resistant Pseudomonas requiring IV antibiotics.  Patient denies any pain, nausea, vomiting.  Does complain of weakness in a generalized fashion      PAST MEDICAL HISTORY  Past Medical History:   Diagnosis Date    Acute myocardial infarction     \"ABOUT 8-10 YEARS AGO\" PATIENT REPORTS    BPH (benign prostatic hyperplasia)     CAD (coronary artery disease)     Elevated cholesterol     REPORTS HAS BEEN ON MEDICATION IN THE PAST    GERD without esophagitis     [place] (hard of hearing)     Hyperlipidemia     UNSPECIFIED    Hypertension     Hypothyroidism     Impaired functional mobility, balance, gait, and endurance     Overactive bladder     Seasonal allergies     Wears dentures          PAST SURGICAL HISTORY  Past Surgical History:   Procedure Laterality Date    CARDIAC CATHETERIZATION      REPORTS \"ABOUT 8-10 YEARS AGO\" HAD 2 STENTS PLACED    CATARACT EXTRACTION W/ INTRAOCULAR LENS IMPLANT Right 2021    Procedure: CATARACT PHACO EXTRACTION WITH INTRAOCULAR LENS IMPLANT RIGHT COMLEX W/ MALYUGIN RING;  Surgeon: Td Palmer MD;  " Location: [facility] OR;  Service: Ophthalmology;  Laterality: Right;    CATARACT EXTRACTION W/ INTRAOCULAR LENS IMPLANT Left 7/19/2021    Procedure: CATARACT PHACO EXTRACTION WITH INTRAOCULAR LENS IMPLANT LEFT COMPLEX W/ MALYUGIN RING;  Surgeon: Td Palmer MD;  Location: [facility] OR;  Service: Ophthalmology;  Laterality: Left;    CORONARY ARTERY BYPASS GRAFT  2006    HERNIA REPAIR      BILATERAL INGUINAL HERNIA REPAIRS WITH MESH WITHIN 3 YEARS    MOUTH SURGERY      FULL TEETH EXTRACTION    ORIF WRIST FRACTURE Right 6/22/2018    Procedure: OPEN REDUCTION INTERNAL FIXATION WITH VOLAR PLATE (SYNTHES), RIGHT WRIST;  Surgeon: Sen Flanagan MD;  Location: [facility] OR;  Service: Orthopedics    OTHER SURGICAL HISTORY      INGUINAL HERNIA REPAIR BILATERAL    OTHER SURGICAL HISTORY      PREVIOUS STENT PLACEMENT         FAMILY HISTORY  Family History   Problem Relation Age of Onset    Coronary artery disease Mother     Coronary artery disease Sister          SOCIAL HISTORY  Social History     Socioeconomic History    Marital status:    Tobacco Use    Smoking status: Former    Smokeless tobacco: Never   Vaping Use    Vaping status: Never Used   Substance and Sexual Activity    Alcohol use: No    Drug use: No    Sexual activity: Defer         ALLERGIES  Metoprolol        REVIEW OF SYSTEMS  Review of Systems   Constitutional:  Negative for chills and fever.   HENT:  Negative for sore throat and trouble swallowing.    Eyes:  Negative for pain and redness.   Respiratory:  Negative for cough and shortness of breath.    Cardiovascular:  Negative for chest pain and leg swelling.   Gastrointestinal:  Negative for abdominal pain, nausea and vomiting.   Genitourinary:  Negative for dysuria and urgency.   Musculoskeletal:  Negative for back pain and neck pain.   Skin:  Negative for rash and wound.   Neurological:  Positive for weakness. Negative for dizziness.        All systems reviewed and negative except for those discussed  in HPI.       PHYSICAL EXAM    I have reviewed the triage vital signs and nursing notes.    ED Triage Vitals [09/11/24 1014]   Temp Heart Rate Resp BP SpO2   97.8 °F (36.6 °C) 71 18 125/67 95 %      Temp src Heart Rate Source Patient Position BP Location FiO2 (%)   Oral Monitor -- -- --       Physical Exam  Constitutional:       Appearance: Normal appearance. He is not ill-appearing.   HENT:      Head: Normocephalic and atraumatic.      Right Ear: External ear normal.      Left Ear: External ear normal.      Nose: Nose normal.      Mouth/Throat:      Mouth: Mucous membranes are dry.      Pharynx: Oropharynx is clear.   Eyes:      Extraocular Movements: Extraocular movements intact.      Conjunctiva/sclera: Conjunctivae normal.      Pupils: Pupils are equal, round, and reactive to light.   Cardiovascular:      Rate and Rhythm: Normal rate and regular rhythm.      Pulses:           Radial pulses are 2+ on the right side and 2+ on the left side.      Heart sounds: No murmur heard.  Pulmonary:      Effort: Pulmonary effort is normal.      Breath sounds: Normal breath sounds.   Abdominal:      General: There is no distension.      Tenderness: There is no abdominal tenderness. There is no guarding.   Musculoskeletal:         General: No swelling or deformity.      Cervical back: Normal range of motion and neck supple.   Skin:     General: Skin is warm and dry.      Capillary Refill: Capillary refill takes less than 2 seconds.      Findings: No rash.   Neurological:      General: No focal deficit present.      Mental Status: He is alert and oriented to person, place, and time.      GCS: GCS eye subscore is 4. GCS verbal subscore is 5. GCS motor subscore is 6.      Comments: Generally weak            LAB RESULTS  Recent Results (from the past 24 hour(s))   Comprehensive Metabolic Panel    Collection Time: 09/11/24 10:20 AM    Specimen: Blood   Result Value Ref Range    Glucose 128 (H) 65 - 99 mg/dL    BUN 15 8 - 23 mg/dL     Creatinine 1.38 (H) 0.76 - 1.27 mg/dL    Sodium 135 (L) 136 - 145 mmol/L    Potassium 4.0 3.5 - 5.2 mmol/L    Chloride 100 98 - 107 mmol/L    CO2 22.7 22.0 - 29.0 mmol/L    Calcium 8.7 8.6 - 10.5 mg/dL    Total Protein 7.0 6.0 - 8.5 g/dL    Albumin 3.8 3.5 - 5.2 g/dL    ALT (SGPT) 22 1 - 41 U/L    AST (SGOT) 37 1 - 40 U/L    Alkaline Phosphatase 89 39 - 117 U/L    Total Bilirubin 0.8 0.0 - 1.2 mg/dL    Globulin 3.2 gm/dL    A/G Ratio 1.2 g/dL    BUN/Creatinine Ratio 10.9 7.0 - 25.0    Anion Gap 12.3 5.0 - 15.0 mmol/L    eGFR 49.8 (L) >60.0 mL/min/1.73   Single High Sensitivity Troponin T    Collection Time: 09/11/24 10:20 AM    Specimen: Blood   Result Value Ref Range    HS Troponin T 12 <22 ng/L   Magnesium    Collection Time: 09/11/24 10:20 AM    Specimen: Blood   Result Value Ref Range    Magnesium 2.1 1.6 - 2.4 mg/dL   Green Top (Gel)    Collection Time: 09/11/24 10:20 AM   Result Value Ref Range    Extra Tube Hold for add-ons.    Lavender Top    Collection Time: 09/11/24 10:20 AM   Result Value Ref Range    Extra Tube hold for add-on    Gold Top - SST    Collection Time: 09/11/24 10:20 AM   Result Value Ref Range    Extra Tube Hold for add-ons.    Light Blue Top    Collection Time: 09/11/24 10:20 AM   Result Value Ref Range    Extra Tube Hold for add-ons.    CBC Auto Differential    Collection Time: 09/11/24 10:20 AM    Specimen: Blood   Result Value Ref Range    WBC 9.40 3.40 - 10.80 10*3/mm3    RBC 4.62 4.14 - 5.80 10*6/mm3    Hemoglobin 14.8 13.0 - 17.7 g/dL    Hematocrit 44.0 37.5 - 51.0 %    MCV 95.2 79.0 - 97.0 fL    MCH 32.0 26.6 - 33.0 pg    MCHC 33.6 31.5 - 35.7 g/dL    RDW 11.9 (L) 12.3 - 15.4 %    RDW-SD 41.5 37.0 - 54.0 fl    MPV 9.9 6.0 - 12.0 fL    Platelets 157 140 - 450 10*3/mm3    Neutrophil % 51.2 42.7 - 76.0 %    Lymphocyte % 31.2 19.6 - 45.3 %    Monocyte % 15.7 (H) 5.0 - 12.0 %    Eosinophil % 0.1 (L) 0.3 - 6.2 %    Basophil % 0.9 0.0 - 1.5 %    Immature Grans % 0.9 (H) 0.0 - 0.5 %     Neutrophils, Absolute 4.82 1.70 - 7.00 10*3/mm3    Lymphocytes, Absolute 2.93 0.70 - 3.10 10*3/mm3    Monocytes, Absolute 1.48 (H) 0.10 - 0.90 10*3/mm3    Eosinophils, Absolute 0.01 0.00 - 0.40 10*3/mm3    Basophils, Absolute 0.08 0.00 - 0.20 10*3/mm3    Immature Grans, Absolute 0.08 (H) 0.00 - 0.05 10*3/mm3    nRBC 0.0 0.0 - 0.2 /100 WBC   Lactic Acid, Plasma    Collection Time: 09/11/24 10:20 AM    Specimen: Blood   Result Value Ref Range    Lactate 2.0 0.5 - 2.0 mmol/L   Urinalysis With Microscopic If Indicated (No Culture) - Urine, Clean Catch    Collection Time: 09/11/24 11:02 AM    Specimen: Urine, Clean Catch   Result Value Ref Range    Color, UA Yellow Yellow, Straw    Appearance, UA Cloudy (A) Clear    pH, UA 6.0 5.0 - 8.0    Specific Gravity, UA 1.015 1.005 - 1.030    Glucose, UA Negative Negative    Ketones, UA Negative Negative    Bilirubin, UA Negative Negative    Blood, UA Trace (A) Negative    Protein, UA Trace (A) Negative    Leuk Esterase, UA Moderate (2+) (A) Negative    Nitrite, UA Negative Negative    Urobilinogen, UA 0.2 E.U./dL 0.2 - 1.0 E.U./dL   Urinalysis, Microscopic Only - Urine, Clean Catch    Collection Time: 09/11/24 11:02 AM    Specimen: Urine, Clean Catch   Result Value Ref Range    RBC, UA 0-2 None Seen, 0-2 /HPF    WBC, UA 21-50 (A) None Seen, 0-2 /HPF    Bacteria, UA 1+ (A) None Seen /HPF    Squamous Epithelial Cells, UA 0-2 None Seen, 0-2 /HPF    Hyaline Casts, UA None Seen None Seen /LPF    Methodology Manual Light Microscopy        If labs were ordered, I independently reviewed the results and considered them in treating the patient.        RADIOLOGY  XR Chest 1 View    Result Date: 9/11/2024  PROCEDURE: XR CHEST 1 VW-  HISTORY: Weak/Dizzy/AMS triage protocol, COVID-positive  COMPARISON: May 17, 2024..  FINDINGS: The heart is enlarged, stable from prior. Status post median sternotomy.. The lungs are clear. The mediastinum is unremarkable. There is no pneumothorax.  There are no  acute osseous abnormalities. Apical lordotic positioning noted. Patient is rotated to the right. There is patchy bibasilar airspace disease which is new from prior exam and suggest pneumonia, recommend follow-up to document resolution.      Bibasilar airspace disease suggesting pneumonia, recommend follow-up..     This report was signed and finalized on 9/11/2024 10:58 AM by Tonja Schultz MD.           PROCEDURES    Procedures    Interpretations    O2 Sat: The patients oxygen saturation was 95% on Room Air.  This was independently interpreted by me as Normal    EKG: I reviewed and independently interpreted the EKG as sinus rhythm at 69 bpm, normal axis, normal intervals, no ST elevation, no T wave inversions    Cardiac Monitoring: I reviewed and independently interpreted the Rhythm Strip as Normal Sinus rhythm rate of 71    Radiology: I ordered and independently reviewed the above noted radiographic studies.  I viewed images of Chest Xray which showed  bilateral lower lobe disease  per my independent interpretation. See radiologist's dictation for official interpretation.         MEDICATIONS GIVEN IN ER    Medications   sodium chloride 0.9 % flush 10 mL (has no administration in time range)   sodium chloride 0.9 % bolus 500 mL (0 mL Intravenous Stopped 9/11/24 1311)   tobramycin (NEBCIN) 570 mg in sodium chloride 0.9 % 100 mL IVPB (0 mg Intravenous Stopped 9/11/24 1324)         MEDICAL DECISION MAKING, PROGRESS, and CONSULTS    All labs, if obtained, have been independently reviewed by me.  All radiology studies, if obtained, have been reviewed by me and the radiologist dictating the report.  All EKG's, if obtained, have been independently viewed and interpreted by me      Discussion below represents my analysis of pertinent findings related to patient's condition, differential diagnosis, treatment plan and final disposition.      Differential diagnosis:    88-year-old male presented ED complaining of weakness, he  was diagnosed COVID yesterday, which certainly could be contributing to his weakness, he also has history of significant Pseudomonas MDR UTIs, which he could have it is having some dysuria although he did just finished a dose of outpatient IV tobramycin for this.  Will obtain broad laboratory workup, including urinalysis, lactic acid, chest x-ray, provide IV fluid bolus    Additional Sources:  Discussed/ obtained information from independent historians:  EMS  External (non-ED) record review:  Primary care note 7/10/20[facility] from [facility] as well as infusion center note 7/17/2024 regarding multidrug-resistant urinary tract infection   Chronic or social conditions impacting care:  Hard of hearing, debility, chronic UTI      Orders placed during this visit:  Orders Placed This Encounter   Procedures    Urine Culture - Urine,    XR Chest 1 View    [place] Draw    Comprehensive Metabolic Panel    Single High Sensitivity Troponin T    Magnesium    Urinalysis With Microscopic If Indicated (No Culture) - Urine, Clean Catch    CBC Auto Differential    Lactic Acid, Plasma    Urinalysis, Microscopic Only - Urine, Clean Catch    NPO Diet NPO Type: Strict NPO    Undress & Gown    Continuous Pulse Oximetry    Vital Signs    Oxygen Therapy- Nasal Cannula; Titrate 1-6 LPM Per SpO2; 90 - 95%    POC Glucose Once    ECG 12 Lead ED Triage Standing Order; Weak / Dizzy / AMS    Insert Peripheral IV    Fall Precautions    CBC & Differential    Green Top (Gel)    Lavender Top    Gold Top - SST    Light Blue Top         Additional orders considered but not ordered:  None    ED Course:    Consultants:   Infusion center    ED Course as of 09/11/24 1330   Wed Sep 11, 2024   1053 Comprehensive Metabolic Panel(!)  Creatinine mildly elevated from baseline, provide IV fluid bolus [CS]   1053 CBC & Differential(!)  White blood cell count is benign [CS]   1057 Single High Sensitivity Troponin T  Troponin is negative no need for repeat the  patient has no chest pain [CS]   1100 XR Chest 1 View  X-ray read as bibasilar airspace disease likely secondary to his known diagnosis of Covid [CS]   1110 Lactic Acid, Plasma  Lactate negative, less likely significant septic process [CS]   1203 Urinalysis, Microscopic Only - Urine, Clean Catch(!)  Urinalysis shows acute infection, reviewing the patient's prior urine cultures including recently at [facility] that shows that he has significantly resistant Pseudomonas requiring IV tobramycin infusions daily.  Will start IV tobramycin here after speaking with the pharmacist, additionally I will try and consult the infusion center for outpatient tobramycin infusions [CS]   1316 Spoke to the infusion center they are able to accept the patient for outpatient tobramycin infusions I advised the patient's son of this and he was agreeable [CS]      ED Course User Index  [CS] Chay Tripp MD           After my consideration of clinical presentation and any laboratory/radiology studies obtained, I discussed the findings with the patient/patient representative who is in agreement with the treatment plan and the final disposition. Risks and benefits of discharge were discussed.     AS OF 13:30 EDT VITALS:    BP - 111/61  HR - 65  TEMP - 97.8 °F (36.6 °C) (Oral)  O2 SATS - 95%    I reviewed the patients prescription monitoring report if available prior to discharge    DIAGNOSIS  Final diagnoses:   Acute cystitis without hematuria         DISPOSITION  ED Disposition       ED Disposition   Discharge    Condition   Stable    Comment   --                   Please note that portions of this document were completed with voice recognition software.        Chay Tripp MD  09/11/24 2991

## 2024-09-11 NOTE — ED NOTES
[facility] contacted at this time for non-emergent EMS transfer to patients residents. Awaiting EMS at this time.

## 2024-09-11 NOTE — ED NOTES
"[facility] called at this time to ask for transfer to patients residents. EMS stated \"it will be awhile before they arrive because they are busy.\" ILEANA Blunt notified.   "

## 2024-09-12 ENCOUNTER — HOSPITAL ENCOUNTER (OUTPATIENT)
Facility: HOSPITAL | Age: 89
Discharge: HOME | End: 2024-09-12
Payer: MEDICARE

## 2024-09-12 VITALS
SYSTOLIC BLOOD PRESSURE: 112 MMHG | BODY MASS INDEX: 27.55 KG/M2 | OXYGEN SATURATION: 96 % | HEART RATE: 78 BPM | DIASTOLIC BLOOD PRESSURE: 65 MMHG | WEIGHT: 186 LBS | HEIGHT: 69 IN | RESPIRATION RATE: 20 BRPM | TEMPERATURE: 97.8 F

## 2024-09-12 DIAGNOSIS — N30.00 ACUTE CYSTITIS WITHOUT HEMATURIA: Primary | ICD-10-CM

## 2024-09-12 PROCEDURE — 25010000002 TOBRAMYCIN PER 80 MG: Performed by: EMERGENCY MEDICINE

## 2024-09-12 PROCEDURE — 96365 THER/PROPH/DIAG IV INF INIT: CPT

## 2024-09-12 RX ORDER — SODIUM CHLORIDE 9 MG/ML
20 INJECTION, SOLUTION INTRAVENOUS ONCE
Status: CANCELLED | OUTPATIENT
Start: 2024-09-13

## 2024-09-12 RX ORDER — SODIUM CHLORIDE 9 MG/ML
20 INJECTION, SOLUTION INTRAVENOUS ONCE
Status: DISCONTINUED | OUTPATIENT
Start: 2024-09-12 | End: 2024-09-13 | Stop reason: HOSPADM

## 2024-09-12 RX ADMIN — TOBRAMYCIN 570 MG: 40 INJECTION INTRAMUSCULAR; INTRAVENOUS at 14:28

## 2024-09-13 ENCOUNTER — HOSPITAL ENCOUNTER (OUTPATIENT)
Facility: HOSPITAL | Age: 89
Discharge: HOME | End: 2024-09-13
Payer: MEDICARE

## 2024-09-13 VITALS
RESPIRATION RATE: 18 BRPM | OXYGEN SATURATION: 96 % | HEART RATE: 72 BPM | DIASTOLIC BLOOD PRESSURE: 66 MMHG | SYSTOLIC BLOOD PRESSURE: 115 MMHG | TEMPERATURE: 97.6 F

## 2024-09-13 DIAGNOSIS — N30.00 ACUTE CYSTITIS WITHOUT HEMATURIA: Primary | ICD-10-CM

## 2024-09-13 LAB
ANION GAP SERPL CALCULATED.3IONS-SCNC: 11.2 MMOL/L (ref 5–15)
BACTERIA SPEC AEROBE CULT: ABNORMAL
BUN SERPL-MCNC: 23 MG/DL (ref 8–23)
BUN/CREAT SERPL: 16 (ref 7–25)
CALCIUM SPEC-SCNC: 8.3 MG/DL (ref 8.6–10.5)
CHLORIDE SERPL-SCNC: 101 MMOL/L (ref 98–107)
CO2 SERPL-SCNC: 20.8 MMOL/L (ref 22–29)
CREAT SERPL-MCNC: 1.44 MG/DL (ref 0.76–1.27)
EGFRCR SERPLBLD CKD-EPI 2021: 47.3 ML/MIN/1.73
GLUCOSE SERPL-MCNC: 128 MG/DL (ref 65–99)
POTASSIUM SERPL-SCNC: 3.6 MMOL/L (ref 3.5–5.2)
SODIUM SERPL-SCNC: 133 MMOL/L (ref 136–145)

## 2024-09-13 PROCEDURE — 80048 BASIC METABOLIC PNL TOTAL CA: CPT

## 2024-09-13 PROCEDURE — 25010000002 TOBRAMYCIN PER 80 MG: Performed by: EMERGENCY MEDICINE

## 2024-09-13 PROCEDURE — 96374 THER/PROPH/DIAG INJ IV PUSH: CPT

## 2024-09-13 PROCEDURE — 80200 ASSAY OF TOBRAMYCIN: CPT

## 2024-09-13 RX ORDER — SODIUM CHLORIDE 9 MG/ML
20 INJECTION, SOLUTION INTRAVENOUS ONCE
Status: CANCELLED | OUTPATIENT
Start: 2024-09-14

## 2024-09-13 RX ORDER — SODIUM CHLORIDE 9 MG/ML
20 INJECTION, SOLUTION INTRAVENOUS ONCE
Status: DISCONTINUED | OUTPATIENT
Start: 2024-09-13 | End: 2024-09-14 | Stop reason: HOSPADM

## 2024-09-13 RX ADMIN — TOBRAMYCIN 570 MG: 40 INJECTION INTRAMUSCULAR; INTRAVENOUS at 14:58

## 2024-09-14 ENCOUNTER — HOSPITAL ENCOUNTER (OUTPATIENT)
Facility: HOSPITAL | Age: 89
Discharge: HOME | End: 2024-09-14
Payer: MEDICARE

## 2024-09-14 DIAGNOSIS — N30.00 ACUTE CYSTITIS WITHOUT HEMATURIA: Primary | ICD-10-CM

## 2024-09-14 LAB — TOBRAMYCIN TROUGH SERPL-MCNC: 1.8 MCG/ML (ref 0.5–2)

## 2024-09-14 PROCEDURE — 96365 THER/PROPH/DIAG IV INF INIT: CPT

## 2024-09-14 PROCEDURE — 25010000002 TOBRAMYCIN PER 80 MG: Performed by: EMERGENCY MEDICINE

## 2024-09-14 RX ORDER — SODIUM CHLORIDE 9 MG/ML
20 INJECTION, SOLUTION INTRAVENOUS ONCE
Status: DISCONTINUED | OUTPATIENT
Start: 2024-09-14 | End: 2024-09-15 | Stop reason: HOSPADM

## 2024-09-14 RX ORDER — SODIUM CHLORIDE 9 MG/ML
20 INJECTION, SOLUTION INTRAVENOUS ONCE
Status: CANCELLED | OUTPATIENT
Start: 2024-09-15

## 2024-09-14 RX ADMIN — TOBRAMYCIN 570 MG: 40 INJECTION INTRAMUSCULAR; INTRAVENOUS at 09:33

## 2024-09-15 ENCOUNTER — HOSPITAL ENCOUNTER (OUTPATIENT)
Facility: HOSPITAL | Age: 89
Discharge: HOME | End: 2024-09-15
Payer: MEDICARE

## 2024-09-15 DIAGNOSIS — N30.00 ACUTE CYSTITIS WITHOUT HEMATURIA: Primary | ICD-10-CM

## 2024-09-15 PROCEDURE — 25010000002 TOBRAMYCIN PER 80 MG: Performed by: EMERGENCY MEDICINE

## 2024-09-15 PROCEDURE — 96365 THER/PROPH/DIAG IV INF INIT: CPT

## 2024-09-15 RX ORDER — SODIUM CHLORIDE 9 MG/ML
20 INJECTION, SOLUTION INTRAVENOUS ONCE
Status: DISCONTINUED | OUTPATIENT
Start: 2024-09-15 | End: 2024-09-16 | Stop reason: HOSPADM

## 2024-09-15 RX ORDER — SODIUM CHLORIDE 9 MG/ML
20 INJECTION, SOLUTION INTRAVENOUS ONCE
Status: CANCELLED | OUTPATIENT
Start: 2024-09-16

## 2024-09-15 RX ADMIN — TOBRAMYCIN 570 MG: 40 INJECTION INTRAMUSCULAR; INTRAVENOUS at 09:45

## 2024-09-16 ENCOUNTER — HOSPITAL ENCOUNTER (OUTPATIENT)
Facility: HOSPITAL | Age: 89
Discharge: HOME | End: 2024-09-16
Admitting: EMERGENCY MEDICINE
Payer: MEDICARE

## 2024-09-16 VITALS
SYSTOLIC BLOOD PRESSURE: 122 MMHG | TEMPERATURE: 98.8 F | OXYGEN SATURATION: 95 % | RESPIRATION RATE: 18 BRPM | DIASTOLIC BLOOD PRESSURE: 71 MMHG | HEART RATE: 60 BPM

## 2024-09-16 DIAGNOSIS — N30.00 ACUTE CYSTITIS WITHOUT HEMATURIA: Primary | ICD-10-CM

## 2024-09-16 PROCEDURE — G0463 HOSPITAL OUTPT CLINIC VISIT: HCPCS

## 2024-09-16 PROCEDURE — 36415 COLL VENOUS BLD VENIPUNCTURE: CPT

## 2024-09-16 RX ORDER — SODIUM CHLORIDE 9 MG/ML
20 INJECTION, SOLUTION INTRAVENOUS ONCE
Status: CANCELLED | OUTPATIENT
Start: 2024-09-17

## 2024-09-16 RX ORDER — SODIUM CHLORIDE 9 MG/ML
20 INJECTION, SOLUTION INTRAVENOUS ONCE
Status: DISCONTINUED | OUTPATIENT
Start: 2024-09-16 | End: 2024-09-17 | Stop reason: HOSPADM

## 2024-09-17 ENCOUNTER — HOSPITAL ENCOUNTER (OUTPATIENT)
Facility: HOSPITAL | Age: 89
Discharge: HOME | End: 2024-09-17
Admitting: EMERGENCY MEDICINE
Payer: MEDICARE

## 2024-09-17 VITALS
SYSTOLIC BLOOD PRESSURE: 119 MMHG | DIASTOLIC BLOOD PRESSURE: 61 MMHG | OXYGEN SATURATION: 95 % | HEART RATE: 70 BPM | RESPIRATION RATE: 18 BRPM

## 2024-09-17 DIAGNOSIS — N30.00 ACUTE CYSTITIS WITHOUT HEMATURIA: Primary | ICD-10-CM

## 2024-09-17 LAB
ANION GAP SERPL CALCULATED.3IONS-SCNC: 14.5 MMOL/L (ref 5–15)
BUN SERPL-MCNC: 15 MG/DL (ref 8–23)
BUN/CREAT SERPL: 12.8 (ref 7–25)
CALCIUM SPEC-SCNC: 8.1 MG/DL (ref 8.6–10.5)
CHLORIDE SERPL-SCNC: 101 MMOL/L (ref 98–107)
CO2 SERPL-SCNC: 20.5 MMOL/L (ref 22–29)
CREAT SERPL-MCNC: 1.17 MG/DL (ref 0.76–1.27)
EGFRCR SERPLBLD CKD-EPI 2021: 60.7 ML/MIN/1.73
GLUCOSE SERPL-MCNC: 156 MG/DL (ref 65–99)
POTASSIUM SERPL-SCNC: 3.4 MMOL/L (ref 3.5–5.2)
SODIUM SERPL-SCNC: 136 MMOL/L (ref 136–145)
TOBRAMYCIN TROUGH SERPL-MCNC: 0.4 MCG/ML (ref 0.5–2)

## 2024-09-17 PROCEDURE — G0463 HOSPITAL OUTPT CLINIC VISIT: HCPCS

## 2024-09-17 PROCEDURE — 80048 BASIC METABOLIC PNL TOTAL CA: CPT | Performed by: EMERGENCY MEDICINE

## 2024-09-17 PROCEDURE — 80200 ASSAY OF TOBRAMYCIN: CPT | Performed by: EMERGENCY MEDICINE

## 2024-09-17 PROCEDURE — 36415 COLL VENOUS BLD VENIPUNCTURE: CPT

## 2024-09-17 RX ORDER — SODIUM CHLORIDE 9 MG/ML
20 INJECTION, SOLUTION INTRAVENOUS ONCE
Status: DISCONTINUED | OUTPATIENT
Start: 2024-09-17 | End: 2024-09-18 | Stop reason: HOSPADM

## 2024-09-17 RX ORDER — SODIUM CHLORIDE 9 MG/ML
20 INJECTION, SOLUTION INTRAVENOUS ONCE
OUTPATIENT
Start: 2024-09-17

## (undated) DEVICE — HP CONCL INTREPID COAX I/A CRV .3MM

## (undated) DEVICE — CLEARCUT® HP2 SLIT KNIFE INTREPID MICRO-COAXIAL SYSTEM 2.4 DB: Brand: CLEARCUT®; INTREPID

## (undated) DEVICE — DRSNG WND GZ CURAD OIL EMULSION 3X3IN STRL

## (undated) DEVICE — SOL IRRIG H2O 1000ML STRL

## (undated) DEVICE — Device

## (undated) DEVICE — CAST PADDING 3"X4 YD KIT: Brand: CARDINAL HEALTH

## (undated) DEVICE — DRSNG ADAPTIC 3X16

## (undated) DEVICE — BIT DRL QC DIA W/DEPTHMARK 1.8X110MM

## (undated) DEVICE — 15 DEG. MICROKNIFE - 3MM: Brand: SHARPOINT

## (undated) DEVICE — GLV SURG NEOPRN SENSICARE SZ8

## (undated) DEVICE — SUT VIC 3/0 SH 27IN J416H

## (undated) DEVICE — PK EXTRM UPPR 20

## (undated) DEVICE — SPNG GZ WOVN 4X4IN 12PLY 10/BX STRL

## (undated) DEVICE — POST OP EYE CARE KIT: Brand: MEDLINE

## (undated) DEVICE — GLV SURG SENSICARE W/ALOE PF LF 8 STRL

## (undated) DEVICE — SYR LUERLOK 5CC

## (undated) DEVICE — SINGLE PORT MANIFOLD: Brand: NEPTUNE 2

## (undated) DEVICE — CANN IRR/INJ AIR ANT CHAMBER 6MM BEND 27G

## (undated) DEVICE — Device: Brand: MALYUGIN RING SYSTEM 6.25MM

## (undated) DEVICE — GW THRD 1.25X150MM FOR 3.5 4MM CANN SCRW

## (undated) DEVICE — BNDG ELAS MATRX V/CLS 4IN 5YD LF

## (undated) DEVICE — FLEXIBLE YANKAUER,MEDIUM TIP, NO VACUUM CONTROL: Brand: ARGYLE

## (undated) DEVICE — PAD,ABDOMINAL,5"X9",STERILE,LF,1/PK: Brand: MEDLINE INDUSTRIES, INC.

## (undated) DEVICE — SUT ETHLN 4/0 PS2 PLSTC 1667G

## (undated) DEVICE — CLAVICLE STRAP: Brand: DEROYAL

## (undated) DEVICE — BANDAGE,GAUZE,CONFORMING,4"X75",STRL,LF: Brand: MEDLINE

## (undated) DEVICE — BNDG ELAS CO-FLEX SLF ADHR 4IN5YD LF STRL

## (undated) DEVICE — DISPOSABLE TOURNIQUET CUFF SINGLE BLADDER, SINGLE PORT AND QUICK CONNECT CONNECTOR: Brand: COLOR CUFF